# Patient Record
Sex: FEMALE | Race: BLACK OR AFRICAN AMERICAN | Employment: FULL TIME | URBAN - METROPOLITAN AREA
[De-identification: names, ages, dates, MRNs, and addresses within clinical notes are randomized per-mention and may not be internally consistent; named-entity substitution may affect disease eponyms.]

---

## 2017-05-02 DIAGNOSIS — B37.31 VAGINAL YEAST INFECTION: ICD-10-CM

## 2017-05-02 DIAGNOSIS — N76.0 BV (BACTERIAL VAGINOSIS): Primary | ICD-10-CM

## 2017-05-02 DIAGNOSIS — B96.89 BV (BACTERIAL VAGINOSIS): Primary | ICD-10-CM

## 2017-05-02 RX ORDER — FLUCONAZOLE 150 MG/1
150 TABLET ORAL ONCE
Qty: 1 TABLET | Refills: 1 | Status: SHIPPED | OUTPATIENT
Start: 2017-05-02 | End: 2017-05-02

## 2017-05-02 RX ORDER — METRONIDAZOLE 500 MG/1
500 TABLET ORAL 2 TIMES DAILY
Qty: 14 TABLET | Refills: 0 | Status: SHIPPED | OUTPATIENT
Start: 2017-05-02 | End: 2017-05-09

## 2017-05-13 ENCOUNTER — HOSPITAL ENCOUNTER (OUTPATIENT)
Dept: ULTRASOUND IMAGING | Age: 52
Discharge: HOME | End: 2017-05-13
Attending: OBSTETRICS & GYNECOLOGY | Admitting: OBSTETRICS & GYNECOLOGY

## 2017-05-13 DIAGNOSIS — R10.2 PELVIC PAIN IN FEMALE: ICD-10-CM

## 2017-06-16 ENCOUNTER — OFFICE VISIT (OUTPATIENT)
Dept: PRIMARY CARE CLINIC | Age: 52
End: 2017-06-16

## 2017-06-16 VITALS
DIASTOLIC BLOOD PRESSURE: 82 MMHG | HEIGHT: 62 IN | BODY MASS INDEX: 36.07 KG/M2 | TEMPERATURE: 97.8 F | WEIGHT: 196 LBS | SYSTOLIC BLOOD PRESSURE: 120 MMHG

## 2017-06-16 DIAGNOSIS — L50.8 URTICARIA, CHRONIC: Primary | ICD-10-CM

## 2017-06-16 PROCEDURE — 99202 OFFICE O/P NEW SF 15 MIN: CPT | Performed by: NURSE PRACTITIONER

## 2017-06-16 RX ORDER — CETIRIZINE HYDROCHLORIDE 1 MG/ML
10 SOLUTION ORAL DAILY
Qty: 1 BOTTLE | Refills: 3 | Status: SHIPPED | OUTPATIENT
Start: 2017-06-16 | End: 2017-08-21

## 2017-06-16 RX ORDER — HYDROXYZINE HYDROCHLORIDE 10 MG/1
10 TABLET, FILM COATED ORAL 3 TIMES DAILY PRN
Qty: 30 TABLET | Refills: 0 | Status: SHIPPED | OUTPATIENT
Start: 2017-06-16 | End: 2017-06-26

## 2017-06-16 ASSESSMENT — ENCOUNTER SYMPTOMS
VOMITING: 0
NAIL CHANGES: 0
SORE THROAT: 0
RHINORRHEA: 0
SHORTNESS OF BREATH: 0
COUGH: 0
DIARRHEA: 0
EYE PAIN: 0

## 2017-08-21 ENCOUNTER — OFFICE VISIT (OUTPATIENT)
Dept: FAMILY MEDICINE CLINIC | Age: 52
End: 2017-08-21

## 2017-08-21 VITALS
WEIGHT: 196 LBS | HEIGHT: 63 IN | DIASTOLIC BLOOD PRESSURE: 80 MMHG | SYSTOLIC BLOOD PRESSURE: 120 MMHG | BODY MASS INDEX: 34.73 KG/M2

## 2017-08-21 DIAGNOSIS — L50.9 HIVES: ICD-10-CM

## 2017-08-21 DIAGNOSIS — Z86.2 HISTORY OF ANEMIA: ICD-10-CM

## 2017-08-21 DIAGNOSIS — Z12.11 SCREEN FOR COLON CANCER: ICD-10-CM

## 2017-08-21 DIAGNOSIS — Z00.00 PREVENTATIVE HEALTH CARE: Primary | ICD-10-CM

## 2017-08-21 PROCEDURE — 99386 PREV VISIT NEW AGE 40-64: CPT | Performed by: PHYSICIAN ASSISTANT

## 2017-08-21 RX ORDER — HYDROXYZINE HYDROCHLORIDE 10 MG/1
20 TABLET, FILM COATED ORAL NIGHTLY PRN
COMMUNITY
End: 2018-09-28

## 2017-08-21 RX ORDER — LORATADINE 10 MG/1
10 TABLET ORAL DAILY
COMMUNITY
End: 2017-12-04 | Stop reason: ALTCHOICE

## 2017-08-21 RX ORDER — RANITIDINE 150 MG/1
150 TABLET ORAL 2 TIMES DAILY
COMMUNITY
End: 2017-12-04 | Stop reason: ALTCHOICE

## 2017-08-21 ASSESSMENT — ENCOUNTER SYMPTOMS
COUGH: 0
DIARRHEA: 0
CONSTIPATION: 0
TROUBLE SWALLOWING: 0
BACK PAIN: 0
CHEST TIGHTNESS: 0
VOICE CHANGE: 0
SHORTNESS OF BREATH: 0
ABDOMINAL PAIN: 0
SORE THROAT: 0
EYE PAIN: 0

## 2017-11-15 ENCOUNTER — HOSPITAL ENCOUNTER (OUTPATIENT)
Dept: MAMMOGRAPHY | Age: 52
Discharge: HOME | End: 2017-11-15
Attending: OBSTETRICS & GYNECOLOGY | Admitting: OBSTETRICS & GYNECOLOGY

## 2017-11-15 DIAGNOSIS — Z12.31 VISIT FOR SCREENING MAMMOGRAM: ICD-10-CM

## 2017-12-04 ENCOUNTER — OFFICE VISIT (OUTPATIENT)
Dept: FAMILY MEDICINE CLINIC | Age: 52
End: 2017-12-04

## 2017-12-04 VITALS
SYSTOLIC BLOOD PRESSURE: 130 MMHG | BODY MASS INDEX: 35.1 KG/M2 | WEIGHT: 195 LBS | OXYGEN SATURATION: 99 % | DIASTOLIC BLOOD PRESSURE: 90 MMHG | HEART RATE: 66 BPM

## 2017-12-04 DIAGNOSIS — M79.642 BILATERAL HAND PAIN: Primary | ICD-10-CM

## 2017-12-04 DIAGNOSIS — M79.641 BILATERAL HAND PAIN: Primary | ICD-10-CM

## 2017-12-04 PROCEDURE — 99213 OFFICE O/P EST LOW 20 MIN: CPT | Performed by: PHYSICIAN ASSISTANT

## 2017-12-04 RX ORDER — MELOXICAM 7.5 MG/1
7.5 TABLET ORAL DAILY
Qty: 30 TABLET | Refills: 3 | Status: SHIPPED | OUTPATIENT
Start: 2017-12-04 | End: 2018-01-26 | Stop reason: SDUPTHER

## 2017-12-04 ASSESSMENT — PATIENT HEALTH QUESTIONNAIRE - PHQ9
SUM OF ALL RESPONSES TO PHQ QUESTIONS 1-9: 0
2. FEELING DOWN, DEPRESSED OR HOPELESS: 0
SUM OF ALL RESPONSES TO PHQ9 QUESTIONS 1 & 2: 0
1. LITTLE INTEREST OR PLEASURE IN DOING THINGS: 0

## 2017-12-04 ASSESSMENT — ENCOUNTER SYMPTOMS: COLOR CHANGE: 0

## 2017-12-06 ENCOUNTER — TELEPHONE (OUTPATIENT)
Dept: INTERNAL MEDICINE CLINIC | Age: 52
End: 2017-12-06

## 2018-01-26 ENCOUNTER — OFFICE VISIT (OUTPATIENT)
Dept: RHEUMATOLOGY | Age: 53
End: 2018-01-26

## 2018-01-26 VITALS
WEIGHT: 198.8 LBS | DIASTOLIC BLOOD PRESSURE: 90 MMHG | HEIGHT: 63 IN | SYSTOLIC BLOOD PRESSURE: 140 MMHG | BODY MASS INDEX: 35.22 KG/M2

## 2018-01-26 DIAGNOSIS — M15.9 PRIMARY OSTEOARTHRITIS INVOLVING MULTIPLE JOINTS: Primary | ICD-10-CM

## 2018-01-26 DIAGNOSIS — R79.82 ELEVATED C-REACTIVE PROTEIN (CRP): Primary | ICD-10-CM

## 2018-01-26 DIAGNOSIS — M15.9 PRIMARY OSTEOARTHRITIS INVOLVING MULTIPLE JOINTS: ICD-10-CM

## 2018-01-26 DIAGNOSIS — M70.62 TROCHANTERIC BURSITIS OF LEFT HIP: ICD-10-CM

## 2018-01-26 LAB
A/G RATIO: 1.4 (ref 1.1–2.2)
ALBUMIN SERPL-MCNC: 4.3 G/DL (ref 3.4–5)
ALP BLD-CCNC: 125 U/L (ref 40–129)
ALT SERPL-CCNC: 16 U/L (ref 10–40)
ANION GAP SERPL CALCULATED.3IONS-SCNC: 14 MMOL/L (ref 3–16)
AST SERPL-CCNC: 20 U/L (ref 15–37)
BASOPHILS ABSOLUTE: 0 K/UL (ref 0–0.2)
BASOPHILS RELATIVE PERCENT: 0.5 %
BILIRUB SERPL-MCNC: <0.2 MG/DL (ref 0–1)
BUN BLDV-MCNC: 15 MG/DL (ref 7–20)
C-REACTIVE PROTEIN: 22.8 MG/L (ref 0–5.1)
CALCIUM SERPL-MCNC: 9.5 MG/DL (ref 8.3–10.6)
CHLORIDE BLD-SCNC: 103 MMOL/L (ref 99–110)
CO2: 25 MMOL/L (ref 21–32)
CREAT SERPL-MCNC: 0.6 MG/DL (ref 0.6–1.1)
EOSINOPHILS ABSOLUTE: 0.1 K/UL (ref 0–0.6)
EOSINOPHILS RELATIVE PERCENT: 1.9 %
GFR AFRICAN AMERICAN: >60
GFR NON-AFRICAN AMERICAN: >60
GLOBULIN: 3.1 G/DL
GLUCOSE BLD-MCNC: 99 MG/DL (ref 70–99)
HCT VFR BLD CALC: 35.1 % (ref 36–48)
HEMOGLOBIN: 11.5 G/DL (ref 12–16)
LYMPHOCYTES ABSOLUTE: 2.8 K/UL (ref 1–5.1)
LYMPHOCYTES RELATIVE PERCENT: 53.3 %
MCH RBC QN AUTO: 29.1 PG (ref 26–34)
MCHC RBC AUTO-ENTMCNC: 32.8 G/DL (ref 31–36)
MCV RBC AUTO: 88.5 FL (ref 80–100)
MONOCYTES ABSOLUTE: 0.4 K/UL (ref 0–1.3)
MONOCYTES RELATIVE PERCENT: 7.5 %
NEUTROPHILS ABSOLUTE: 1.9 K/UL (ref 1.7–7.7)
NEUTROPHILS RELATIVE PERCENT: 36.8 %
PDW BLD-RTO: 15.3 % (ref 12.4–15.4)
PLATELET # BLD: 244 K/UL (ref 135–450)
PMV BLD AUTO: 8.7 FL (ref 5–10.5)
POTASSIUM SERPL-SCNC: 4.1 MMOL/L (ref 3.5–5.1)
RBC # BLD: 3.96 M/UL (ref 4–5.2)
SEDIMENTATION RATE, ERYTHROCYTE: 38 MM/HR (ref 0–30)
SODIUM BLD-SCNC: 142 MMOL/L (ref 136–145)
TOTAL PROTEIN: 7.4 G/DL (ref 6.4–8.2)
WBC # BLD: 5.2 K/UL (ref 4–11)

## 2018-01-26 PROCEDURE — 99244 OFF/OP CNSLTJ NEW/EST MOD 40: CPT | Performed by: INTERNAL MEDICINE

## 2018-01-26 PROCEDURE — 20610 DRAIN/INJ JOINT/BURSA W/O US: CPT | Performed by: INTERNAL MEDICINE

## 2018-01-26 RX ORDER — LIDOCAINE HYDROCHLORIDE 10 MG/ML
3 INJECTION, SOLUTION INFILTRATION; PERINEURAL ONCE
Status: COMPLETED | OUTPATIENT
Start: 2018-01-26 | End: 2018-01-26

## 2018-01-26 RX ORDER — MELOXICAM 15 MG/1
15 TABLET ORAL DAILY
Qty: 30 TABLET | Refills: 5 | Status: SHIPPED | OUTPATIENT
Start: 2018-01-26 | End: 2018-06-25 | Stop reason: SDUPTHER

## 2018-01-26 RX ORDER — TRIAMCINOLONE ACETONIDE 40 MG/ML
40 INJECTION, SUSPENSION INTRA-ARTICULAR; INTRAMUSCULAR ONCE
Status: COMPLETED | OUTPATIENT
Start: 2018-01-26 | End: 2018-01-26

## 2018-01-26 RX ORDER — RANITIDINE 150 MG/1
150 TABLET ORAL DAILY
COMMUNITY
End: 2018-09-28

## 2018-01-26 RX ADMIN — TRIAMCINOLONE ACETONIDE 40 MG: 40 INJECTION, SUSPENSION INTRA-ARTICULAR; INTRAMUSCULAR at 13:14

## 2018-01-26 RX ADMIN — LIDOCAINE HYDROCHLORIDE 3 ML: 10 INJECTION, SOLUTION INFILTRATION; PERINEURAL at 13:12

## 2018-01-26 NOTE — PATIENT INSTRUCTIONS
Increase meloxicam to 15 mg daily   Tylenol 650 mg every 6 hours   Physical and occupational therapy   Paraffin wax bath   Weight loss

## 2018-01-26 NOTE — PROGRESS NOTES
malaise. Integumentary: No photosensitivity, malar rash, livedo reticularis, alopecia and Raynaud's symptoms, sclerodactyly, skin tightening  Eyes: negative for dry eyes, visual disturbance and persistent redness, discharge from eyes   ENT: - No tinnitus, loss of hearing, vertigo, or recurrent ear infections.  - No history of nasal/oral ulcers. - No history of sicca symptoms. Cardiovascular: No history of pericarditis, chest pain or murmur or palpitations  Respiratory: No shortness of breath, cough or history of interstitial lung disease. No history of pleurisy. No history of tuberculosis or atypical infections. Gastrointestinal: No history of dysphagia or esophageal dysmotility. No change in bowel habits or any inflammatory bowel disease. Genitourinary: No history renal disease, miscarriages. Hematologic/Lymphatic: No  bleeding, blood clots or swollen lymph nodes. Neurological: No history seizure or focal weakness. No history of neuropathies, paresthesias or hyperesthesias, facial droop, diplopia  Psychiatric: No history of bipolar disease, anxiety, depression  Endocrine: Denies any thyroid / parathyroid disease and osteoporosis  Allergic/Immunologic: No nasal congestion         I have reviewed patients Past medical History, Social History and Family History as mentioned in her chart and this remains unchanged from previous. Past Medical History:   Diagnosis Date    Anemia     Fibroids      Past Surgical History:   Procedure Laterality Date    HYSTERECTOMY  10/2013    abbie PROCTOR     Social History     Social History    Marital status: Single     Spouse name: N/A    Number of children: N/A    Years of education: N/A     Occupational History    Not on file.      Social History Main Topics    Smoking status: Never Smoker    Smokeless tobacco: Never Used    Alcohol use Yes      Comment: RARELY    Drug use: No    Sexual activity: Yes     Partners: Male     Other Topics Concern    Not on file

## 2018-03-16 DIAGNOSIS — R79.82 ELEVATED C-REACTIVE PROTEIN (CRP): ICD-10-CM

## 2018-03-17 LAB
HEPATITIS B CORE IGM ANTIBODY: NORMAL
HEPATITIS B SURFACE ANTIGEN INTERPRETATION: NORMAL
HEPATITIS C ANTIBODY INTERPRETATION: NORMAL
RHEUMATOID FACTOR: <10 IU/ML

## 2018-03-18 LAB
CCP IGG ANTIBODIES: 26 UNITS (ref 0–19)
ENA TO SSB (LA) ANTIBODY: 0 AU/ML (ref 0–40)
SSA 52 (RO) (ENA) AB, IGG: 4 AU/ML (ref 0–40)
SSA 60 (RO) (ENA) AB, IGG: 4 AU/ML (ref 0–40)

## 2018-03-19 ENCOUNTER — OFFICE VISIT (OUTPATIENT)
Dept: RHEUMATOLOGY | Age: 53
End: 2018-03-19

## 2018-03-19 VITALS
HEIGHT: 62 IN | BODY MASS INDEX: 35.81 KG/M2 | SYSTOLIC BLOOD PRESSURE: 120 MMHG | OXYGEN SATURATION: 98 % | HEART RATE: 81 BPM | WEIGHT: 194.6 LBS | DIASTOLIC BLOOD PRESSURE: 80 MMHG

## 2018-03-19 DIAGNOSIS — M15.9 PRIMARY OSTEOARTHRITIS INVOLVING MULTIPLE JOINTS: Primary | ICD-10-CM

## 2018-03-19 DIAGNOSIS — M06.09 RHEUMATOID ARTHRITIS OF MULTIPLE SITES WITH NEGATIVE RHEUMATOID FACTOR (HCC): ICD-10-CM

## 2018-03-19 LAB
ALBUMIN SERPL-MCNC: 3.3 G/DL (ref 3.1–4.9)
ALPHA-1-GLOBULIN: 0.3 G/DL (ref 0.2–0.4)
ALPHA-2-GLOBULIN: 0.6 G/DL (ref 0.4–1.1)
ANA BY IFA: NORMAL
BETA GLOBULIN: 1.6 G/DL (ref 0.9–1.6)
GAMMA GLOBULIN: 1.3 G/DL (ref 0.6–1.8)
SPE/IFE INTERPRETATION: NORMAL
TOTAL PROTEIN: 7.1 G/DL (ref 6.4–8.2)

## 2018-03-19 PROCEDURE — 99214 OFFICE O/P EST MOD 30 MIN: CPT | Performed by: INTERNAL MEDICINE

## 2018-03-19 RX ORDER — FOLIC ACID 1 MG/1
1 TABLET ORAL DAILY
Qty: 30 TABLET | Refills: 5 | Status: SHIPPED | OUTPATIENT
Start: 2018-03-19 | End: 2019-01-25

## 2018-03-19 NOTE — PROGRESS NOTES
FULL    MTP5  0  0  FULL   0  0  FULL    IP1  0  0  FULL   0  0  FULL    IP2  0  0  FULL   0  0  FULL    IP3  0  0  FULL   0  0  FULL    IP4  0  0  FULL   0  0  FULL    IP5  0  0  FULL   0 0 FULL     Ambulates without assistance, normal gait  Neck: Full ROM, no tenderness, supple   Back- no tenderness. Eyes:  PERRL, extra ocular movements intact, conjunctiva normal   HEENT:  Atraumatic, normocephalic, external ears normal, oropharynx moist, no pharyngeal exudates. Respiratory:  No respiratory distress  GI:  Soft, nondistended, normal bowel sounds, nontender, no organomegaly, no mass, no rebound, no guarding   :  No costovertebral angle tenderness   Integument:  Well hydrated, no rash or telangiectasias  Lymphatic:  No lymphadenopathy noted   Neurologic:   Alert & oriented x 3, CN 2-12 normal, no focal deficits noted. Sensations Intact. Muscle strength 5/5 proximally and distally in upper and lower extremities.    Psychiatric:  Speech and behavior appropriate           LABS AND IMAGING  Outside data reviewed and in HPI    Lab Results   Component Value Date    WBC 5.2 01/26/2018    RBC 3.96 01/26/2018    HGB 11.5 01/26/2018    HCT 35.1 01/26/2018     01/26/2018    MCV 88.5 01/26/2018    MCH 29.1 01/26/2018    MCHC 32.8 01/26/2018    RDW 15.3 01/26/2018    SEGSPCT 34 09/02/2017    LYMPHOPCT 53.3 01/26/2018    MONOPCT 7.5 01/26/2018    BASOPCT 0.5 01/26/2018    MONOSABS 0.4 01/26/2018    LYMPHSABS 2.8 01/26/2018    EOSABS 0.1 01/26/2018    BASOSABS 0.0 01/26/2018       Chemistry        Component Value Date/Time     01/26/2018 1004    K 4.1 01/26/2018 1004     01/26/2018 1004    CO2 25 01/26/2018 1004    BUN 15 01/26/2018 1004    CREATININE 0.6 01/26/2018 1004        Component Value Date/Time    CALCIUM 9.5 01/26/2018 1004    ALKPHOS 125 01/26/2018 1004    AST 20 01/26/2018 1004    ALT 16 01/26/2018 1004    BILITOT <0.2 01/26/2018 1004          Lab Results   Component Value Date    SEDRATE 38 (H) with the patient. All questions were answered. ######################################################################    I thank you for giving me the opportunity to participate in [facility]. If you have any questions or concerns please feel free to contact me. I look forward to following  Kiara Jameson along with you. Electronically signed by: Alley Vizcarra MD, 3/19/2018 3:23 PM    Documentation was done using voice recognition dragon software. Every effort was made to ensure accuracy; however, inadvertent unintentional computerized transcription errors may be present.

## 2018-04-09 ENCOUNTER — TELEPHONE (OUTPATIENT)
Dept: RHEUMATOLOGY | Age: 53
End: 2018-04-09

## 2018-04-17 ENCOUNTER — OFFICE VISIT (OUTPATIENT)
Dept: GYNECOLOGY | Age: 53
End: 2018-04-17

## 2018-04-17 VITALS
SYSTOLIC BLOOD PRESSURE: 121 MMHG | BODY MASS INDEX: 36.4 KG/M2 | WEIGHT: 199 LBS | DIASTOLIC BLOOD PRESSURE: 67 MMHG | HEART RATE: 72 BPM

## 2018-04-17 DIAGNOSIS — N89.8 VAGINAL DISCHARGE: Primary | ICD-10-CM

## 2018-04-17 LAB
BACTERIA WET PREP: NORMAL
CLUE CELLS: NORMAL
EPITHELIAL CELLS WET PREP: NORMAL
RBC WET PREP: NORMAL
SOURCE WET PREP: NORMAL
TRICHOMONAS PREP: NORMAL
WBC WET PREP: NORMAL
YEAST WET PREP: NORMAL

## 2018-04-17 PROCEDURE — 99213 OFFICE O/P EST LOW 20 MIN: CPT | Performed by: NURSE PRACTITIONER

## 2018-04-19 ENCOUNTER — PATIENT MESSAGE (OUTPATIENT)
Dept: GYNECOLOGY | Age: 53
End: 2018-04-19

## 2018-04-19 LAB — GENITAL CULTURE, ROUTINE: NORMAL

## 2018-05-14 ENCOUNTER — TELEPHONE (OUTPATIENT)
Dept: GYNECOLOGY | Age: 53
End: 2018-05-14

## 2018-05-14 ENCOUNTER — OFFICE VISIT (OUTPATIENT)
Dept: RHEUMATOLOGY | Age: 53
End: 2018-05-14

## 2018-05-14 VITALS
SYSTOLIC BLOOD PRESSURE: 110 MMHG | DIASTOLIC BLOOD PRESSURE: 88 MMHG | OXYGEN SATURATION: 96 % | BODY MASS INDEX: 36.44 KG/M2 | WEIGHT: 198 LBS | HEIGHT: 62 IN | HEART RATE: 66 BPM

## 2018-05-14 DIAGNOSIS — M15.9 PRIMARY OSTEOARTHRITIS INVOLVING MULTIPLE JOINTS: ICD-10-CM

## 2018-05-14 DIAGNOSIS — M06.09 RHEUMATOID ARTHRITIS OF MULTIPLE SITES WITH NEGATIVE RHEUMATOID FACTOR (HCC): Primary | ICD-10-CM

## 2018-05-14 DIAGNOSIS — M06.09 RHEUMATOID ARTHRITIS OF MULTIPLE SITES WITH NEGATIVE RHEUMATOID FACTOR (HCC): ICD-10-CM

## 2018-05-14 DIAGNOSIS — R35.0 URINE FREQUENCY: Primary | ICD-10-CM

## 2018-05-14 LAB
A/G RATIO: 1.4 (ref 1.1–2.2)
ALBUMIN SERPL-MCNC: 4.1 G/DL (ref 3.4–5)
ALP BLD-CCNC: 115 U/L (ref 40–129)
ALT SERPL-CCNC: 11 U/L (ref 10–40)
ANION GAP SERPL CALCULATED.3IONS-SCNC: 14 MMOL/L (ref 3–16)
AST SERPL-CCNC: 15 U/L (ref 15–37)
BASOPHILS ABSOLUTE: 0 K/UL (ref 0–0.2)
BASOPHILS RELATIVE PERCENT: 0.4 %
BILIRUB SERPL-MCNC: <0.2 MG/DL (ref 0–1)
BUN BLDV-MCNC: 11 MG/DL (ref 7–20)
C-REACTIVE PROTEIN: 14.7 MG/L (ref 0–5.1)
CALCIUM SERPL-MCNC: 9.3 MG/DL (ref 8.3–10.6)
CHLORIDE BLD-SCNC: 103 MMOL/L (ref 99–110)
CO2: 26 MMOL/L (ref 21–32)
CREAT SERPL-MCNC: 0.6 MG/DL (ref 0.6–1.1)
EOSINOPHILS ABSOLUTE: 0.1 K/UL (ref 0–0.6)
EOSINOPHILS RELATIVE PERCENT: 1.6 %
GFR AFRICAN AMERICAN: >60
GFR NON-AFRICAN AMERICAN: >60
GLOBULIN: 2.9 G/DL
GLUCOSE BLD-MCNC: 89 MG/DL (ref 70–99)
HCT VFR BLD CALC: 34.2 % (ref 36–48)
HEMOGLOBIN: 11.3 G/DL (ref 12–16)
LYMPHOCYTES ABSOLUTE: 3.1 K/UL (ref 1–5.1)
LYMPHOCYTES RELATIVE PERCENT: 50 %
MCH RBC QN AUTO: 28.8 PG (ref 26–34)
MCHC RBC AUTO-ENTMCNC: 33.2 G/DL (ref 31–36)
MCV RBC AUTO: 86.9 FL (ref 80–100)
MONOCYTES ABSOLUTE: 0.3 K/UL (ref 0–1.3)
MONOCYTES RELATIVE PERCENT: 5.5 %
NEUTROPHILS ABSOLUTE: 2.6 K/UL (ref 1.7–7.7)
NEUTROPHILS RELATIVE PERCENT: 42.5 %
PDW BLD-RTO: 14.7 % (ref 12.4–15.4)
PLATELET # BLD: 256 K/UL (ref 135–450)
PMV BLD AUTO: 9 FL (ref 5–10.5)
POTASSIUM SERPL-SCNC: 3.8 MMOL/L (ref 3.5–5.1)
RBC # BLD: 3.93 M/UL (ref 4–5.2)
SEDIMENTATION RATE, ERYTHROCYTE: 41 MM/HR (ref 0–30)
SODIUM BLD-SCNC: 143 MMOL/L (ref 136–145)
TOTAL PROTEIN: 7 G/DL (ref 6.4–8.2)
WBC # BLD: 6.2 K/UL (ref 4–11)

## 2018-05-14 PROCEDURE — 99214 OFFICE O/P EST MOD 30 MIN: CPT | Performed by: INTERNAL MEDICINE

## 2018-05-14 RX ORDER — LEFLUNOMIDE 20 MG/1
20 TABLET ORAL DAILY
Qty: 30 TABLET | Refills: 0 | Status: SHIPPED | OUTPATIENT
Start: 2018-05-14 | End: 2018-06-25 | Stop reason: SDUPTHER

## 2018-05-15 RX ORDER — CIPROFLOXACIN 500 MG/1
500 TABLET, FILM COATED ORAL 2 TIMES DAILY
Qty: 14 TABLET | Refills: 0 | OUTPATIENT
Start: 2018-05-15 | End: 2018-05-22

## 2018-05-15 RX ORDER — PHENAZOPYRIDINE HYDROCHLORIDE 200 MG/1
200 TABLET, FILM COATED ORAL 3 TIMES DAILY PRN
Qty: 15 TABLET | Refills: 0 | OUTPATIENT
Start: 2018-05-15 | End: 2018-05-18

## 2018-05-17 LAB
ORGANISM: ABNORMAL
URINE CULTURE, ROUTINE: ABNORMAL
URINE CULTURE, ROUTINE: ABNORMAL

## 2018-06-25 ENCOUNTER — OFFICE VISIT (OUTPATIENT)
Dept: RHEUMATOLOGY | Age: 53
End: 2018-06-25

## 2018-06-25 VITALS
HEIGHT: 62 IN | SYSTOLIC BLOOD PRESSURE: 120 MMHG | DIASTOLIC BLOOD PRESSURE: 88 MMHG | BODY MASS INDEX: 35.37 KG/M2 | OXYGEN SATURATION: 96 % | WEIGHT: 192.2 LBS | HEART RATE: 73 BPM

## 2018-06-25 DIAGNOSIS — M15.9 PRIMARY OSTEOARTHRITIS INVOLVING MULTIPLE JOINTS: ICD-10-CM

## 2018-06-25 DIAGNOSIS — M06.09 RHEUMATOID ARTHRITIS OF MULTIPLE SITES WITH NEGATIVE RHEUMATOID FACTOR (HCC): ICD-10-CM

## 2018-06-25 DIAGNOSIS — M06.09 RHEUMATOID ARTHRITIS OF MULTIPLE SITES WITH NEGATIVE RHEUMATOID FACTOR (HCC): Primary | ICD-10-CM

## 2018-06-25 DIAGNOSIS — Z51.11 MAINTENANCE CHEMOTHERAPY: ICD-10-CM

## 2018-06-25 LAB
ALT SERPL-CCNC: 27 U/L (ref 10–40)
AST SERPL-CCNC: 26 U/L (ref 15–37)
BASOPHILS ABSOLUTE: 0 K/UL (ref 0–0.2)
BASOPHILS RELATIVE PERCENT: 0.3 %
CREAT SERPL-MCNC: 0.5 MG/DL (ref 0.6–1.1)
EOSINOPHILS ABSOLUTE: 0.1 K/UL (ref 0–0.6)
EOSINOPHILS RELATIVE PERCENT: 1.7 %
GFR AFRICAN AMERICAN: >60
GFR NON-AFRICAN AMERICAN: >60
HCT VFR BLD CALC: 36.3 % (ref 36–48)
HEMOGLOBIN: 12 G/DL (ref 12–16)
LYMPHOCYTES ABSOLUTE: 2.7 K/UL (ref 1–5.1)
LYMPHOCYTES RELATIVE PERCENT: 55.8 %
MCH RBC QN AUTO: 29.1 PG (ref 26–34)
MCHC RBC AUTO-ENTMCNC: 33.1 G/DL (ref 31–36)
MCV RBC AUTO: 87.8 FL (ref 80–100)
MONOCYTES ABSOLUTE: 0.4 K/UL (ref 0–1.3)
MONOCYTES RELATIVE PERCENT: 8.5 %
NEUTROPHILS ABSOLUTE: 1.6 K/UL (ref 1.7–7.7)
NEUTROPHILS RELATIVE PERCENT: 33.7 %
PDW BLD-RTO: 15.4 % (ref 12.4–15.4)
PLATELET # BLD: 244 K/UL (ref 135–450)
PMV BLD AUTO: 9.1 FL (ref 5–10.5)
RBC # BLD: 4.13 M/UL (ref 4–5.2)
WBC # BLD: 4.8 K/UL (ref 4–11)

## 2018-06-25 PROCEDURE — 99214 OFFICE O/P EST MOD 30 MIN: CPT | Performed by: INTERNAL MEDICINE

## 2018-06-25 PROCEDURE — 20610 DRAIN/INJ JOINT/BURSA W/O US: CPT | Performed by: INTERNAL MEDICINE

## 2018-06-25 RX ORDER — MELOXICAM 15 MG/1
15 TABLET ORAL DAILY
Qty: 30 TABLET | Refills: 5 | Status: SHIPPED | OUTPATIENT
Start: 2018-06-25 | End: 2019-03-29 | Stop reason: SDUPTHER

## 2018-06-25 RX ORDER — LEFLUNOMIDE 20 MG/1
20 TABLET ORAL DAILY
Qty: 30 TABLET | Refills: 2 | Status: SHIPPED | OUTPATIENT
Start: 2018-06-25 | End: 2018-09-07 | Stop reason: SDUPTHER

## 2018-06-25 RX ORDER — LIDOCAINE HYDROCHLORIDE 20 MG/ML
2 INJECTION, SOLUTION INFILTRATION; PERINEURAL ONCE
Status: COMPLETED | OUTPATIENT
Start: 2018-06-25 | End: 2018-06-25

## 2018-06-25 RX ORDER — TRIAMCINOLONE ACETONIDE 40 MG/ML
60 INJECTION, SUSPENSION INTRA-ARTICULAR; INTRAMUSCULAR ONCE
Status: COMPLETED | OUTPATIENT
Start: 2018-06-25 | End: 2018-06-25

## 2018-06-25 RX ADMIN — LIDOCAINE HYDROCHLORIDE 2 ML: 20 INJECTION, SOLUTION INFILTRATION; PERINEURAL at 13:10

## 2018-06-25 RX ADMIN — TRIAMCINOLONE ACETONIDE 60 MG: 40 INJECTION, SUSPENSION INTRA-ARTICULAR; INTRAMUSCULAR at 13:12

## 2018-06-28 LAB
QUANTIFERON (R) TB GOLD (INCUBATED): NEGATIVE
QUANTIFERON MITOGEN: >10 IU/ML
QUANTIFERON NIL: 0.04 IU/ML
QUANTIFERON TB AG MINUS NIL: 0.06 IU/ML (ref 0–0.34)

## 2018-09-07 DIAGNOSIS — M06.09 RHEUMATOID ARTHRITIS OF MULTIPLE SITES WITH NEGATIVE RHEUMATOID FACTOR (HCC): ICD-10-CM

## 2018-09-07 RX ORDER — LEFLUNOMIDE 20 MG/1
20 TABLET ORAL DAILY
Qty: 30 TABLET | Refills: 2 | Status: SHIPPED | OUTPATIENT
Start: 2018-09-07 | End: 2018-12-28

## 2018-09-28 ENCOUNTER — OFFICE VISIT (OUTPATIENT)
Dept: RHEUMATOLOGY | Age: 53
End: 2018-09-28
Payer: COMMERCIAL

## 2018-09-28 VITALS
BODY MASS INDEX: 34.85 KG/M2 | HEART RATE: 84 BPM | WEIGHT: 189.4 LBS | DIASTOLIC BLOOD PRESSURE: 84 MMHG | HEIGHT: 62 IN | SYSTOLIC BLOOD PRESSURE: 130 MMHG

## 2018-09-28 DIAGNOSIS — Z51.11 MAINTENANCE CHEMOTHERAPY: ICD-10-CM

## 2018-09-28 DIAGNOSIS — M15.9 PRIMARY OSTEOARTHRITIS INVOLVING MULTIPLE JOINTS: ICD-10-CM

## 2018-09-28 DIAGNOSIS — M06.09 RHEUMATOID ARTHRITIS OF MULTIPLE SITES WITH NEGATIVE RHEUMATOID FACTOR (HCC): Primary | ICD-10-CM

## 2018-09-28 DIAGNOSIS — M06.09 RHEUMATOID ARTHRITIS OF MULTIPLE SITES WITH NEGATIVE RHEUMATOID FACTOR (HCC): ICD-10-CM

## 2018-09-28 LAB
ALT SERPL-CCNC: 13 U/L (ref 10–40)
AST SERPL-CCNC: 17 U/L (ref 15–37)
BASOPHILS ABSOLUTE: 0.1 K/UL (ref 0–0.2)
BASOPHILS RELATIVE PERCENT: 1 %
C-REACTIVE PROTEIN: 19.5 MG/L (ref 0–5.1)
CREAT SERPL-MCNC: 0.6 MG/DL (ref 0.6–1.1)
EOSINOPHILS ABSOLUTE: 0.1 K/UL (ref 0–0.6)
EOSINOPHILS RELATIVE PERCENT: 2.6 %
GFR AFRICAN AMERICAN: >60
GFR NON-AFRICAN AMERICAN: >60
HCT VFR BLD CALC: 38 % (ref 36–48)
HEMOGLOBIN: 12.3 G/DL (ref 12–16)
LYMPHOCYTES ABSOLUTE: 2.7 K/UL (ref 1–5.1)
LYMPHOCYTES RELATIVE PERCENT: 52.3 %
MCH RBC QN AUTO: 28.7 PG (ref 26–34)
MCHC RBC AUTO-ENTMCNC: 32.2 G/DL (ref 31–36)
MCV RBC AUTO: 89 FL (ref 80–100)
MONOCYTES ABSOLUTE: 0.3 K/UL (ref 0–1.3)
MONOCYTES RELATIVE PERCENT: 6 %
NEUTROPHILS ABSOLUTE: 2 K/UL (ref 1.7–7.7)
NEUTROPHILS RELATIVE PERCENT: 38.1 %
PDW BLD-RTO: 15.1 % (ref 12.4–15.4)
PLATELET # BLD: 259 K/UL (ref 135–450)
PMV BLD AUTO: 9.4 FL (ref 5–10.5)
RBC # BLD: 4.27 M/UL (ref 4–5.2)
SEDIMENTATION RATE, ERYTHROCYTE: 34 MM/HR (ref 0–30)
WBC # BLD: 5.2 K/UL (ref 4–11)

## 2018-09-28 PROCEDURE — 99214 OFFICE O/P EST MOD 30 MIN: CPT | Performed by: INTERNAL MEDICINE

## 2018-09-28 NOTE — PROGRESS NOTES
2018  Patient Name: Kwasi Sebastian  : 1965  Medical Record: [id number]    MEDICATIONS  Current Outpatient Prescriptions   Medication Sig Dispense Refill    leflunomide (ARAVA) 20 MG tablet Take 1 tablet by mouth daily 30 tablet 2    meloxicam (MOBIC) 15 MG tablet Take 1 tablet by mouth daily 30 tablet 5    folic acid (FOLVITE) 1 MG tablet Take 1 tablet by mouth daily 30 tablet 5     No current facility-administered medications for this visit. ALLERGIES  No Known Allergies      Comments  No specialty comments available. Background history:  Kwasi Sebastian is a 47 y.o. female who is being seen for follow up evaluation of Joint pain. She is complaining of pain in hands, left hip and left knee. Pain in the hands started 2 years ago and is progressively getting worse. She describes her pain as constant, aching, 5-6 out of 10 without any significant relieving or aggravating factors. Pain is located in PIP, MCP, DIP joints. Pain in the left hip is located on the lateral side and gets worse on laying on the left side. Knees crack and pop. Knee pain gets worse going up and down the steps. She has swelling in the knuckles, stiffness throughout the day. She also has difficulty opening doorknobs and jar cans. She is on meloxicam 7.5 mg daily without improvement. She denies any history of psoriasis, inflammatory bowel disease, inflammatory back pain, uveitis, enthesitis, tenosynovitis or dactylitis. Interim history: She presents for follow-up of Osteoarthritis and rheumatoid arthritis. She has mild achiness in the joints with weather changes. She denies any swelling or stiffness. She had corticosteroid injection in the left knee 3 months ago which has helped significantly. She occasionally feels pain in the left knee at night. She is on meloxicam 15 mg daily and Arava 20 mg daily. X-rays were ordered but not obtained.   Blood work shows low titer positive CCP, elevated ESR and Results   Component Value Date    SEDRATE 41 (H) 05/14/2018     Lab Results   Component Value Date    CRP 14.7 (H) 05/14/2018     No results found for: JADEN, SHANIQUE, SSA, SSB, C3, C4  Lab Results   Component Value Date    RF <10.0 03/16/2018    CCPABIGG 26 03/16/2018     No results found for: JADEN, ANATITER, ANAINT, PATH  No results found for: [facility], DSDNAIGGIFA  Lab Results   Component Value Date    SSALAAB 0 03/16/2018     No results found for: SMAB, RNPAB  No results found for: CENTABIGG  No results found for: C3, C4, ACE  No results found for: JO1, VITD25, [id number]    ASSESSMENT AND PLAN      Assessment/Plan:      ASSESSMENT:    1. Rheumatoid arthritis of multiple sites with negative rheumatoid factor ([facility] [address]    2. Maintenance chemotherapy    3. Primary osteoarthritis involving multiple joints        PLAN:   1. Rheumatoid arthritis of multiple sites with negative rheumatoid factor (HCC)  Low titer positive CCP, elevated ESR and CRP and negative RF  -No active synovitis on joint exam  -Continue leflunomide 20 mg daily  - C-Reactive Protein; Future  - Sedimentation Rate; Future  -Pass medications methotrexate [nausea and gastric upset]    2. Maintenance chemotherapy  Labs every 12 weeks  - ALT  - AST  - CBC Auto Differential  - Creatinine, Serum    3. Primary osteoarthritis involving multiple joints  Stable. Continue meloxicam 15 mg daily       The patient indicates understanding of these issues and agrees with the plan. Return in about 3 months (around 12/28/2018). The risks and benefits of my recommendations, as well as other treatment options, benefits and side effects were discussed with the patient. All questions were answered. ######################################################################    I thank you for giving me the opportunity to participate in [facility]. If you have any questions or concerns please feel free to contact me.  I look forward to following  Rachel along with you. Electronically signed by: Gordon Piña MD, 9/28/2018 10:28 AM    Documentation was done using voice recognition dragon software. Every effort was made to ensure accuracy; however, inadvertent unintentional computerized transcription errors may be present.

## 2018-12-28 ENCOUNTER — OFFICE VISIT (OUTPATIENT)
Dept: RHEUMATOLOGY | Age: 53
End: 2018-12-28
Payer: COMMERCIAL

## 2018-12-28 VITALS
TEMPERATURE: 98.1 F | HEIGHT: 62 IN | BODY MASS INDEX: 35.33 KG/M2 | SYSTOLIC BLOOD PRESSURE: 135 MMHG | WEIGHT: 192 LBS | HEART RATE: 76 BPM | DIASTOLIC BLOOD PRESSURE: 89 MMHG

## 2018-12-28 DIAGNOSIS — M15.9 PRIMARY OSTEOARTHRITIS INVOLVING MULTIPLE JOINTS: ICD-10-CM

## 2018-12-28 DIAGNOSIS — Z51.11 MAINTENANCE CHEMOTHERAPY: ICD-10-CM

## 2018-12-28 DIAGNOSIS — M06.09 RHEUMATOID ARTHRITIS OF MULTIPLE SITES WITH NEGATIVE RHEUMATOID FACTOR (HCC): Primary | ICD-10-CM

## 2018-12-28 PROCEDURE — 99214 OFFICE O/P EST MOD 30 MIN: CPT | Performed by: INTERNAL MEDICINE

## 2018-12-28 RX ORDER — SULFASALAZINE 500 MG/1
TABLET ORAL
Qty: 120 TABLET | Refills: 1 | Status: SHIPPED | OUTPATIENT
Start: 2018-12-28 | End: 2019-01-25

## 2019-01-15 ENCOUNTER — TELEPHONE (OUTPATIENT)
Dept: GYNECOLOGY | Age: 54
End: 2019-01-15

## 2019-01-15 DIAGNOSIS — N89.8 VAGINAL DISCHARGE: Primary | ICD-10-CM

## 2019-01-15 DIAGNOSIS — R30.0 DYSURIA: Primary | ICD-10-CM

## 2019-01-15 RX ORDER — FLUCONAZOLE 150 MG/1
150 TABLET ORAL ONCE
Qty: 1 TABLET | Refills: 1 | Status: SHIPPED | OUTPATIENT
Start: 2019-01-15 | End: 2019-01-15

## 2019-01-15 RX ORDER — METRONIDAZOLE 500 MG/1
500 TABLET ORAL 2 TIMES DAILY
Qty: 14 TABLET | Refills: 0 | Status: SHIPPED | OUTPATIENT
Start: 2019-01-15 | End: 2019-01-22

## 2019-01-25 ENCOUNTER — OFFICE VISIT (OUTPATIENT)
Dept: RHEUMATOLOGY | Age: 54
End: 2019-01-25
Payer: COMMERCIAL

## 2019-01-25 VITALS
WEIGHT: 198 LBS | DIASTOLIC BLOOD PRESSURE: 84 MMHG | HEART RATE: 69 BPM | HEIGHT: 62 IN | SYSTOLIC BLOOD PRESSURE: 141 MMHG | BODY MASS INDEX: 36.44 KG/M2

## 2019-01-25 DIAGNOSIS — M06.09 RHEUMATOID ARTHRITIS OF MULTIPLE SITES WITH NEGATIVE RHEUMATOID FACTOR (HCC): Primary | ICD-10-CM

## 2019-01-25 DIAGNOSIS — M15.9 PRIMARY OSTEOARTHRITIS INVOLVING MULTIPLE JOINTS: ICD-10-CM

## 2019-01-25 DIAGNOSIS — Z51.11 MAINTENANCE CHEMOTHERAPY: ICD-10-CM

## 2019-01-25 LAB
ALT SERPL-CCNC: 17 U/L (ref 10–40)
AST SERPL-CCNC: 22 U/L (ref 15–37)
BASOPHILS ABSOLUTE: 0 K/UL (ref 0–0.2)
BASOPHILS RELATIVE PERCENT: 0.9 %
CREAT SERPL-MCNC: <0.5 MG/DL (ref 0.6–1.1)
EOSINOPHILS ABSOLUTE: 0.2 K/UL (ref 0–0.6)
EOSINOPHILS RELATIVE PERCENT: 4.2 %
GFR AFRICAN AMERICAN: >60
GFR NON-AFRICAN AMERICAN: >60
HCT VFR BLD CALC: 34.7 % (ref 36–48)
HEMOGLOBIN: 11.4 G/DL (ref 12–16)
LYMPHOCYTES ABSOLUTE: 2.6 K/UL (ref 1–5.1)
LYMPHOCYTES RELATIVE PERCENT: 54 %
MCH RBC QN AUTO: 28.8 PG (ref 26–34)
MCHC RBC AUTO-ENTMCNC: 32.9 G/DL (ref 31–36)
MCV RBC AUTO: 87.6 FL (ref 80–100)
MONOCYTES ABSOLUTE: 0.3 K/UL (ref 0–1.3)
MONOCYTES RELATIVE PERCENT: 6 %
NEUTROPHILS ABSOLUTE: 1.7 K/UL (ref 1.7–7.7)
NEUTROPHILS RELATIVE PERCENT: 34.9 %
PDW BLD-RTO: 14.9 % (ref 12.4–15.4)
PLATELET # BLD: 277 K/UL (ref 135–450)
PMV BLD AUTO: 9.1 FL (ref 5–10.5)
RBC # BLD: 3.97 M/UL (ref 4–5.2)
WBC # BLD: 4.7 K/UL (ref 4–11)

## 2019-01-25 PROCEDURE — 99214 OFFICE O/P EST MOD 30 MIN: CPT | Performed by: INTERNAL MEDICINE

## 2019-01-25 RX ORDER — FOLIC ACID 1 MG/1
1 TABLET ORAL DAILY
Qty: 30 TABLET | Refills: 5 | Status: SHIPPED | OUTPATIENT
Start: 2019-01-25 | End: 2019-05-31

## 2019-03-28 ENCOUNTER — HOSPITAL ENCOUNTER (OUTPATIENT)
Dept: GENERAL RADIOLOGY | Age: 54
Discharge: HOME | End: 2019-03-28
Payer: COMMERCIAL

## 2019-03-28 ENCOUNTER — HOSPITAL ENCOUNTER (OUTPATIENT)
Age: 54
Discharge: HOME | End: 2019-03-28
Payer: COMMERCIAL

## 2019-03-28 DIAGNOSIS — M15.9 PRIMARY OSTEOARTHRITIS INVOLVING MULTIPLE JOINTS: ICD-10-CM

## 2019-03-28 DIAGNOSIS — M06.09 RHEUMATOID ARTHRITIS OF MULTIPLE SITES WITH NEGATIVE RHEUMATOID FACTOR (HCC): ICD-10-CM

## 2019-03-28 PROCEDURE — 73130 X-RAY EXAM OF HAND: CPT

## 2019-03-28 PROCEDURE — 71046 X-RAY EXAM CHEST 2 VIEWS: CPT

## 2019-03-28 PROCEDURE — 73560 X-RAY EXAM OF KNEE 1 OR 2: CPT

## 2019-03-29 ENCOUNTER — TELEPHONE (OUTPATIENT)
Dept: INTERNAL MEDICINE CLINIC | Age: 54
End: 2019-03-29

## 2019-03-29 DIAGNOSIS — M15.9 PRIMARY OSTEOARTHRITIS INVOLVING MULTIPLE JOINTS: ICD-10-CM

## 2019-03-29 DIAGNOSIS — M06.09 RHEUMATOID ARTHRITIS OF MULTIPLE SITES WITH NEGATIVE RHEUMATOID FACTOR (HCC): ICD-10-CM

## 2019-03-29 RX ORDER — MELOXICAM 15 MG/1
15 TABLET ORAL DAILY
Qty: 30 TABLET | Refills: 5 | Status: SHIPPED | OUTPATIENT
Start: 2019-03-29 | End: 2019-04-01 | Stop reason: SDUPTHER

## 2019-03-29 RX ORDER — MELOXICAM 15 MG/1
15 TABLET ORAL DAILY
Qty: 7 TABLET | Refills: 0 | Status: SHIPPED | OUTPATIENT
Start: 2019-03-29 | End: 2019-03-29 | Stop reason: SDUPTHER

## 2019-03-29 NOTE — TELEPHONE ENCOUNTER
Pt calling needs refills of Meloxicam and Methotrexate---pt is out of medication---please send to [facility]. Thanks.

## 2019-04-01 ENCOUNTER — OFFICE VISIT (OUTPATIENT)
Dept: RHEUMATOLOGY | Age: 54
End: 2019-04-01
Payer: COMMERCIAL

## 2019-04-01 VITALS
HEIGHT: 62 IN | HEART RATE: 81 BPM | SYSTOLIC BLOOD PRESSURE: 127 MMHG | DIASTOLIC BLOOD PRESSURE: 85 MMHG | WEIGHT: 195.3 LBS | BODY MASS INDEX: 35.94 KG/M2

## 2019-04-01 DIAGNOSIS — L50.9 HIVES: ICD-10-CM

## 2019-04-01 DIAGNOSIS — M06.09 RHEUMATOID ARTHRITIS OF MULTIPLE SITES WITH NEGATIVE RHEUMATOID FACTOR (HCC): Primary | ICD-10-CM

## 2019-04-01 DIAGNOSIS — M15.9 PRIMARY OSTEOARTHRITIS INVOLVING MULTIPLE JOINTS: ICD-10-CM

## 2019-04-01 DIAGNOSIS — Z51.11 MAINTENANCE CHEMOTHERAPY: ICD-10-CM

## 2019-04-01 DIAGNOSIS — M06.09 RHEUMATOID ARTHRITIS OF MULTIPLE SITES WITH NEGATIVE RHEUMATOID FACTOR (HCC): ICD-10-CM

## 2019-04-01 LAB
ALT SERPL-CCNC: 13 U/L (ref 10–40)
AST SERPL-CCNC: 17 U/L (ref 15–37)
BASOPHILS ABSOLUTE: 0 K/UL (ref 0–0.2)
BASOPHILS RELATIVE PERCENT: 0.2 %
CREAT SERPL-MCNC: 0.6 MG/DL (ref 0.6–1.1)
EOSINOPHILS ABSOLUTE: 0 K/UL (ref 0–0.6)
EOSINOPHILS RELATIVE PERCENT: 0.4 %
GFR AFRICAN AMERICAN: >60
GFR NON-AFRICAN AMERICAN: >60
HCT VFR BLD CALC: 39.4 % (ref 36–48)
HEMOGLOBIN: 12.7 G/DL (ref 12–16)
LYMPHOCYTES ABSOLUTE: 2.7 K/UL (ref 1–5.1)
LYMPHOCYTES RELATIVE PERCENT: 44.3 %
MCH RBC QN AUTO: 28.7 PG (ref 26–34)
MCHC RBC AUTO-ENTMCNC: 32.1 G/DL (ref 31–36)
MCV RBC AUTO: 89.3 FL (ref 80–100)
MONOCYTES ABSOLUTE: 0.2 K/UL (ref 0–1.3)
MONOCYTES RELATIVE PERCENT: 3.7 %
NEUTROPHILS ABSOLUTE: 3.1 K/UL (ref 1.7–7.7)
NEUTROPHILS RELATIVE PERCENT: 51.4 %
PDW BLD-RTO: 16.2 % (ref 12.4–15.4)
PLATELET # BLD: 308 K/UL (ref 135–450)
PMV BLD AUTO: 8.9 FL (ref 5–10.5)
RBC # BLD: 4.41 M/UL (ref 4–5.2)
WBC # BLD: 6 K/UL (ref 4–11)

## 2019-04-01 PROCEDURE — 20610 DRAIN/INJ JOINT/BURSA W/O US: CPT | Performed by: INTERNAL MEDICINE

## 2019-04-01 PROCEDURE — 99214 OFFICE O/P EST MOD 30 MIN: CPT | Performed by: INTERNAL MEDICINE

## 2019-04-01 RX ORDER — MELOXICAM 15 MG/1
15 TABLET ORAL DAILY
Qty: 30 TABLET | Refills: 5 | Status: SHIPPED | OUTPATIENT
Start: 2019-04-01 | End: 2019-05-31 | Stop reason: SDUPTHER

## 2019-04-01 RX ORDER — METHYLPREDNISOLONE 4 MG/1
TABLET ORAL
Qty: 1 KIT | Refills: 0 | Status: SHIPPED | OUTPATIENT
Start: 2019-04-01 | End: 2019-04-07

## 2019-04-01 RX ORDER — LIDOCAINE HYDROCHLORIDE 10 MG/ML
2 INJECTION, SOLUTION INFILTRATION; PERINEURAL ONCE
Status: COMPLETED | OUTPATIENT
Start: 2019-04-01 | End: 2019-04-01

## 2019-04-01 RX ORDER — TRIAMCINOLONE ACETONIDE 40 MG/ML
80 INJECTION, SUSPENSION INTRA-ARTICULAR; INTRAMUSCULAR ONCE
Status: COMPLETED | OUTPATIENT
Start: 2019-04-01 | End: 2019-04-01

## 2019-04-01 RX ADMIN — TRIAMCINOLONE ACETONIDE 80 MG: 40 INJECTION, SUSPENSION INTRA-ARTICULAR; INTRAMUSCULAR at 11:27

## 2019-04-01 RX ADMIN — LIDOCAINE HYDROCHLORIDE 2 ML: 10 INJECTION, SOLUTION INFILTRATION; PERINEURAL at 11:26

## 2019-04-01 NOTE — PROGRESS NOTES
history: She presents for follow-up of Osteoarthritis and rheumatoid arthritis. She was placed on methotrexate 6 tablets once a week 8 weeks ago, she hasn't noticed any improvement in joint pain, swelling and stiffness. Symptoms are worse in her hands and knees. Left knee is worse than the right knee. She did not tolerate sulfasalazine [developed nausea, abdominal pain and diarrhea]. She did not tolerate Arava either [hair loss]. She has a morning stiffness lasting for one hour. She is on meloxicam 15 mg daily. X-rays of the hands consistent with erosive osteoarthritis. Knee x-rays mild degenerative changes. She has also developed hives for past few days, she denies any change in the medications or food. She gets on and off hives lasting for few days at a time. She is taking Benadryl as needed. Blood work shows low titer positive CCP, elevated ESR and CRP. HPI  ROS    REVIEW OF SYSTEMS:   Constitutional: No unanticipated weight loss or fevers. No fatigue and malaise. Integumentary: No photosensitivity, malar rash, livedo reticularis, alopecia and Raynaud's symptoms, sclerodactyly, skin tightening  Eyes: negative for dry eyes, visual disturbance and persistent redness, discharge from eyes   ENT: - No tinnitus, loss of hearing, vertigo, or recurrent ear infections.  - No history of nasal/oral ulcers. - No history of sicca symptoms. Cardiovascular: No history of pericarditis, chest pain or murmur or palpitations  Respiratory: No shortness of breath, cough or history of interstitial lung disease. No history of pleurisy. No history of tuberculosis or atypical infections. Gastrointestinal: No history of dysphagia or esophageal dysmotility. No change in bowel habits or any inflammatory bowel disease. Genitourinary: No history renal disease, miscarriages. Hematologic/Lymphatic: No  bleeding, blood clots or swollen lymph nodes. Neurological: No history seizure or focal weakness.  No history of neuropathies, paresthesias or hyperesthesias, facial droop, diplopia  Psychiatric: No history of bipolar disease, anxiety, depression  Endocrine: Denies any thyroid / parathyroid disease and osteoporosis  Allergic/Immunologic: No nasal congestion         I have reviewed patients Past medical History, Social History and Family History as mentioned in her chart and this remains unchanged from previous.     Past Medical History:   Diagnosis Date    Anemia     Fibroids     Rheumatoid arthritis ([facility] [address]      Past Surgical History:   Procedure Laterality Date    HYSTERECTOMY  10/2013    [facility], lso    TOOTH EXTRACTION  04/2018     Social History     Socioeconomic History    Marital status: Single     Spouse name: Not on file    Number of children: Not on file    Years of education: Not on file    Highest education level: Not on file   Occupational History    Not on file   Social Needs    Financial resource strain: Not on file    Food insecurity:     Worry: Not on file     Inability: Not on file    Transportation needs:     Medical: Not on file     Non-medical: Not on file   Tobacco Use    Smoking status: Never Smoker    Smokeless tobacco: Never Used   Substance and Sexual Activity    Alcohol use: Yes     Comment: RARELY    Drug use: No    Sexual activity: Yes     Partners: Male   Lifestyle    Physical activity:     Days per week: Not on file     Minutes per session: Not on file    Stress: Not on file   Relationships    Social connections:     Talks on phone: Not on file     Gets together: Not on file     Attends Yarsanism service: Not on file     Active member of club or organization: Not on file     Attends meetings of clubs or organizations: Not on file     Relationship status: Not on file    Intimate partner violence:     Fear of current or ex partner: Not on file     Emotionally abused: Not on file     Physically abused: Not on file     Forced sexual activity: Not on file   Other Topics Concern    Not on file   Social History Narrative    Not on file     Family History   Problem Relation Age of Onset    Diabetes Mother     Hypertension Mother     Heart Failure Mother     Heart Attack Mother     Diabetes Father     Cancer Sister         colon    Rheum Arthritis Neg Hx     Osteoarthritis Neg Hx     Asthma Neg Hx     Breast Cancer Neg Hx     High Cholesterol Neg Hx     Migraines Neg Hx     Ovarian Cancer Neg Hx     Rashes/Skin Problems Neg Hx     Seizures Neg Hx     Stroke Neg Hx     Thyroid Disease Neg Hx          PHYSICAL EXAM   Vitals:    04/01/19 0904   BP: 127/85   Site: Right Upper Arm   Pulse: 81   Weight: 195 lb 4.8 oz (88.6 kg)   Height: 5' 2\" (1.575 m)     Physical Exam  Constitutional:  Well developed, well nourished, no acute distress, non-toxic appearance   Musculoskeletal:    RIGHT  Swell  Tender  ROM  LEFT  Swell  Tender  ROM    DIP2  0  0  Heberden   0  0  Heberden    DIP3  0  0  Heberden   0  0  Heberden    DIP4  0  0  Heberden   0  0  Heberden    DIP5  0  0  Heberden   0  0  Heberden    PIP1  + + Bony change   + + Bony change    PIP2  + + Bony change   + + Bony change    PIP3  + + Bony change   + + Bony change    PIP4  + + Bony change   ++ ++ Bony change    PIP5  + + Bony change   0 0 Bony change    MCP1  0  0  FULL   0  0  FULL    MCP2  0  0  FULL   0  0  FULL    MCP3  0  0  FULL   0  0  FULL    MCP4  0  0  FULL   0  0  FULL    MCP5  0  0  FULL   0  0  FULL    Wrist  + + FULL   0  0  FULL    Elbow  0  0  FULL   0  0  FULL    Shouldr  0  0  FULL   0  0  FULL    Hip  0  0  FULL   0  + FULL    Knee  0 0 Crepitus   0 + Crepitus    Ankle  0  0  FULL   0  0  FULL    MTP1  0  0  FULL   0  0  FULL    MTP2  0  0  FULL   0  0  FULL    MTP3  0  0  FULL   0  0  FULL    MTP4  0  0  FULL   0  0  FULL    MTP5  0  0  FULL   0  0  FULL    IP1  0  0  FULL   0  0  FULL    IP2  0  0  FULL   0  0  FULL    IP3  0  0  FULL   0  0  FULL    IP4  0  0  FULL   0  0  FULL    IP5  0  0  FULL   0 0 FULL Ambulates without assistance, normal gait  Neck: Full ROM, no tenderness, supple   Back- no tenderness. Eyes:  PERRL, extra ocular movements intact, conjunctiva normal   HEENT:  Atraumatic, normocephalic, external ears normal, oropharynx moist, no pharyngeal exudates. Respiratory:  No respiratory distress  GI:  Soft, nondistended, normal bowel sounds, nontender, no organomegaly, no mass, no rebound, no guarding   :  No costovertebral angle tenderness   Integument:  Well hydrated, telangiectasias, hives+  Lymphatic:  No lymphadenopathy noted   Neurologic:   Alert & oriented x 3, CN 2-12 normal, no focal deficits noted. Sensations Intact. Muscle strength 5/5 proximally and distally in upper and lower extremities.    Psychiatric:  Speech and behavior appropriate           LABS AND IMAGING  Outside data reviewed and in HPI    Lab Results   Component Value Date    WBC 4.7 01/25/2019    RBC 3.97 01/25/2019    HGB 11.4 01/25/2019    HCT 34.7 01/25/2019     01/25/2019    MCV 87.6 01/25/2019    MCH 28.8 01/25/2019    MCHC 32.9 01/25/2019    RDW 14.9 01/25/2019    SEGSPCT 34 09/02/2017    LYMPHOPCT 54.0 01/25/2019    MONOPCT 6.0 01/25/2019    BASOPCT 0.9 01/25/2019    MONOSABS 0.3 01/25/2019    LYMPHSABS 2.6 01/25/2019    EOSABS 0.2 01/25/2019    BASOSABS 0.0 01/25/2019       Chemistry        Component Value Date/Time     05/14/2018 1403    K 3.8 05/14/2018 1403     05/14/2018 1403    CO2 26 05/14/2018 1403    BUN 11 05/14/2018 1403    CREATININE <0.5 (L) 01/25/2019 1104        Component Value Date/Time    CALCIUM 9.3 05/14/2018 1403    ALKPHOS 115 05/14/2018 1403    AST 22 01/25/2019 1104    ALT 17 01/25/2019 1104    BILITOT <0.2 05/14/2018 1403          Lab Results   Component Value Date    SEDRATE 34 (H) 09/28/2018     Lab Results   Component Value Date    CRP 19.5 (H) 09/28/2018     No results found for: JADEN, SHANIQUE, SSA, SSB, C3, C4  Lab Results   Component Value Date    RF <10.0 03/16/2018 CCPABIGG 26 03/16/2018     No results found for: JADEN, ANATITER, ANAINT, PATH  No results found for: [facility], DSDNAIGGIFA  Lab Results   Component Value Date    SSALAAB 0 03/16/2018     No results found for: SMAB, RNPAB  No results found for: CENTABIGG  No results found for: C3, C4, ACE  No results found for: JO1, VITD25, [id number]    ASSESSMENT AND PLAN      Assessment/Plan:      ASSESSMENT:    1. Rheumatoid arthritis of multiple sites with negative rheumatoid factor ([facility] [address]    2. Maintenance chemotherapy    3. Primary osteoarthritis involving multiple joints    4. Hives        PLAN:   1. Rheumatoid arthritis of multiple sites with negative rheumatoid factor (HCC)  Low titer positive CCP, elevated ESR and CRP and negative RF, hand x-rays with erosive osteoarthritis  - active synovitis on joint exam  -Discontinued leflunomide due to hair loss and sulfasalazine due to nausea and diarrhea  -No significant improvement with methotrexate  -We will start Enbrel 50 mg once a week  -Continue methotrexate 6 tablets once a week  -Check QuantiFERON TB Gold  - Etanercept (ENBREL MINI) 50 MG/ML SOCT; Inject 50 mg into the skin once a week  Dispense: 4 Cartridge; Refill: 5  - Quantiferon, Incubated; Future    2. Maintenance chemotherapy  - Creatinine, Serum  - CBC Auto Differential  - AST  - ALT  TNF INHIBITORS can cause increased risk of TB reactivation, oppurtunistic infections, lupus like illness, rash, hypersensitivity reaction, infusion reactions, demyelinating disease and possible risk of malignancies and lung diseases and were explained to the patient    3. Primary osteoarthritis involving multiple joints  Continue meloxicam 15 mg daily  -We will do left knee corticosteroid injection  - KS ARTHROCENTESIS ASPIR&/INJ MAJOR JT/BURSA W/O US  - triamcinolone acetonide (KENALOG-40) injection 80 mg  - lidocaine 1 % injection 2 mL    4.  Hives  Benadryl as needed and start Medrol Dosepak  - methylPREDNISolone (MEDROL DOSEPACK) 4 MG tablet; Take by mouth. Dispense: 1 kit; Refill: 0       PROCEDURE NOTE    JOINT ASPIRATION/INJECTION  Left knee corticosteroid injection:  The patient was informed about the risk and benefits of the procedure, including the risk of infection, hemorrhage and skin hypopigmentation from the corticosteroids. After informed consent was obtained, using sterile technique, the left anterior medial area was prepped and chlorhexidine was used as local anesthetic. The joint was entered and Kenalog 80 mg and 2 ml plain Lidocaine was then injected and the needle withdrawn. The procedure was well tolerated. No immediate complication noticed. The patient is asked to continue to rest the joint for a few more days before resuming regular activities. Advised patient to watch for fever, increased swelling or persistent pain in the joint. Call or return to clinic prn if such symptoms occur or there is failure to improve as anticipated. The patient indicates understanding of these issues and agrees with the plan. Return in about 6 weeks (around 5/13/2019). The risks and benefits of my recommendations, as well as other treatment options, benefits and side effects were discussed with the patient. All questions were answered. ######################################################################    I thank you for giving me the opportunity to participate in [facility] care. If you have any questions or concerns please feel free to contact me. I look forward to following  Sirisha Haskins along with you. Electronically signed by: Jennifer Diehl MD, 4/1/2019 9:49 AM    Documentation was done using voice recognition dragon software. Every effort was made to ensure accuracy; however, inadvertent unintentional computerized transcription errors may be present.

## 2019-04-05 LAB
QUANTI TB GOLD PLUS: NEGATIVE
QUANTI TB1 MINUS NIL: 0 IU/ML (ref 0–0.34)
QUANTI TB2 MINUS NIL: 0 IU/ML (ref 0–0.34)
QUANTIFERON MITOGEN: 3.38 IU/ML
QUANTIFERON NIL: 0.03 IU/ML

## 2019-04-12 ENCOUNTER — TELEPHONE (OUTPATIENT)
Dept: RHEUMATOLOGY | Age: 54
End: 2019-04-12

## 2019-05-31 ENCOUNTER — OFFICE VISIT (OUTPATIENT)
Dept: RHEUMATOLOGY | Age: 54
End: 2019-05-31
Payer: COMMERCIAL

## 2019-05-31 VITALS
HEIGHT: 62 IN | BODY MASS INDEX: 35.7 KG/M2 | HEART RATE: 64 BPM | WEIGHT: 194 LBS | SYSTOLIC BLOOD PRESSURE: 149 MMHG | DIASTOLIC BLOOD PRESSURE: 89 MMHG

## 2019-05-31 DIAGNOSIS — Z51.11 MAINTENANCE CHEMOTHERAPY: ICD-10-CM

## 2019-05-31 DIAGNOSIS — M15.9 PRIMARY OSTEOARTHRITIS INVOLVING MULTIPLE JOINTS: ICD-10-CM

## 2019-05-31 DIAGNOSIS — M06.09 RHEUMATOID ARTHRITIS OF MULTIPLE SITES WITH NEGATIVE RHEUMATOID FACTOR (HCC): Primary | ICD-10-CM

## 2019-05-31 LAB
ALT SERPL-CCNC: 17 U/L (ref 10–40)
AST SERPL-CCNC: 19 U/L (ref 15–37)
BASOPHILS ABSOLUTE: 0 K/UL (ref 0–0.2)
BASOPHILS RELATIVE PERCENT: 0.4 %
CREAT SERPL-MCNC: 0.6 MG/DL (ref 0.6–1.1)
EOSINOPHILS ABSOLUTE: 0.2 K/UL (ref 0–0.6)
EOSINOPHILS RELATIVE PERCENT: 2.9 %
GFR AFRICAN AMERICAN: >60
GFR NON-AFRICAN AMERICAN: >60
HCT VFR BLD CALC: 34.1 % (ref 36–48)
HEMOGLOBIN: 11.3 G/DL (ref 12–16)
LYMPHOCYTES ABSOLUTE: 2.7 K/UL (ref 1–5.1)
LYMPHOCYTES RELATIVE PERCENT: 47.6 %
MCH RBC QN AUTO: 29.6 PG (ref 26–34)
MCHC RBC AUTO-ENTMCNC: 33.1 G/DL (ref 31–36)
MCV RBC AUTO: 89.4 FL (ref 80–100)
MONOCYTES ABSOLUTE: 0.3 K/UL (ref 0–1.3)
MONOCYTES RELATIVE PERCENT: 5.5 %
NEUTROPHILS ABSOLUTE: 2.4 K/UL (ref 1.7–7.7)
NEUTROPHILS RELATIVE PERCENT: 43.6 %
PDW BLD-RTO: 15.4 % (ref 12.4–15.4)
PLATELET # BLD: 271 K/UL (ref 135–450)
PMV BLD AUTO: 8.6 FL (ref 5–10.5)
RBC # BLD: 3.81 M/UL (ref 4–5.2)
WBC # BLD: 5.6 K/UL (ref 4–11)

## 2019-05-31 PROCEDURE — 99214 OFFICE O/P EST MOD 30 MIN: CPT | Performed by: INTERNAL MEDICINE

## 2019-05-31 RX ORDER — MELOXICAM 15 MG/1
15 TABLET ORAL DAILY
Qty: 30 TABLET | Refills: 5 | Status: SHIPPED | OUTPATIENT
Start: 2019-05-31 | End: 2019-10-04

## 2019-05-31 NOTE — PROGRESS NOTES
2019  Patient Name: Alpesh King  : 1965  Medical Record: [id number]    MEDICATIONS  Current Outpatient Medications   Medication Sig Dispense Refill    meloxicam (MOBIC) 15 MG tablet Take 1 tablet by mouth daily 30 tablet 5    Etanercept (ENBREL MINI) 50 MG/ML SOCT Inject 50 mg into the skin once a week 4 Cartridge 5     No current facility-administered medications for this visit. ALLERGIES  No Known Allergies      Comments  No specialty comments available. Background history:  Alpesh King is a 49 y.o. female who is being seen for follow up evaluation of Joint pain. She is complaining of pain in hands, left hip and left knee. Pain in the hands started 2 years ago and is progressively getting worse. She describes her pain as constant, aching, 5-6 out of 10 without any significant relieving or aggravating factors. Pain is located in PIP, MCP, DIP joints. Pain in the left hip is located on the lateral side and gets worse on laying on the left side. Knees crack and pop. Knee pain gets worse going up and down the steps. She has swelling in the knuckles, stiffness throughout the day. She also has difficulty opening doorknobs and jar cans. She is on meloxicam 7.5 mg daily without improvement. She denies any history of psoriasis, inflammatory bowel disease, inflammatory back pain, uveitis, enthesitis, tenosynovitis or dactylitis. Interim history: She presents for follow-up of Osteoarthritis and rheumatoid arthritis. She is on Enbrel 50 g once a week for past 5 weeks. She has noted significant improvement in pain, swelling and stiffness in her hands. She continues to have pain and stiffness in the left knee which is worse towards end of the day. She received corticosteroid injection in the left knee without any significant benefit. She is off of methotrexate. She did not tolerate sulfasalazine [developed nausea, abdominal pain and diarrhea].     She did not tolerate Lexis either [hair loss]. She is on meloxicam 15 mg daily. X-rays of the hands consistent with erosive osteoarthritis. Knee x-rays mild degenerative changes. Blood work shows low titer positive CCP, elevated ESR and CRP. He denies any side effects with Enbrel. She denies recent fevers or infections. HPI  ROS    REVIEW OF SYSTEMS:   Constitutional: No unanticipated weight loss or fevers. No fatigue and malaise. Integumentary: No photosensitivity, malar rash, livedo reticularis, alopecia and Raynaud's symptoms, sclerodactyly, skin tightening  Eyes: negative for dry eyes, visual disturbance and persistent redness, discharge from eyes   ENT: - No tinnitus, loss of hearing, vertigo, or recurrent ear infections.  - No history of nasal/oral ulcers. - No history of sicca symptoms. Cardiovascular: No history of pericarditis, chest pain or murmur or palpitations  Respiratory: No shortness of breath, cough or history of interstitial lung disease. No history of pleurisy. No history of tuberculosis or atypical infections. Gastrointestinal: No history of dysphagia or esophageal dysmotility. No change in bowel habits or any inflammatory bowel disease. Genitourinary: No history renal disease, miscarriages. Hematologic/Lymphatic: No  bleeding, blood clots or swollen lymph nodes. Neurological: No history seizure or focal weakness. No history of neuropathies, paresthesias or hyperesthesias, facial droop, diplopia  Psychiatric: No history of bipolar disease, anxiety, depression  Endocrine: Denies any thyroid / parathyroid disease and osteoporosis  Allergic/Immunologic: No nasal congestion         I have reviewed patients Past medical History, Social History and Family History as mentioned in her chart and this remains unchanged from previous.     Past Medical History:   Diagnosis Date    Anemia     Fibroids     Rheumatoid arthritis ([facility] [address]      Past Surgical History:   Procedure Laterality Date    HYSTERECTOMY  10/2013 2-12 normal, no focal deficits noted. Sensations Intact. Muscle strength 5/5 proximally and distally in upper and lower extremities. Psychiatric:  Speech and behavior appropriate           LABS AND IMAGING  Outside data reviewed and in HPI    Lab Results   Component Value Date    WBC 6.0 04/01/2019    RBC 4.41 04/01/2019    HGB 12.7 04/01/2019    HCT 39.4 04/01/2019     04/01/2019    MCV 89.3 04/01/2019    MCH 28.7 04/01/2019    MCHC 32.1 04/01/2019    RDW 16.2 04/01/2019    SEGSPCT 34 09/02/2017    LYMPHOPCT 44.3 04/01/2019    MONOPCT 3.7 04/01/2019    BASOPCT 0.2 04/01/2019    MONOSABS 0.2 04/01/2019    LYMPHSABS 2.7 04/01/2019    EOSABS 0.0 04/01/2019    BASOSABS 0.0 04/01/2019       Chemistry        Component Value Date/Time     05/14/2018 1403    K 3.8 05/14/2018 1403     05/14/2018 1403    CO2 26 05/14/2018 1403    BUN 11 05/14/2018 1403    CREATININE 0.6 04/01/2019 1022        Component Value Date/Time    CALCIUM 9.3 05/14/2018 1403    ALKPHOS 115 05/14/2018 1403    AST 17 04/01/2019 1022    ALT 13 04/01/2019 1022    BILITOT <0.2 05/14/2018 1403          Lab Results   Component Value Date    SEDRATE 34 (H) 09/28/2018     Lab Results   Component Value Date    CRP 19.5 (H) 09/28/2018     No results found for: JADEN, SHANIUQE, SSA, SSB, C3, C4  Lab Results   Component Value Date    RF <10.0 03/16/2018    CCPABIGG 26 03/16/2018     No results found for: JADEN, ANATITER, ANAINT, PATH  No results found for: DSDNAG, DSDNAIGGIFA  Lab Results   Component Value Date    SSALAAB 0 03/16/2018     No results found for: SMAB, RNPAB  No results found for: CENTABIGG  No results found for: C3, C4, ACE  No results found for: JO1, VITD25, XEX26OMMUM    ASSESSMENT AND PLAN      Assessment/Plan:      ASSESSMENT:    1. Rheumatoid arthritis of multiple sites with negative rheumatoid factor ([facility] [address]    2. Primary osteoarthritis involving multiple joints    3. Maintenance chemotherapy        PLAN:   1.  Rheumatoid arthritis of multiple sites with negative rheumatoid factor (HCC)  Low titer positive CCP, elevated ESR and CRP and negative RF, hand x-rays with erosive osteoarthritis  -NO active synovitis on joint exam  -Continue Enbrel 50 mG once a week  -Discontinued leflunomide due to hair loss and sulfasalazine due to nausea and diarrhea  -No significant improvement with methotrexate    2. Primary osteoarthritis involving multiple joints  Significant improvement in left knee symptoms with corticosteroid injection. I will do left knee MRI to evaluate further  -Continue Mobic 15 mg daily  - meloxicam (MOBIC) 15 MG tablet; Take 1 tablet by mouth daily  Dispense: 30 tablet; Refill: 5  - MRI KNEE LEFT WO CONTRAST; Future    3. Maintenance chemotherapy  Labs reviewed  - Creatinine, Serum  - CBC Auto Differential  - AST  - ALT  The patient indicates understanding of these issues and agrees with the plan. Return in about 3 months (around 8/31/2019). The risks and benefits of my recommendations, as well as other treatment options, benefits and side effects were discussed with the patient. All questions were answered. ######################################################################    I thank you for giving me the opportunity to participate in [facility]. If you have any questions or concerns please feel free to contact me. I look forward to following  Orlando Ramires along with you. Electronically signed by: Beena Carbajal MD, 5/31/2019 9:52 AM    Documentation was done using voice recognition dragon software. Every effort was made to ensure accuracy; however, inadvertent unintentional computerized transcription errors may be present.

## 2019-07-11 ENCOUNTER — TELEPHONE (OUTPATIENT)
Dept: GYNECOLOGY | Age: 54
End: 2019-07-11

## 2019-07-11 DIAGNOSIS — N32.81 URGENCY-FREQUENCY SYNDROME: Primary | ICD-10-CM

## 2019-07-11 NOTE — TELEPHONE ENCOUNTER
Patient would like an order for a urine test for possible UTI. (Patient would not give further information)  Patient did make appointment for pap in late August (first available). Patient can be reached at [phone].

## 2019-07-12 RX ORDER — SULFAMETHOXAZOLE AND TRIMETHOPRIM 400; 80 MG/1; MG/1
1 TABLET ORAL 2 TIMES DAILY
Qty: 14 TABLET | Refills: 0 | Status: SHIPPED | OUTPATIENT
Start: 2019-07-12 | End: 2019-07-19

## 2019-08-22 ENCOUNTER — TELEPHONE (OUTPATIENT)
Dept: INTERNAL MEDICINE CLINIC | Age: 54
End: 2019-08-22

## 2019-08-22 ENCOUNTER — OFFICE VISIT (OUTPATIENT)
Dept: RHEUMATOLOGY | Age: 54
End: 2019-08-22
Payer: COMMERCIAL

## 2019-08-22 VITALS
SYSTOLIC BLOOD PRESSURE: 129 MMHG | HEIGHT: 62 IN | WEIGHT: 201 LBS | HEART RATE: 78 BPM | DIASTOLIC BLOOD PRESSURE: 82 MMHG | BODY MASS INDEX: 36.99 KG/M2

## 2019-08-22 DIAGNOSIS — Z51.11 MAINTENANCE CHEMOTHERAPY: ICD-10-CM

## 2019-08-22 DIAGNOSIS — M15.9 PRIMARY OSTEOARTHRITIS INVOLVING MULTIPLE JOINTS: ICD-10-CM

## 2019-08-22 DIAGNOSIS — M06.09 RHEUMATOID ARTHRITIS OF MULTIPLE SITES WITH NEGATIVE RHEUMATOID FACTOR (HCC): Primary | ICD-10-CM

## 2019-08-22 LAB
A/G RATIO: 1.5 (ref 1.1–2.2)
ALBUMIN SERPL-MCNC: 4.3 G/DL (ref 3.4–5)
ALP BLD-CCNC: 138 U/L (ref 40–129)
ALT SERPL-CCNC: 14 U/L (ref 10–40)
ANION GAP SERPL CALCULATED.3IONS-SCNC: 14 MMOL/L (ref 3–16)
AST SERPL-CCNC: 20 U/L (ref 15–37)
BASOPHILS ABSOLUTE: 0 K/UL (ref 0–0.2)
BASOPHILS RELATIVE PERCENT: 0.5 %
BILIRUB SERPL-MCNC: <0.2 MG/DL (ref 0–1)
BUN BLDV-MCNC: 13 MG/DL (ref 7–20)
C-REACTIVE PROTEIN: 27.3 MG/L (ref 0–5.1)
CALCIUM SERPL-MCNC: 9.7 MG/DL (ref 8.3–10.6)
CHLORIDE BLD-SCNC: 103 MMOL/L (ref 99–110)
CO2: 24 MMOL/L (ref 21–32)
CREAT SERPL-MCNC: 0.6 MG/DL (ref 0.6–1.1)
EOSINOPHILS ABSOLUTE: 0.2 K/UL (ref 0–0.6)
EOSINOPHILS RELATIVE PERCENT: 2.8 %
GFR AFRICAN AMERICAN: >60
GFR NON-AFRICAN AMERICAN: >60
GLOBULIN: 2.9 G/DL
GLUCOSE BLD-MCNC: 99 MG/DL (ref 70–99)
HCT VFR BLD CALC: 35.7 % (ref 36–48)
HEMOGLOBIN: 11.4 G/DL (ref 12–16)
LYMPHOCYTES ABSOLUTE: 3.8 K/UL (ref 1–5.1)
LYMPHOCYTES RELATIVE PERCENT: 60.6 %
MCH RBC QN AUTO: 28.3 PG (ref 26–34)
MCHC RBC AUTO-ENTMCNC: 32 G/DL (ref 31–36)
MCV RBC AUTO: 88.5 FL (ref 80–100)
MONOCYTES ABSOLUTE: 0.3 K/UL (ref 0–1.3)
MONOCYTES RELATIVE PERCENT: 5.2 %
NEUTROPHILS ABSOLUTE: 1.9 K/UL (ref 1.7–7.7)
NEUTROPHILS RELATIVE PERCENT: 30.9 %
PDW BLD-RTO: 14.8 % (ref 12.4–15.4)
PLATELET # BLD: 241 K/UL (ref 135–450)
PMV BLD AUTO: 9.3 FL (ref 5–10.5)
POTASSIUM SERPL-SCNC: 3.9 MMOL/L (ref 3.5–5.1)
RBC # BLD: 4.03 M/UL (ref 4–5.2)
SEDIMENTATION RATE, ERYTHROCYTE: 31 MM/HR (ref 0–30)
SODIUM BLD-SCNC: 141 MMOL/L (ref 136–145)
TOTAL PROTEIN: 7.2 G/DL (ref 6.4–8.2)
WBC # BLD: 6.2 K/UL (ref 4–11)

## 2019-08-22 PROCEDURE — 99214 OFFICE O/P EST MOD 30 MIN: CPT | Performed by: INTERNAL MEDICINE

## 2019-08-22 PROCEDURE — 20610 DRAIN/INJ JOINT/BURSA W/O US: CPT | Performed by: INTERNAL MEDICINE

## 2019-08-22 RX ORDER — TRIAMCINOLONE ACETONIDE 40 MG/ML
80 INJECTION, SUSPENSION INTRA-ARTICULAR; INTRAMUSCULAR ONCE
Status: COMPLETED | OUTPATIENT
Start: 2019-08-22 | End: 2019-08-22

## 2019-08-22 RX ORDER — LIDOCAINE HYDROCHLORIDE 10 MG/ML
2 INJECTION, SOLUTION INFILTRATION; PERINEURAL ONCE
Status: COMPLETED | OUTPATIENT
Start: 2019-08-22 | End: 2019-08-22

## 2019-08-22 RX ADMIN — TRIAMCINOLONE ACETONIDE 80 MG: 40 INJECTION, SUSPENSION INTRA-ARTICULAR; INTRAMUSCULAR at 12:39

## 2019-08-22 RX ADMIN — LIDOCAINE HYDROCHLORIDE 2 ML: 10 INJECTION, SOLUTION INFILTRATION; PERINEURAL at 12:38

## 2019-08-22 NOTE — TELEPHONE ENCOUNTER
Pt c/o joelle knee pain with swelling ,since Saturday worse every day Pt has not taken anything for the pain, Pt denies any injury or fall    Pharm Wg [facility]

## 2019-08-22 NOTE — TELEPHONE ENCOUNTER
Please call the patient and advise if she can come into the clinic today. We have 2 openings in the afternoon.

## 2019-08-22 NOTE — PROGRESS NOTES
off of methotrexate. She did not tolerate sulfasalazine [developed nausea, abdominal pain and diarrhea]. She did not tolerate Arava either [hair loss]. She is on meloxicam 15 mg daily. X-rays of the hands consistent with erosive osteoarthritis. Knee x-rays mild degenerative changes. Blood work shows low titer positive CCP, elevated ESR and CRP. He denies any side effects with Enbrel. She denies recent fevers or infections. HPI  ROS    REVIEW OF SYSTEMS:   Constitutional: No unanticipated weight loss or fevers. No fatigue and malaise. Integumentary: No photosensitivity, malar rash, livedo reticularis, alopecia and Raynaud's symptoms, sclerodactyly, skin tightening  Eyes: negative for dry eyes, visual disturbance and persistent redness, discharge from eyes   ENT: - No tinnitus, loss of hearing, vertigo, or recurrent ear infections.  - No history of nasal/oral ulcers. - No history of sicca symptoms. Cardiovascular: No history of pericarditis, chest pain or murmur or palpitations  Respiratory: No shortness of breath, cough or history of interstitial lung disease. No history of pleurisy. No history of tuberculosis or atypical infections. Gastrointestinal: No history of dysphagia or esophageal dysmotility. No change in bowel habits or any inflammatory bowel disease. Genitourinary: No history renal disease, miscarriages. Hematologic/Lymphatic: No  bleeding, blood clots or swollen lymph nodes. Neurological: No history seizure or focal weakness. No history of neuropathies, paresthesias or hyperesthesias, facial droop, diplopia  Psychiatric: No history of bipolar disease, anxiety, depression  Endocrine: Denies any thyroid / parathyroid disease and osteoporosis  Allergic/Immunologic: No nasal congestion         I have reviewed patients Past medical History, Social History and Family History as mentioned in her chart and this remains unchanged from previous.     Past Medical History:   Diagnosis Date    PLAN      Assessment/Plan:      ASSESSMENT:    1. Rheumatoid arthritis of multiple sites with negative rheumatoid factor ([facility] [address]    2. Primary osteoarthritis involving multiple joints    3. Maintenance chemotherapy        PLAN:   1. Rheumatoid arthritis of multiple sites with negative rheumatoid factor (HCC)  Low titer positive CCP, elevated ESR and CRP and negative RF, hand x-rays with erosive osteoarthritis  -NO active synovitis on joint exam  -Continue Enbrel 50 mG once a week  -Discontinued leflunomide due to hair loss and sulfasalazine due to nausea and diarrhea  -No significant improvement with methotrexate    - C-Reactive Protein; Future  - Sedimentation Rate; Future    2. Primary osteoarthritis involving multiple joints  Bilateral knee corticosteroid injection. Continue Mobic 15 mg daily. If no improvement will recommend MRI of the knee to evaluate further  - [place] ARTHROCENTESIS ASPIR&/INJ MAJOR JT/BURSA W/O US    3. Maintenance chemotherapy  Labs reviewed. - CBC Auto Differential; Future  - Comprehensive Metabolic Panel; Future      PROCEDURE NOTE    JOINT ASPIRATION/INJECTION  Bilateral knee corticosteroid injection:  The patient was informed about the risk and benefits of the procedure, including the risk of infection, hemorrhage and skin hypopigmentation from the corticosteroids. After informed consent was obtained, using sterile technique, the anterior medial area was prepped and chlorhexidine was used as local anesthetic. The joint was entered and Kenalog 80 mg and 2 ml plain Lidocaine was then injected and the needle withdrawn. The procedure was well tolerated. No immediate complication noticed. The patient is asked to continue to rest the joint for a few more days before resuming regular activities. Advised patient to watch for fever, increased swelling or persistent pain in the joint. Call or return to clinic prn if such symptoms occur or there is failure to improve as anticipated.        The

## 2019-09-25 DIAGNOSIS — M06.09 RHEUMATOID ARTHRITIS OF MULTIPLE SITES WITH NEGATIVE RHEUMATOID FACTOR (HCC): ICD-10-CM

## 2019-10-04 ENCOUNTER — HOSPITAL ENCOUNTER (OUTPATIENT)
Age: 54
Discharge: HOME | End: 2019-10-04
Payer: COMMERCIAL

## 2019-10-04 ENCOUNTER — HOSPITAL ENCOUNTER (OUTPATIENT)
Dept: GENERAL RADIOLOGY | Age: 54
Discharge: HOME | End: 2019-10-04
Payer: COMMERCIAL

## 2019-10-04 ENCOUNTER — TELEPHONE (OUTPATIENT)
Dept: FAMILY MEDICINE CLINIC | Age: 54
End: 2019-10-04

## 2019-10-04 ENCOUNTER — OFFICE VISIT (OUTPATIENT)
Dept: FAMILY MEDICINE CLINIC | Age: 54
End: 2019-10-04
Payer: COMMERCIAL

## 2019-10-04 VITALS
BODY MASS INDEX: 36.84 KG/M2 | DIASTOLIC BLOOD PRESSURE: 70 MMHG | OXYGEN SATURATION: 96 % | HEART RATE: 79 BPM | SYSTOLIC BLOOD PRESSURE: 122 MMHG | WEIGHT: 201.4 LBS

## 2019-10-04 DIAGNOSIS — R10.84 GENERALIZED ABDOMINAL PAIN: Primary | ICD-10-CM

## 2019-10-04 DIAGNOSIS — R10.84 GENERALIZED ABDOMINAL PAIN: ICD-10-CM

## 2019-10-04 DIAGNOSIS — K21.9 GASTROESOPHAGEAL REFLUX DISEASE WITHOUT ESOPHAGITIS: ICD-10-CM

## 2019-10-04 PROCEDURE — 74019 RADEX ABDOMEN 2 VIEWS: CPT

## 2019-10-04 PROCEDURE — 99213 OFFICE O/P EST LOW 20 MIN: CPT | Performed by: PHYSICIAN ASSISTANT

## 2019-10-04 RX ORDER — MELOXICAM 15 MG/1
TABLET ORAL
Refills: 5 | COMMUNITY
Start: 2019-09-09 | End: 2020-04-20 | Stop reason: SDUPTHER

## 2019-10-04 ASSESSMENT — ENCOUNTER SYMPTOMS
DIARRHEA: 0
ABDOMINAL DISTENTION: 1
NAUSEA: 0
SHORTNESS OF BREATH: 0
CONSTIPATION: 1
VOMITING: 0
BACK PAIN: 0
ABDOMINAL PAIN: 1

## 2019-10-04 ASSESSMENT — PATIENT HEALTH QUESTIONNAIRE - PHQ9
2. FEELING DOWN, DEPRESSED OR HOPELESS: 0
SUM OF ALL RESPONSES TO PHQ QUESTIONS 1-9: 0
SUM OF ALL RESPONSES TO PHQ9 QUESTIONS 1 & 2: 0
1. LITTLE INTEREST OR PLEASURE IN DOING THINGS: 0
SUM OF ALL RESPONSES TO PHQ QUESTIONS 1-9: 0

## 2019-10-15 ENCOUNTER — TELEPHONE (OUTPATIENT)
Dept: GYNECOLOGY | Age: 54
End: 2019-10-15

## 2019-10-15 DIAGNOSIS — N92.6 IRREGULAR MENSTRUAL BLEEDING: ICD-10-CM

## 2019-10-15 DIAGNOSIS — R10.2 PELVIC PAIN IN FEMALE: Primary | ICD-10-CM

## 2019-10-22 ENCOUNTER — HOSPITAL ENCOUNTER (OUTPATIENT)
Dept: ULTRASOUND IMAGING | Age: 54
Discharge: HOME | End: 2019-10-22
Payer: COMMERCIAL

## 2019-10-22 DIAGNOSIS — R10.2 PELVIC PAIN IN FEMALE: ICD-10-CM

## 2019-10-22 DIAGNOSIS — N92.6 IRREGULAR MENSTRUAL BLEEDING: ICD-10-CM

## 2019-10-22 PROCEDURE — 76830 TRANSVAGINAL US NON-OB: CPT

## 2019-10-22 PROCEDURE — 76856 US EXAM PELVIC COMPLETE: CPT

## 2019-11-05 ENCOUNTER — OFFICE VISIT (OUTPATIENT)
Dept: RHEUMATOLOGY | Age: 54
End: 2019-11-05
Payer: COMMERCIAL

## 2019-11-05 VITALS
WEIGHT: 201 LBS | SYSTOLIC BLOOD PRESSURE: 142 MMHG | DIASTOLIC BLOOD PRESSURE: 90 MMHG | BODY MASS INDEX: 36.99 KG/M2 | HEIGHT: 62 IN

## 2019-11-05 DIAGNOSIS — Z51.11 MAINTENANCE CHEMOTHERAPY: ICD-10-CM

## 2019-11-05 DIAGNOSIS — M06.09 RHEUMATOID ARTHRITIS OF MULTIPLE SITES WITH NEGATIVE RHEUMATOID FACTOR (HCC): Primary | ICD-10-CM

## 2019-11-05 DIAGNOSIS — M15.9 PRIMARY OSTEOARTHRITIS INVOLVING MULTIPLE JOINTS: ICD-10-CM

## 2019-11-05 LAB
A/G RATIO: 1.4 (ref 1.1–2.2)
ALBUMIN SERPL-MCNC: 4.4 G/DL (ref 3.4–5)
ALP BLD-CCNC: 128 U/L (ref 40–129)
ALT SERPL-CCNC: 21 U/L (ref 10–40)
ANION GAP SERPL CALCULATED.3IONS-SCNC: 15 MMOL/L (ref 3–16)
AST SERPL-CCNC: 24 U/L (ref 15–37)
BASOPHILS ABSOLUTE: 0 K/UL (ref 0–0.2)
BASOPHILS RELATIVE PERCENT: 0.6 %
BILIRUB SERPL-MCNC: 0.3 MG/DL (ref 0–1)
BUN BLDV-MCNC: 13 MG/DL (ref 7–20)
CALCIUM SERPL-MCNC: 9.9 MG/DL (ref 8.3–10.6)
CHLORIDE BLD-SCNC: 99 MMOL/L (ref 99–110)
CO2: 26 MMOL/L (ref 21–32)
CREAT SERPL-MCNC: 0.6 MG/DL (ref 0.6–1.1)
EOSINOPHILS ABSOLUTE: 0.1 K/UL (ref 0–0.6)
EOSINOPHILS RELATIVE PERCENT: 1.5 %
GFR AFRICAN AMERICAN: >60
GFR NON-AFRICAN AMERICAN: >60
GLOBULIN: 3.2 G/DL
GLUCOSE BLD-MCNC: 105 MG/DL (ref 70–99)
HCT VFR BLD CALC: 36.3 % (ref 36–48)
HEMOGLOBIN: 11.8 G/DL (ref 12–16)
LYMPHOCYTES ABSOLUTE: 3.3 K/UL (ref 1–5.1)
LYMPHOCYTES RELATIVE PERCENT: 53.7 %
MCH RBC QN AUTO: 28.7 PG (ref 26–34)
MCHC RBC AUTO-ENTMCNC: 32.4 G/DL (ref 31–36)
MCV RBC AUTO: 88.4 FL (ref 80–100)
MONOCYTES ABSOLUTE: 0.4 K/UL (ref 0–1.3)
MONOCYTES RELATIVE PERCENT: 6.5 %
NEUTROPHILS ABSOLUTE: 2.3 K/UL (ref 1.7–7.7)
NEUTROPHILS RELATIVE PERCENT: 37.7 %
PDW BLD-RTO: 15.8 % (ref 12.4–15.4)
PLATELET # BLD: 302 K/UL (ref 135–450)
PMV BLD AUTO: 8.4 FL (ref 5–10.5)
POTASSIUM SERPL-SCNC: 4.3 MMOL/L (ref 3.5–5.1)
RBC # BLD: 4.1 M/UL (ref 4–5.2)
SODIUM BLD-SCNC: 140 MMOL/L (ref 136–145)
TOTAL PROTEIN: 7.6 G/DL (ref 6.4–8.2)
WBC # BLD: 6.1 K/UL (ref 4–11)

## 2019-11-05 PROCEDURE — 20610 DRAIN/INJ JOINT/BURSA W/O US: CPT | Performed by: INTERNAL MEDICINE

## 2019-11-05 PROCEDURE — 99214 OFFICE O/P EST MOD 30 MIN: CPT | Performed by: INTERNAL MEDICINE

## 2019-11-05 RX ORDER — TRIAMCINOLONE ACETONIDE 40 MG/ML
80 INJECTION, SUSPENSION INTRA-ARTICULAR; INTRAMUSCULAR ONCE
Status: COMPLETED | OUTPATIENT
Start: 2019-11-05 | End: 2019-11-05

## 2019-11-05 RX ORDER — LIDOCAINE HYDROCHLORIDE 10 MG/ML
2 INJECTION, SOLUTION INFILTRATION; PERINEURAL ONCE
Status: COMPLETED | OUTPATIENT
Start: 2019-11-05 | End: 2019-11-05

## 2019-11-05 RX ADMIN — LIDOCAINE HYDROCHLORIDE 2 ML: 10 INJECTION, SOLUTION INFILTRATION; PERINEURAL at 09:25

## 2019-11-05 RX ADMIN — TRIAMCINOLONE ACETONIDE 80 MG: 40 INJECTION, SUSPENSION INTRA-ARTICULAR; INTRAMUSCULAR at 09:26

## 2019-11-05 RX ADMIN — LIDOCAINE HYDROCHLORIDE 2 ML: 10 INJECTION, SOLUTION INFILTRATION; PERINEURAL at 09:24

## 2019-11-12 ENCOUNTER — HOSPITAL ENCOUNTER (OUTPATIENT)
Dept: MAMMOGRAPHY | Age: 54
Discharge: HOME | End: 2019-11-17
Payer: COMMERCIAL

## 2019-11-12 DIAGNOSIS — Z12.31 VISIT FOR SCREENING MAMMOGRAM: ICD-10-CM

## 2019-11-12 PROCEDURE — 77063 BREAST TOMOSYNTHESIS BI: CPT

## 2019-12-10 RX ORDER — METHYLPREDNISOLONE 4 MG/1
TABLET ORAL
Qty: 1 KIT | Refills: 0 | Status: SHIPPED | OUTPATIENT
Start: 2019-12-10 | End: 2019-12-16

## 2019-12-12 ENCOUNTER — OFFICE VISIT (OUTPATIENT)
Dept: RHEUMATOLOGY | Age: 54
End: 2019-12-12
Payer: COMMERCIAL

## 2019-12-12 VITALS
SYSTOLIC BLOOD PRESSURE: 120 MMHG | DIASTOLIC BLOOD PRESSURE: 78 MMHG | HEIGHT: 62 IN | WEIGHT: 205 LBS | BODY MASS INDEX: 37.73 KG/M2

## 2019-12-12 DIAGNOSIS — M06.09 RHEUMATOID ARTHRITIS OF MULTIPLE SITES WITH NEGATIVE RHEUMATOID FACTOR (HCC): ICD-10-CM

## 2019-12-12 DIAGNOSIS — Z51.11 MAINTENANCE CHEMOTHERAPY: ICD-10-CM

## 2019-12-12 DIAGNOSIS — M25.561 ACUTE PAIN OF RIGHT KNEE: Primary | ICD-10-CM

## 2019-12-12 PROCEDURE — 99214 OFFICE O/P EST MOD 30 MIN: CPT | Performed by: INTERNAL MEDICINE

## 2020-01-14 ENCOUNTER — OFFICE VISIT (OUTPATIENT)
Dept: FAMILY MEDICINE CLINIC | Age: 55
End: 2020-01-14
Payer: COMMERCIAL

## 2020-01-14 VITALS
WEIGHT: 200.4 LBS | HEART RATE: 78 BPM | BODY MASS INDEX: 36.64 KG/M2 | SYSTOLIC BLOOD PRESSURE: 122 MMHG | DIASTOLIC BLOOD PRESSURE: 80 MMHG | OXYGEN SATURATION: 99 % | TEMPERATURE: 98.1 F

## 2020-01-14 PROCEDURE — 99213 OFFICE O/P EST LOW 20 MIN: CPT | Performed by: PHYSICIAN ASSISTANT

## 2020-01-14 RX ORDER — AMOXICILLIN AND CLAVULANATE POTASSIUM 875; 125 MG/1; MG/1
1 TABLET, FILM COATED ORAL 2 TIMES DAILY
Qty: 20 TABLET | Refills: 0 | Status: SHIPPED | OUTPATIENT
Start: 2020-01-14 | End: 2020-01-24

## 2020-01-14 ASSESSMENT — ENCOUNTER SYMPTOMS
WHEEZING: 0
EYE PAIN: 0
SORE THROAT: 1
SHORTNESS OF BREATH: 0
COUGH: 1

## 2020-01-14 ASSESSMENT — PATIENT HEALTH QUESTIONNAIRE - PHQ9
1. LITTLE INTEREST OR PLEASURE IN DOING THINGS: 0
SUM OF ALL RESPONSES TO PHQ QUESTIONS 1-9: 0
SUM OF ALL RESPONSES TO PHQ9 QUESTIONS 1 & 2: 0
2. FEELING DOWN, DEPRESSED OR HOPELESS: 0
SUM OF ALL RESPONSES TO PHQ QUESTIONS 1-9: 0

## 2020-01-14 NOTE — PROGRESS NOTES
Subjective:      Patient ID: Javier Diez is a 49 y.o. female. HPI  Patient is here today for a 3 week history of productive cough, congestion sneezing, headaches, sore throat, and fatigue. States her sputum is \"yellowish/green\" in color coming from her nose and symptoms are worse at night. Also states she has been sweating a lot, but has not taken her temperature so is unsure if she has been running a fever at home. Denies SOB, wheezing and chest pain. She has not taken anything otc for the symptoms. Review of Systems   Constitutional: Positive for diaphoresis and fatigue. Negative for fever. HENT: Positive for congestion, sneezing and sore throat. Negative for ear pain. Eyes: Negative for pain. Respiratory: Positive for cough. Negative for shortness of breath and wheezing. Cardiovascular: Negative for chest pain. Skin: Negative for rash. Neurological: Positive for headaches. Objective:   Physical Exam  Vitals signs reviewed. Constitutional:       General: She is not in acute distress. Appearance: She is well-developed and well-groomed. HENT:      Head: Normocephalic. Right Ear: Tympanic membrane normal.      Left Ear: Tympanic membrane normal.      Nose: Mucosal edema, congestion and rhinorrhea present. Right Sinus: No maxillary sinus tenderness or frontal sinus tenderness. Left Sinus: No maxillary sinus tenderness or frontal sinus tenderness. Mouth/Throat:      Mouth: Mucous membranes are moist.   Eyes:      Conjunctiva/sclera: Conjunctivae normal.   Neck:      Musculoskeletal: Neck supple. Cardiovascular:      Rate and Rhythm: Normal rate and regular rhythm. Pulmonary:      Effort: Pulmonary effort is normal. No respiratory distress. Breath sounds: Normal breath sounds. Lymphadenopathy:      Cervical: No cervical adenopathy. Skin:     General: Skin is warm and dry.    Neurological:      Mental Status: She is alert and oriented to person,

## 2020-02-24 ENCOUNTER — OFFICE VISIT (OUTPATIENT)
Dept: RHEUMATOLOGY | Age: 55
End: 2020-02-24
Payer: COMMERCIAL

## 2020-02-24 VITALS
DIASTOLIC BLOOD PRESSURE: 85 MMHG | WEIGHT: 206 LBS | SYSTOLIC BLOOD PRESSURE: 132 MMHG | HEART RATE: 76 BPM | BODY MASS INDEX: 37.67 KG/M2

## 2020-02-24 DIAGNOSIS — Z51.11 MAINTENANCE CHEMOTHERAPY: ICD-10-CM

## 2020-02-24 LAB
ALT SERPL-CCNC: 11 U/L (ref 10–40)
AST SERPL-CCNC: 14 U/L (ref 15–37)
BASOPHILS ABSOLUTE: 0 K/UL (ref 0–0.2)
BASOPHILS RELATIVE PERCENT: 0.6 %
C-REACTIVE PROTEIN: 18.6 MG/L (ref 0–5.1)
CREAT SERPL-MCNC: 0.6 MG/DL (ref 0.6–1.1)
EOSINOPHILS ABSOLUTE: 0.1 K/UL (ref 0–0.6)
EOSINOPHILS RELATIVE PERCENT: 1.4 %
GFR AFRICAN AMERICAN: >60
GFR NON-AFRICAN AMERICAN: >60
HCT VFR BLD CALC: 36.1 % (ref 36–48)
HEMOGLOBIN: 11.7 G/DL (ref 12–16)
LYMPHOCYTES ABSOLUTE: 3.6 K/UL (ref 1–5.1)
LYMPHOCYTES RELATIVE PERCENT: 58.8 %
MCH RBC QN AUTO: 28.9 PG (ref 26–34)
MCHC RBC AUTO-ENTMCNC: 32.3 G/DL (ref 31–36)
MCV RBC AUTO: 89.5 FL (ref 80–100)
MONOCYTES ABSOLUTE: 0.3 K/UL (ref 0–1.3)
MONOCYTES RELATIVE PERCENT: 5.7 %
NEUTROPHILS ABSOLUTE: 2.1 K/UL (ref 1.7–7.7)
NEUTROPHILS RELATIVE PERCENT: 33.5 %
PDW BLD-RTO: 15.2 % (ref 12.4–15.4)
PLATELET # BLD: 275 K/UL (ref 135–450)
PMV BLD AUTO: 9.1 FL (ref 5–10.5)
RBC # BLD: 4.03 M/UL (ref 4–5.2)
SEDIMENTATION RATE, ERYTHROCYTE: 43 MM/HR (ref 0–30)
WBC # BLD: 6.2 K/UL (ref 4–11)

## 2020-02-24 PROCEDURE — 20610 DRAIN/INJ JOINT/BURSA W/O US: CPT | Performed by: INTERNAL MEDICINE

## 2020-02-24 PROCEDURE — 99214 OFFICE O/P EST MOD 30 MIN: CPT | Performed by: INTERNAL MEDICINE

## 2020-02-24 RX ORDER — TRIAMCINOLONE ACETONIDE 40 MG/ML
80 INJECTION, SUSPENSION INTRA-ARTICULAR; INTRAMUSCULAR ONCE
Status: COMPLETED | OUTPATIENT
Start: 2020-02-24 | End: 2020-02-24

## 2020-02-24 RX ORDER — LIDOCAINE HYDROCHLORIDE 10 MG/ML
2 INJECTION, SOLUTION INFILTRATION; PERINEURAL ONCE
Status: COMPLETED | OUTPATIENT
Start: 2020-02-24 | End: 2020-02-24

## 2020-02-24 RX ADMIN — LIDOCAINE HYDROCHLORIDE 2 ML: 10 INJECTION, SOLUTION INFILTRATION; PERINEURAL at 11:16

## 2020-02-24 RX ADMIN — TRIAMCINOLONE ACETONIDE 80 MG: 40 INJECTION, SUSPENSION INTRA-ARTICULAR; INTRAMUSCULAR at 11:15

## 2020-02-24 RX ADMIN — LIDOCAINE HYDROCHLORIDE 2 ML: 10 INJECTION, SOLUTION INFILTRATION; PERINEURAL at 11:17

## 2020-02-24 NOTE — PROGRESS NOTES
days.  She received corticosteroid injection in both knees 3 months ago which helps significantly until one or 2 weeks ago. She is now complaining of pain in the knees which is worse going up and down the steps and at night. She denies any swelling. She denies any other joint pain. She denies any morning stiffness. He denies any side effects with Enbrel. She denies any recent fevers or infections. She is off of methotrexate. She did not tolerate sulfasalazine [developed nausea, abdominal pain and diarrhea]. She did not tolerate Arava either [hair loss]. X-rays of the hands consistent with erosive osteoarthritis. Knee x-rays mild degenerative changes. Blood work shows low titer positive CCP, elevated ESR and CRP. HPI  ROS    REVIEW OF SYSTEMS:   Constitutional: No unanticipated weight loss or fevers. No fatigue and malaise. Integumentary: No photosensitivity, malar rash, livedo reticularis, alopecia and Raynaud's symptoms, sclerodactyly, skin tightening  Eyes: negative for dry eyes, visual disturbance and persistent redness, discharge from eyes   ENT: - No tinnitus, loss of hearing, vertigo, or recurrent ear infections.  - No history of nasal/oral ulcers. - No history of sicca symptoms. Cardiovascular: No history of pericarditis, chest pain or murmur or palpitations  Respiratory: No shortness of breath, cough or history of interstitial lung disease. No history of pleurisy. No history of tuberculosis or atypical infections. Gastrointestinal: No history of dysphagia or esophageal dysmotility. No change in bowel habits or any inflammatory bowel disease. Genitourinary: No history renal disease, miscarriages. Hematologic/Lymphatic: No  bleeding, blood clots or swollen lymph nodes. Neurological: No history seizure or focal weakness.  No history of neuropathies, paresthesias or hyperesthesias, facial droop, diplopia  Psychiatric: No history of bipolar disease, anxiety, depression  Endocrine: Denies any thyroid / parathyroid disease and osteoporosis  Allergic/Immunologic: No nasal congestion         I have reviewed patients Past medical History, Social History and Family History as mentioned in her chart and this remains unchanged from previous.     Past Medical History:   Diagnosis Date    Anemia     Fibroids     Rheumatoid arthritis ([facility] [address]      Past Surgical History:   Procedure Laterality Date    HYSTERECTOMY  10/2013    [facility], lso    TOOTH EXTRACTION  04/2018     Social History     Socioeconomic History    Marital status: Single     Spouse name: Not on file    Number of children: Not on file    Years of education: Not on file    Highest education level: Not on file   Occupational History    Not on file   Social Needs    Financial resource strain: Not on file    Food insecurity:     Worry: Not on file     Inability: Not on file    Transportation needs:     Medical: Not on file     Non-medical: Not on file   Tobacco Use    Smoking status: Never Smoker    Smokeless tobacco: Never Used   Substance and Sexual Activity    Alcohol use: Yes     Comment: RARELY    Drug use: No    Sexual activity: Yes     Partners: Male   Lifestyle    Physical activity:     Days per week: Not on file     Minutes per session: Not on file    Stress: Not on file   Relationships    Social connections:     Talks on phone: Not on file     Gets together: Not on file     Attends Anabaptist service: Not on file     Active member of club or organization: Not on file     Attends meetings of clubs or organizations: Not on file     Relationship status: Not on file    Intimate partner violence:     Fear of current or ex partner: Not on file     Emotionally abused: Not on file     Physically abused: Not on file     Forced sexual activity: Not on file   Other Topics Concern    Not on file   Social History Narrative    Not on file     Family History   Problem Relation Age of Onset    Diabetes Mother     Hypertension Mother    Ewelina Mcdonough Heart Failure Mother     Heart Attack Mother     Diabetes Father     Cancer Sister         colon    Rheum Arthritis Neg Hx     Osteoarthritis Neg Hx     Asthma Neg Hx     Breast Cancer Neg Hx     High Cholesterol Neg Hx     Migraines Neg Hx     Ovarian Cancer Neg Hx     Rashes/Skin Problems Neg Hx     Seizures Neg Hx     Stroke Neg Hx     Thyroid Disease Neg Hx          PHYSICAL EXAM   Vitals:    02/24/20 1026   BP: 132/85   Site: Left Upper Arm   Position: Sitting   Cuff Size: Large Adult   Pulse: 76   Weight: 206 lb (93.4 kg)     Physical Exam  Constitutional:  Well developed, well nourished, no acute distress, non-toxic appearance   Musculoskeletal:    RIGHT  Swell  Tender  ROM  LEFT  Swell  Tender  ROM    DIP2  0  0  [place]   0  0  [place]berden    DIP3  0  0  [place]   0  0  [place]    DIP4  0  0  [place]   0  0  [place]berden    DIP5  0  0  [place]berden   0  0  Heberden    PIP1  + 0 Bony change   + 0 Bony change    PIP2  + 0 Bony change   + 0 Bony change    PIP3  + 0 Bony change   + 0 Bony change    PIP4  + 0 Bony change   ++ 0 Bony change    PIP5  + 0 Bony change   0 0 Bony change    MCP1  0  0  FULL   0  0  FULL    MCP2  0  0  FULL   0  0  FULL    MCP3  0  0  FULL   0  0  FULL    MCP4  0  0  FULL   0  0  FULL    MCP5  0  0  FULL   0  0  FULL    Wrist  0 0 FULL   0  0  FULL    Elbow  0  0  FULL   0  0  FULL    Shouldr  0  0  FULL   0  0  FULL    Hip  0  0  FULL   0  + FULL    Knee  0 + Crepitus   0 + Crepitus    Ankle  0  0  FULL   0  0  FULL    MTP1  0  0  FULL   0  0  FULL    MTP2  0  0  FULL   0  0  FULL    MTP3  0  0  FULL   0  0  FULL    MTP4  0  0  FULL   0  0  FULL    MTP5  0  0  FULL   0  0  FULL    IP1  0  0  FULL   0  0  FULL    IP2  0  0  FULL   0  0  FULL    IP3  0  0  FULL   0  0  FULL    IP4  0  0  FULL   0  0  FULL    IP5  0  0  FULL   0 0 FULL   Tenderness to palpation in bilateral knees along medial and lateral joint line, full range of motion  Ambulates without assistance, normal a few more days before resuming regular activities. Advised patient to watch for fever, increased swelling or persistent pain in the joint. Call or return to clinic prn if such symptoms occur or there is failure to improve as anticipated. The patient indicates understanding of these issues and agrees with the plan. Return in about 3 months (around 5/24/2020). The risks and benefits of my recommendations, as well as other treatment options, benefits and side effects were discussed with the patient. All questions were answered. ######################################################################    I thank you for giving me the opportunity to participate in [facility]. If you have any questions or concerns please feel free to contact me. I look forward to following  Mendoza Ha along with you. Electronically signed by: Chan Hannon MD, 2/24/2020 11:04 AM    Documentation was done using voice recognition dragon software. Every effort was made to ensure accuracy; however, inadvertent unintentional computerized transcription errors may be present.

## 2020-03-04 ENCOUNTER — HOSPITAL ENCOUNTER (EMERGENCY)
Age: 55
Discharge: HOME | End: 2020-03-04
Attending: EMERGENCY MEDICINE
Payer: COMMERCIAL

## 2020-03-04 ENCOUNTER — TELEPHONE (OUTPATIENT)
Dept: INTERNAL MEDICINE CLINIC | Age: 55
End: 2020-03-04

## 2020-03-04 ENCOUNTER — APPOINTMENT (OUTPATIENT)
Dept: GENERAL RADIOLOGY | Age: 55
End: 2020-03-04
Payer: COMMERCIAL

## 2020-03-04 VITALS
TEMPERATURE: 97.9 F | WEIGHT: 205 LBS | BODY MASS INDEX: 37.73 KG/M2 | HEIGHT: 62 IN | SYSTOLIC BLOOD PRESSURE: 132 MMHG | OXYGEN SATURATION: 99 % | DIASTOLIC BLOOD PRESSURE: 71 MMHG | HEART RATE: 65 BPM | RESPIRATION RATE: 17 BRPM

## 2020-03-04 LAB
A/G RATIO: 1.1 (ref 1.1–2.2)
ALBUMIN SERPL-MCNC: 3.7 G/DL (ref 3.4–5)
ALP BLD-CCNC: 111 U/L (ref 40–129)
ALT SERPL-CCNC: 17 U/L (ref 10–40)
ANION GAP SERPL CALCULATED.3IONS-SCNC: 10 MMOL/L (ref 3–16)
AST SERPL-CCNC: 18 U/L (ref 15–37)
BASOPHILS ABSOLUTE: 0 K/UL (ref 0–0.2)
BASOPHILS RELATIVE PERCENT: 0.5 %
BILIRUB SERPL-MCNC: <0.2 MG/DL (ref 0–1)
BUN BLDV-MCNC: 16 MG/DL (ref 7–20)
CALCIUM SERPL-MCNC: 9.2 MG/DL (ref 8.3–10.6)
CHLORIDE BLD-SCNC: 104 MMOL/L (ref 99–110)
CO2: 26 MMOL/L (ref 21–32)
CREAT SERPL-MCNC: 0.6 MG/DL (ref 0.6–1.1)
EKG ATRIAL RATE: 61 BPM
EKG ATRIAL RATE: 79 BPM
EKG DIAGNOSIS: NORMAL
EKG DIAGNOSIS: NORMAL
EKG P AXIS: 39 DEGREES
EKG P AXIS: 55 DEGREES
EKG P-R INTERVAL: 162 MS
EKG P-R INTERVAL: 180 MS
EKG Q-T INTERVAL: 410 MS
EKG Q-T INTERVAL: 446 MS
EKG QRS DURATION: 84 MS
EKG QRS DURATION: 86 MS
EKG QTC CALCULATION (BAZETT): 448 MS
EKG QTC CALCULATION (BAZETT): 470 MS
EKG R AXIS: 13 DEGREES
EKG R AXIS: 13 DEGREES
EKG T AXIS: 30 DEGREES
EKG T AXIS: 8 DEGREES
EKG VENTRICULAR RATE: 61 BPM
EKG VENTRICULAR RATE: 79 BPM
EOSINOPHILS ABSOLUTE: 0.1 K/UL (ref 0–0.6)
EOSINOPHILS RELATIVE PERCENT: 0.8 %
GFR AFRICAN AMERICAN: >60
GFR NON-AFRICAN AMERICAN: >60
GLOBULIN: 3.3 G/DL
GLUCOSE BLD-MCNC: 119 MG/DL (ref 70–99)
HCT VFR BLD CALC: 33.6 % (ref 36–48)
HEMOGLOBIN: 11.1 G/DL (ref 12–16)
LIPASE: 78 U/L (ref 13–60)
LYMPHOCYTES ABSOLUTE: 4.6 K/UL (ref 1–5.1)
LYMPHOCYTES RELATIVE PERCENT: 49.7 %
MAGNESIUM: 2.1 MG/DL (ref 1.8–2.4)
MCH RBC QN AUTO: 29.3 PG (ref 26–34)
MCHC RBC AUTO-ENTMCNC: 33.2 G/DL (ref 31–36)
MCV RBC AUTO: 88.2 FL (ref 80–100)
MONOCYTES ABSOLUTE: 0.6 K/UL (ref 0–1.3)
MONOCYTES RELATIVE PERCENT: 6.9 %
NEUTROPHILS ABSOLUTE: 3.9 K/UL (ref 1.7–7.7)
NEUTROPHILS RELATIVE PERCENT: 42.1 %
PDW BLD-RTO: 14.6 % (ref 12.4–15.4)
PLATELET # BLD: 272 K/UL (ref 135–450)
PMV BLD AUTO: 8 FL (ref 5–10.5)
POTASSIUM REFLEX MAGNESIUM: 3.5 MMOL/L (ref 3.5–5.1)
RBC # BLD: 3.81 M/UL (ref 4–5.2)
SODIUM BLD-SCNC: 140 MMOL/L (ref 136–145)
TOTAL PROTEIN: 7 G/DL (ref 6.4–8.2)
TROPONIN: <0.01 NG/ML
TROPONIN: <0.01 NG/ML
WBC # BLD: 9.2 K/UL (ref 4–11)

## 2020-03-04 PROCEDURE — 83690 ASSAY OF LIPASE: CPT

## 2020-03-04 PROCEDURE — 84484 ASSAY OF TROPONIN QUANT: CPT

## 2020-03-04 PROCEDURE — 6370000000 HC RX 637 (ALT 250 FOR IP): Performed by: EMERGENCY MEDICINE

## 2020-03-04 PROCEDURE — 93010 ELECTROCARDIOGRAM REPORT: CPT | Performed by: INTERNAL MEDICINE

## 2020-03-04 PROCEDURE — 83735 ASSAY OF MAGNESIUM: CPT

## 2020-03-04 PROCEDURE — 99285 EMERGENCY DEPT VISIT HI MDM: CPT

## 2020-03-04 PROCEDURE — 85025 COMPLETE CBC W/AUTO DIFF WBC: CPT

## 2020-03-04 PROCEDURE — 71046 X-RAY EXAM CHEST 2 VIEWS: CPT

## 2020-03-04 PROCEDURE — 93005 ELECTROCARDIOGRAM TRACING: CPT | Performed by: EMERGENCY MEDICINE

## 2020-03-04 PROCEDURE — 80053 COMPREHEN METABOLIC PANEL: CPT

## 2020-03-04 RX ORDER — PREDNISONE 1 MG/1
TABLET ORAL
Qty: 40 TABLET | Refills: 0 | Status: SHIPPED | OUTPATIENT
Start: 2020-03-04 | End: 2020-03-26

## 2020-03-04 RX ORDER — FAMOTIDINE 20 MG/1
20 TABLET, FILM COATED ORAL ONCE
Status: COMPLETED | OUTPATIENT
Start: 2020-03-04 | End: 2020-03-04

## 2020-03-04 RX ADMIN — FAMOTIDINE 20 MG: 20 TABLET, FILM COATED ORAL at 06:28

## 2020-03-04 ASSESSMENT — PAIN SCALES - GENERAL: PAINLEVEL_OUTOF10: 5

## 2020-03-04 ASSESSMENT — HEART SCORE: ECG: 0

## 2020-03-04 ASSESSMENT — PAIN DESCRIPTION - PAIN TYPE: TYPE: ACUTE PAIN

## 2020-03-04 NOTE — ED PROVIDER NOTES
significantly different    During the patient's ED course, the patient was given:  Medications   famotidine (PEPCID) tablet 20 mg (20 mg Oral Given 3/4/20 7139)        CLINICAL IMPRESSION  1. Chest pain, unspecified type        Blood pressure 132/71, pulse 65, temperature 97.9 °F (36.6 °C), resp. rate 17, height 5' 2\" (1.575 m), weight 205 lb (93 kg), last menstrual period 09/09/2013, SpO2 99 %, not currently breastfeeding. Bear Patel was discharged to home in stable condition. I have discussed the findings of today's workup with the patient and addressed the patient's questions and concerns. Important warning signs as well as new or worsening symptoms which would necessitate immediate return to the ED were discussed. The plan is to discharge from the ED at this time, and the patient is in stable condition. The patient acknowledged understanding is agreeable with this plan. This chart was created using Dragon dictation software. Efforts were made by me to ensure accuracy, however some errors may be present due to limitations of this technology.         Guera Ordonez MD  03/04/20 2534

## 2020-03-04 NOTE — ED PROVIDER NOTES
[address] [facility]  eMERGENCY dEPARTMENTeNCOUnter      Pt Name: Apoorva Bansal  MRN: [id number]  [place] 1965  Date of evaluation: 3/4/2020  Provider: Tanner Hampton MD    CHIEF COMPLAINT       Chief Complaint   Patient presents with    Chest Pain     Pt from home, states she started with shoulder pain and her left arm went numb and she began with chest tightness after. Denies cardiac HX. HISTORY OF PRESENT ILLNESS   (Location/Symptom, Timing/Onset,Context/Setting, Quality, Duration, Modifying Factors, Severity)  Note limiting factors. Apoorva Bansal is a 48 y.o. female who presents to the emergency department for chest tightness. Which woke up this morning. Associated of tingling down her left arm. Her symptoms have now subsided. No nausea lightheadedness. She did have palpitations. She states that over the past few weeks she has been struggling with GERD symptoms. She is requesting something for this. No abdominal pain no diarrhea. No urinary complaints. Nursing notes were reviewed. REVIEW OF SYSTEMS    (2-9 systems for level 4, 10 or more for level 5)     Review of Systems    Positive and pertinent negative as per HPI. Except as noted above in the ROS, all other systems were reviewed and were negative.     PAST MEDICAL HISTORY     Past Medical History:   Diagnosis Date    Anemia     Fibroids     Rheumatoid arthritis ([facility] [address]          SURGICALHISTORY       Past Surgical History:   Procedure Laterality Date    HYSTERECTOMY  10/2013    [facility], lso    TOOTH EXTRACTION  04/2018         CURRENT MEDICATIONS       Discharge Medication List as of 3/4/2020  9:25 AM      CONTINUE these medications which have NOT CHANGED    Details   meloxicam (MOBIC) 15 MG tablet TK 1 T PO QD, R-5Historical Med      Etanercept (ENBREL MINI) 50 MG/ML SOCT Inject 50 mg into the skin once a week, Disp-4 Cartridge, R-5Normal             ALLERGIES     Patient has no known allergies. FAMILY HISTORY       Family History   Problem Relation Age of Onset    Diabetes Mother     Hypertension Mother     Heart Failure Mother     Heart Attack Mother     Diabetes Father     Cancer Sister         colon    Rheum Arthritis Neg Hx     Osteoarthritis Neg Hx     Asthma Neg Hx     Breast Cancer Neg Hx     High Cholesterol Neg Hx     Migraines Neg Hx     Ovarian Cancer Neg Hx     Rashes/Skin Problems Neg Hx     Seizures Neg Hx     Stroke Neg Hx     Thyroid Disease Neg Hx           SOCIAL HISTORY       Social History     Socioeconomic History    Marital status: Single     Spouse name: None    Number of children: None    Years of education: None    Highest education level: None   Occupational History    None   Social Needs    Financial resource strain: None    Food insecurity:     Worry: None     Inability: None    Transportation needs:     Medical: None     Non-medical: None   Tobacco Use    Smoking status: Never Smoker    Smokeless tobacco: Never Used   Substance and Sexual Activity    Alcohol use: Yes     Comment: RARELY    Drug use: No    Sexual activity: Yes     Partners: Male   Lifestyle    Physical activity:     Days per week: None     Minutes per session: None    Stress: None   Relationships    Social connections:     Talks on phone: None     Gets together: None     Attends [employer] service: None     Active member of club or organization: None     Attends meetings of clubs or organizations: None     Relationship status: None    Intimate partner violence:     Fear of current or ex partner: None     Emotionally abused: None     Physically abused: None     Forced sexual activity: None   Other Topics Concern    None   Social History Narrative    None       SCREENINGS      Heart Score for chest pain patients  History:  Moderately Suspicious  ECG: Normal  Patient Age: > 45 and < 65 years  Risk Factors: 1 or 2 risk factors  Troponin: < 1X normal limit  Heart Score dictation. EMERGENCY DEPARTMENT COURSE and DIFFERENTIAL DIAGNOSIS/MDM:   Vitals:    Vitals:    03/04/20 0800 03/04/20 0830 03/04/20 0845 03/04/20 0900   BP: (!) 140/76 113/67  132/71   Pulse: 52 63 67 65   Resp: 14 16 16 17   Temp:       SpO2: 98% 100% 99% 99%   Weight:       Height:           Adult female with chest pain. Low risk for cardiac disease. Low suspicion for pulmonary embolism, pneumonia pneumothorax or aortic dissection. Initial diagnostic work-up included chest x-ray, EKG and labs. In a show work-up is negative. She is given famotidine as she does report a history of GERD. Because the patient is low risk I believe repeating an EKG and troponin is reasonable. I discussed this with her. I do recommend follow-up with cardiology and a stress test within the next 2 to 3 days. I have signed out Brandon Diaz's Emergency Department care to Dr. Preethi Olivas. We discussed the pertinent history, physical exam, completed/pending test results (if applicable) and current treatment plan. Please refer to his/her chart for the patients remaining Emergency Department course and final disposition. CRITICAL CARE TIME   None       CONSULTS:  None    PROCEDURES:  Unless otherwise noted above, none     Procedures    FINAL IMPRESSION      1.  Chest pain, unspecified type          DISPOSITION/PLAN   DISPOSITION Decision To Discharge 03/04/2020 06:09:13 AM      PATIENT REFERREDTO:  Lo Jeff, [address] [facility] [address] [place] [address]  [phone]    Schedule an appointment as soon as possible for a visit in 3 days  Follow up within 3 days, Return to ED sooner if symptoms worsen    [facility] Emergency Department  [address]  Go to   If symptoms worsen      DISCHARGEMEDICATIONS:  Discharge Medication List as of 3/4/2020  9:25 AM             (Please note that portions of this note were completed with a voice recognition program.  Efforts were made to edit the dictations but occasionally words are mis-transcribed.)    Roger Toledo MD (electronically signed)  Attending Emergency Physician        Roger Toledo MD  03/04/20 1250

## 2020-03-04 NOTE — ED NOTES
Patient discharged to home in stable condition with family via private car. Discharge instructions reviewed with patient and family members. Patient and family verbalized understanding. All belongings in tow including discharge paperwork.                    Darling Chang RN  03/04/20 2814

## 2020-03-04 NOTE — ED NOTES
Pt medicated per eMAR. Verbalizes understanding of repeat lab at 8am. VSS at this time. Continues on cardiac monitor.       Orly Luevano RN  24/87/70 [id number]

## 2020-03-25 ENCOUNTER — TELEPHONE (OUTPATIENT)
Dept: RHEUMATOLOGY | Age: 55
End: 2020-03-25

## 2020-03-26 ENCOUNTER — VIRTUAL VISIT (OUTPATIENT)
Dept: RHEUMATOLOGY | Age: 55
End: 2020-03-26
Payer: COMMERCIAL

## 2020-03-26 PROCEDURE — 99442 PR PHYS/QHP TELEPHONE EVALUATION 11-20 MIN: CPT | Performed by: INTERNAL MEDICINE

## 2020-03-26 RX ORDER — PREDNISONE 1 MG/1
TABLET ORAL
Qty: 60 TABLET | Refills: 0 | Status: SHIPPED | OUTPATIENT
Start: 2020-03-26 | End: 2020-05-26

## 2020-03-26 NOTE — TELEPHONE ENCOUNTER
Pt calling back because she had not heard anything from Dr Montanez about her msg from yesterday.     [place]ell on file on [facility]

## 2020-03-26 NOTE — PROGRESS NOTES
Juan Carlos Garcia is a 48 y.o. female evaluated via telephone on 3/26/2020. Consent:  She and/or health care decision maker is aware that that she may receive a bill for this telephone service, depending on her insurance coverage, and has provided verbal consent to proceed: Yes      Documentation:  I communicated with the patient and/or health care decision maker about knee pain. I affirm this is a Patient Initiated Episode with an Established Patient who has not had a related appointment within my department in the past 7 days or scheduled within the next 24 hours. Total Time: minutes: 11-20 minutes    Note: not billable if this call serves to triage the patient into an appointment for the relevant concern      Dinorah Hardin     Patient consented to the phone visit. Spoke with the patient. She is complaining of acute pain in the right knee. She had corticosteroid injection in the right knee 4 weeks ago which lasted only for 2 weeks. She has osteoarthritis and rheumatoid arthritis. She denies any trauma. She was given prednisone taper which helped but her symptoms returned off of prednisone. She denies any other joint pain, swellingOr stiffness. She denies any fever, red joint, warmth    Recommendations:  We will do MRI of the right hand to rule out meniscal tear  Refer to orthopedic surgeon  Start prednisone taper  Continue meloxicam and Enbrel

## 2020-03-30 ENCOUNTER — TELEPHONE (OUTPATIENT)
Dept: RHEUMATOLOGY | Age: 55
End: 2020-03-30

## 2020-04-01 RX ORDER — ETANERCEPT 50 MG/ML
50 SOLUTION SUBCUTANEOUS WEEKLY
Qty: 4 CARTRIDGE | Refills: 5 | Status: SHIPPED | OUTPATIENT
Start: 2020-04-01 | End: 2021-04-13 | Stop reason: SDUPTHER

## 2020-04-01 NOTE — TELEPHONE ENCOUNTER
This request for Enbrel Mini 50MG/ML cartridges , has been approved.   Effective 04/10/2020-04/10/2021

## 2020-04-20 ENCOUNTER — TELEPHONE (OUTPATIENT)
Dept: INTERNAL MEDICINE CLINIC | Age: 55
End: 2020-04-20

## 2020-04-20 RX ORDER — MELOXICAM 15 MG/1
TABLET ORAL
Qty: 30 TABLET | Refills: 5 | Status: SHIPPED | OUTPATIENT
Start: 2020-04-20 | End: 2021-04-04 | Stop reason: SDUPTHER

## 2020-04-20 RX ORDER — MELOXICAM 15 MG/1
TABLET ORAL
Qty: 30 TABLET | OUTPATIENT
Start: 2020-04-20

## 2020-05-18 ENCOUNTER — NURSE TRIAGE (OUTPATIENT)
Dept: OTHER | Facility: CLINIC | Age: 55
End: 2020-05-18

## 2020-05-26 ENCOUNTER — OFFICE VISIT (OUTPATIENT)
Dept: RHEUMATOLOGY | Age: 55
End: 2020-05-26
Payer: COMMERCIAL

## 2020-05-26 VITALS
TEMPERATURE: 97.8 F | BODY MASS INDEX: 36.58 KG/M2 | HEART RATE: 84 BPM | WEIGHT: 200 LBS | DIASTOLIC BLOOD PRESSURE: 87 MMHG | SYSTOLIC BLOOD PRESSURE: 153 MMHG

## 2020-05-26 LAB
ALT SERPL-CCNC: 19 U/L (ref 10–40)
ANISOCYTOSIS: ABNORMAL
AST SERPL-CCNC: 23 U/L (ref 15–37)
BASOPHILS ABSOLUTE: 0 K/UL (ref 0–0.2)
BASOPHILS RELATIVE PERCENT: 0 %
CREAT SERPL-MCNC: 0.5 MG/DL (ref 0.6–1.1)
EOSINOPHILS ABSOLUTE: 0.1 K/UL (ref 0–0.6)
EOSINOPHILS RELATIVE PERCENT: 2 %
GFR AFRICAN AMERICAN: >60
GFR NON-AFRICAN AMERICAN: >60
HCT VFR BLD CALC: 36.5 % (ref 36–48)
HEMOGLOBIN: 11.9 G/DL (ref 12–16)
LYMPHOCYTES ABSOLUTE: 3.5 K/UL (ref 1–5.1)
LYMPHOCYTES RELATIVE PERCENT: 64 %
MCH RBC QN AUTO: 29.2 PG (ref 26–34)
MCHC RBC AUTO-ENTMCNC: 32.7 G/DL (ref 31–36)
MCV RBC AUTO: 89.4 FL (ref 80–100)
MONOCYTES ABSOLUTE: 0.3 K/UL (ref 0–1.3)
MONOCYTES RELATIVE PERCENT: 5 %
NEUTROPHILS ABSOLUTE: 1.6 K/UL (ref 1.7–7.7)
NEUTROPHILS RELATIVE PERCENT: 29 %
NUCLEATED RED BLOOD CELLS: 1 /100 WBC
NUCLEATED RED BLOOD CELLS: 1 /100 WBC
PDW BLD-RTO: 16 % (ref 12.4–15.4)
PLATELET # BLD: 253 K/UL (ref 135–450)
PMV BLD AUTO: 8.8 FL (ref 5–10.5)
RBC # BLD: 4.09 M/UL (ref 4–5.2)
WBC # BLD: 5.4 K/UL (ref 4–11)

## 2020-05-26 PROCEDURE — 99214 OFFICE O/P EST MOD 30 MIN: CPT | Performed by: INTERNAL MEDICINE

## 2020-05-26 PROCEDURE — 20610 DRAIN/INJ JOINT/BURSA W/O US: CPT | Performed by: INTERNAL MEDICINE

## 2020-05-26 RX ORDER — TRIAMCINOLONE ACETONIDE 40 MG/ML
80 INJECTION, SUSPENSION INTRA-ARTICULAR; INTRAMUSCULAR ONCE
Status: COMPLETED | OUTPATIENT
Start: 2020-05-26 | End: 2020-05-26

## 2020-05-26 RX ORDER — LIDOCAINE HYDROCHLORIDE 10 MG/ML
2 INJECTION, SOLUTION INFILTRATION; PERINEURAL ONCE
Status: COMPLETED | OUTPATIENT
Start: 2020-05-26 | End: 2020-05-26

## 2020-05-26 RX ADMIN — TRIAMCINOLONE ACETONIDE 80 MG: 40 INJECTION, SUSPENSION INTRA-ARTICULAR; INTRAMUSCULAR at 09:48

## 2020-05-26 RX ADMIN — LIDOCAINE HYDROCHLORIDE 2 ML: 10 INJECTION, SOLUTION INFILTRATION; PERINEURAL at 09:47

## 2020-05-26 NOTE — LETTER
[facility]  [address] [address]  Phone: [phone]  Fax: [phone]    Linden Hebert MD        May 26, 2020     Patient: Anum Livingston   YOB: 1965   Date of Visit: 5/26/2020       To Whom it May Concern:    Bogdan Chavez was seen in my clinic on 5/26/2020. She may return to work with restrictions. She should only be standing 8 - 10 hours during her work shifts. If you have any questions or concerns, please don't hesitate to call.     Sincerely,         Linden Hebert MD

## 2020-05-26 NOTE — PROGRESS NOTES
 Not on file   Social History Narrative    Not on file     Family History   Problem Relation Age of Onset    Diabetes Mother     Hypertension Mother     Heart Failure Mother     Heart Attack Mother     Diabetes Father     Cancer Sister         colon    Rheum Arthritis Neg Hx     Osteoarthritis Neg Hx     Asthma Neg Hx     Breast Cancer Neg Hx     High Cholesterol Neg Hx     Migraines Neg Hx     Ovarian Cancer Neg Hx     Rashes/Skin Problems Neg Hx     Seizures Neg Hx     Stroke Neg Hx     Thyroid Disease Neg Hx          PHYSICAL EXAM   Vitals:    05/26/20 0830   BP: (!) 153/87   Site: Left Upper Arm   Position: Sitting   Cuff Size: Large Adult   Pulse: 84   Temp: 97.8 °F (36.6 °C)   TempSrc: Temporal   Weight: 200 lb (90.7 kg)     Physical Exam  Constitutional:  Well developed, well nourished, no acute distress, non-toxic appearance   Musculoskeletal:    RIGHT  Swell  Tender  ROM  LEFT  Swell  Tender  ROM    DIP2  0  0  Heberden   0  0  Heberden    DIP3  0  0  Heberden   0  0  Heberden    DIP4  0  0  Heberden   0  0  Heberden    DIP5  0  0  Heberden   0  0  Heberden    PIP1  + 0 Bony change   + 0 Bony change    PIP2  + 0 Bony change   + 0 Bony change    PIP3  + 0 Bony change   + 0 Bony change    PIP4  + 0 Bony change   ++ 0 Bony change    PIP5  + 0 Bony change   0 0 Bony change    MCP1  0  0  FULL   0  0  FULL    MCP2  0  0  FULL   0  0  FULL    MCP3  0  0  FULL   0  0  FULL    MCP4  0  0  FULL   0  0  FULL    MCP5  0  0  FULL   0  0  FULL    Wrist  0 0 FULL   0  0  FULL    Elbow  0  0  FULL   0  0  FULL    Shouldr  0  0  FULL   0  0  FULL    Hip  0  0  FULL   0  + FULL    Knee  0 + Crepitus   0 + Crepitus    Ankle  0  0  FULL   0  0  FULL    MTP1  0  0  FULL   0  0  FULL    MTP2  0  0  FULL   0  0  FULL    MTP3  0  0  FULL   0  0  FULL    MTP4  0  0  FULL   0  0  FULL    MTP5  0  0  FULL   0  0  FULL    IP1  0  0  FULL   0  0  FULL    IP2  0  0  FULL   0  0  FULL    IP3  0  0  FULL   0  0  FULL informed about the risk and benefits of the procedure, including the risk of infection, hemorrhage and skin hypopigmentation from the corticosteroids. After informed consent was obtained, using sterile technique, the anterior medial area was prepped and chlorhexidine was used as local anesthetic. The joint was entered and Kenalog 80 mg and 2 ml plain Lidocaine was then injected and the needle withdrawn. The procedure was well tolerated. No immediate complication noticed. The patient is asked to continue to rest the joint for a few more days before resuming regular activities. Advised patient to watch for fever, increased swelling or persistent pain in the joint. Call or return to clinic prn if such symptoms occur or there is failure to improve as anticipated. The patient indicates understanding of these issues and agrees with the plan. Return in about 3 months (around 8/26/2020). The risks and benefits of my recommendations, as well as other treatment options, benefits and side effects were discussed with the patient. All questions were answered. ######################################################################    I thank you for giving me the opportunity to participate [facility]. If you have any questions or concerns please feel free to contact me. I look forward to following  Trang Randhawa along with you. Electronically signed by: Falguni Spring MD, 5/26/2020 9:20 AM    Documentation was done using voice recognition dragon software. Every effort was made to ensure accuracy; however, inadvertent unintentional computerized transcription errors may be present.

## 2020-05-28 ENCOUNTER — TELEPHONE (OUTPATIENT)
Dept: RHEUMATOLOGY | Age: 55
End: 2020-05-28

## 2020-05-28 ENCOUNTER — PATIENT MESSAGE (OUTPATIENT)
Dept: RHEUMATOLOGY | Age: 55
End: 2020-05-28

## 2020-05-28 RX ORDER — PREDNISONE 1 MG/1
TABLET ORAL
Qty: 40 TABLET | Refills: 0 | Status: SHIPPED | OUTPATIENT
Start: 2020-05-28 | End: 2020-08-31

## 2020-05-28 NOTE — TELEPHONE ENCOUNTER
From: Raven Fuller  To: Peter Tanner MD  Sent: 5/28/2020 9:00 AM EDT  Subject: Visit Follow-Up Question    I had got shot in both knees, can you please let the her know that my rote knees start hurting again need something for it.  And also her her that I'm going to call to make my appointment for the MRI, MONDAY   [address] [facility]

## 2020-08-31 ENCOUNTER — OFFICE VISIT (OUTPATIENT)
Dept: RHEUMATOLOGY | Age: 55
End: 2020-08-31
Payer: COMMERCIAL

## 2020-08-31 VITALS
BODY MASS INDEX: 35.78 KG/M2 | TEMPERATURE: 97.2 F | SYSTOLIC BLOOD PRESSURE: 112 MMHG | WEIGHT: 195.6 LBS | DIASTOLIC BLOOD PRESSURE: 72 MMHG | HEART RATE: 71 BPM

## 2020-08-31 LAB
ALT SERPL-CCNC: 16 U/L (ref 10–40)
AST SERPL-CCNC: 20 U/L (ref 15–37)
BASOPHILS ABSOLUTE: 0 K/UL (ref 0–0.2)
BASOPHILS RELATIVE PERCENT: 0.3 %
CREAT SERPL-MCNC: 0.6 MG/DL (ref 0.6–1.1)
EOSINOPHILS ABSOLUTE: 0.1 K/UL (ref 0–0.6)
EOSINOPHILS RELATIVE PERCENT: 1.1 %
GFR AFRICAN AMERICAN: >60
GFR NON-AFRICAN AMERICAN: >60
HCT VFR BLD CALC: 37 % (ref 36–48)
HEMOGLOBIN: 12.2 G/DL (ref 12–16)
LYMPHOCYTES ABSOLUTE: 3.6 K/UL (ref 1–5.1)
LYMPHOCYTES RELATIVE PERCENT: 51.1 %
MCH RBC QN AUTO: 29.2 PG (ref 26–34)
MCHC RBC AUTO-ENTMCNC: 32.8 G/DL (ref 31–36)
MCV RBC AUTO: 88.8 FL (ref 80–100)
MONOCYTES ABSOLUTE: 0.3 K/UL (ref 0–1.3)
MONOCYTES RELATIVE PERCENT: 4.6 %
NEUTROPHILS ABSOLUTE: 3 K/UL (ref 1.7–7.7)
NEUTROPHILS RELATIVE PERCENT: 42.9 %
PDW BLD-RTO: 14.9 % (ref 12.4–15.4)
PLATELET # BLD: 273 K/UL (ref 135–450)
PMV BLD AUTO: 8.6 FL (ref 5–10.5)
RBC # BLD: 4.17 M/UL (ref 4–5.2)
WBC # BLD: 7 K/UL (ref 4–11)

## 2020-08-31 PROCEDURE — 99214 OFFICE O/P EST MOD 30 MIN: CPT | Performed by: INTERNAL MEDICINE

## 2020-08-31 PROCEDURE — 20610 DRAIN/INJ JOINT/BURSA W/O US: CPT | Performed by: INTERNAL MEDICINE

## 2020-08-31 RX ORDER — LIDOCAINE HYDROCHLORIDE 20 MG/ML
2 INJECTION, SOLUTION INFILTRATION; PERINEURAL ONCE
Status: COMPLETED | OUTPATIENT
Start: 2020-08-31 | End: 2020-08-31

## 2020-08-31 RX ORDER — TRIAMCINOLONE ACETONIDE 40 MG/ML
40 INJECTION, SUSPENSION INTRA-ARTICULAR; INTRAMUSCULAR ONCE
Status: COMPLETED | OUTPATIENT
Start: 2020-08-31 | End: 2020-08-31

## 2020-08-31 RX ADMIN — LIDOCAINE HYDROCHLORIDE 2 ML: 20 INJECTION, SOLUTION INFILTRATION; PERINEURAL at 09:28

## 2020-08-31 RX ADMIN — LIDOCAINE HYDROCHLORIDE 2 ML: 20 INJECTION, SOLUTION INFILTRATION; PERINEURAL at 09:27

## 2020-08-31 RX ADMIN — TRIAMCINOLONE ACETONIDE 40 MG: 40 INJECTION, SUSPENSION INTRA-ARTICULAR; INTRAMUSCULAR at 09:30

## 2020-08-31 RX ADMIN — TRIAMCINOLONE ACETONIDE 40 MG: 40 INJECTION, SUSPENSION INTRA-ARTICULAR; INTRAMUSCULAR at 09:29

## 2020-08-31 NOTE — PROGRESS NOTES
2020  Patient Name: Lannie Snellen  : 1965  Medical Record: [id number]    MEDICATIONS  Current Outpatient Medications   Medication Sig Dispense Refill    meloxicam (MOBIC) 15 MG tablet TK 1 T PO QD 30 tablet 5    Etanercept (ENBREL MINI) 50 MG/ML SOCT Inject 50 mg into the skin once a week 4 Cartridge 5     No current facility-administered medications for this visit. ALLERGIES  No Known Allergies      Comments  No specialty comments available. Background history:  Lannie Snellen is a 48 y.o. female who is being seen for follow up evaluation of Joint pain. She is complaining of pain in hands, left hip and left knee. Pain in the hands started 2 years ago and is progressively getting worse. She describes her pain as constant, aching, 5-6 out of 10 without any significant relieving or aggravating factors. Pain is located in PIP, MCP, DIP joints. Pain in the left hip is located on the lateral side and gets worse on laying on the left side. Knees crack and pop. Knee pain gets worse going up and down the steps. She has swelling in the knuckles, stiffness throughout the day. She also has difficulty opening doorknobs and jar cans. She is on meloxicam 7.5 mg daily without improvement. She denies any history of psoriasis, inflammatory bowel disease, inflammatory back pain, uveitis, enthesitis, tenosynovitis or dactylitis. Interim history: She presents for follow-up of Osteoarthritis and rheumatoid arthritis. She is on Enbrel 50 mg once a week and meloxicam 15 mg daily. She received corticosteroid injection in both knees 3 months ago which helped significantly. She is now complaining of pain and stiffness in the knees for past few days. She was advised to get MRI of the right knee to rule out meniscal tear but has not obtained it yet. She denies any other joint pain or swelling or stiffness. She denies any morning stiffness. He denies any side effects with Enbrel.   She denies any recent fevers or infections. She is off of methotrexate. She did not tolerate sulfasalazine [developed nausea, abdominal pain and diarrhea]. She did not tolerate Arava either [hair loss]. X-rays of the hands consistent with erosive osteoarthritis. Knee x-rays mild degenerative changes. Blood work shows low titer positive CCP, elevated ESR and CRP. Knee Pain        ROS    REVIEW OF SYSTEMS:   Constitutional: No unanticipated weight loss or fevers. No fatigue and malaise. Integumentary: No photosensitivity, malar rash, livedo reticularis, alopecia and Raynaud's symptoms, sclerodactyly, skin tightening  Eyes: negative for dry eyes, visual disturbance and persistent redness, discharge from eyes   ENT: - No tinnitus, loss of hearing, vertigo, or recurrent ear infections.  - No history of nasal/oral ulcers. - No history of sicca symptoms. Cardiovascular: No history of pericarditis, chest pain or murmur or palpitations  Respiratory: No shortness of breath, cough or history of interstitial lung disease. No history of pleurisy. No history of tuberculosis or atypical infections. Gastrointestinal: No history of dysphagia or esophageal dysmotility. No change in bowel habits or any inflammatory bowel disease. Genitourinary: No history renal disease, miscarriages. Hematologic/Lymphatic: No  bleeding, blood clots or swollen lymph nodes. Neurological: No history seizure or focal weakness. No history of neuropathies, paresthesias or hyperesthesias, facial droop, diplopia  Psychiatric: No history of bipolar disease, anxiety, depression  Endocrine: Denies any thyroid / parathyroid disease and osteoporosis  Allergic/Immunologic: No nasal congestion         I have reviewed patients Past medical History, Social History and Family History as mentioned in her chart and this remains unchanged from previous.     Past Medical History:   Diagnosis Date    Anemia     Fibroids     Rheumatoid arthritis ([address] Past Surgical History:   Procedure Laterality Date    HYSTERECTOMY  10/2013    [facility], lso    TOOTH EXTRACTION  04/2018     Social History     Socioeconomic History    Marital status: Single     Spouse name: Not on file    Number of children: Not on file    Years of education: Not on file    Highest education level: Not on file   Occupational History    Not on file   Social Needs    Financial resource strain: Not on file    Food insecurity     Worry: Not on file     Inability: Not on file    Transportation needs     Medical: Not on file     Non-medical: Not on file   Tobacco Use    Smoking status: Never Smoker    Smokeless tobacco: Never Used   Substance and Sexual Activity    Alcohol use: Yes     Comment: RARELY    Drug use: No    Sexual activity: Yes     Partners: Male   Lifestyle    Physical activity     Days per week: Not on file     Minutes per session: Not on file    Stress: Not on file   Relationships    Social connections     Talks on phone: Not on file     Gets together: Not on file     Attends Quaker service: Not on file     Active member of club or organization: Not on file     Attends meetings of clubs or organizations: Not on file     Relationship status: Not on file    Intimate partner violence     Fear of current or ex partner: Not on file     Emotionally abused: Not on file     Physically abused: Not on file     Forced sexual activity: Not on file   Other Topics Concern    Not on file   Social History Narrative    Not on file     Family History   Problem Relation Age of Onset    Diabetes Mother     Hypertension Mother     Heart Failure Mother     Heart Attack Mother     Diabetes Father     Cancer Sister         colon    Rheum Arthritis Neg Hx     Osteoarthritis Neg Hx     Asthma Neg Hx     Breast Cancer Neg Hx     High Cholesterol Neg Hx     Migraines Neg Hx     Ovarian Cancer Neg Hx     Rashes/Skin Problems Neg Hx     Seizures Neg Hx     Stroke Neg Hx     Thyroid Disease Neg Hx          PHYSICAL EXAM   Vitals:    08/31/20 0821   BP: 112/72   Pulse: 71   Temp: 97.2 °F (36.2 °C)   Weight: 195 lb 9.6 oz (88.7 kg)     Physical Exam  Constitutional:  Well developed, well nourished, no acute distress, non-toxic appearance   Musculoskeletal:    RIGHT  Swell  Tender  ROM  LEFT  Swell  Tender  ROM    DIP2  0  0  [place]   0  0  Heberden    DIP3  0  0  Heberden   0  0  Heberden    DIP4  0  0  Heberden   0  0  Heberden    DIP5  0  0  Heberden   0  0  Heberden    PIP1  + 0 Bony change   + 0 Bony change    PIP2  + 0 Bony change   + 0 Bony change    PIP3  + 0 Bony change   + 0 Bony change    PIP4  + 0 Bony change   ++ 0 Bony change    PIP5  + 0 Bony change   0 0 Bony change    MCP1  0  0  FULL   0  0  FULL    MCP2  0  0  FULL   0  0  FULL    MCP3  0  0  FULL   0  0  FULL    MCP4  0  0  FULL   0  0  FULL    MCP5  0  0  FULL   0  0  FULL    Wrist  0 0 FULL   0  0  FULL    Elbow  0  0  FULL   0  0  FULL    Shouldr  0  0  FULL   0  0  FULL    Hip  0  0  FULL   0  + FULL    Knee  + + Crepitus   0 + Crepitus    Ankle  0  0  FULL   0  0  FULL    MTP1  0  0  FULL   0  0  FULL    MTP2  0  0  FULL   0  0  FULL    MTP3  0  0  FULL   0  0  FULL    MTP4  0  0  FULL   0  0  FULL    MTP5  0  0  FULL   0  0  FULL    IP1  0  0  FULL   0  0  FULL    IP2  0  0  FULL   0  0  FULL    IP3  0  0  FULL   0  0  FULL    IP4  0  0  FULL   0  0  FULL    IP5  0  0  FULL   0 0 FULL   Tenderness to palpation in bilateral knees along medial and lateral joint line, full range of motion  Ambulates without assistance, normal gait  Neck: Full ROM, no tenderness, supple   Back- no tenderness. Eyes:  PERRL, extra ocular movements intact, conjunctiva normal   HEENT:  Atraumatic, normocephalic, external ears normal, oropharynx moist, no pharyngeal exudates.    Respiratory:  No respiratory distress  GI:  Soft, nondistended, normal bowel sounds, nontender, no organomegaly, no mass, no rebound, no guarding   :  No costovertebral angle tenderness   Integument:  Well hydrated, no telangiectasias  Lymphatic:  No lymphadenopathy noted   Neurologic:   Alert & oriented x 3, CN 2-12 normal, no focal deficits noted. Sensations Intact. Muscle strength 5/5 proximally and distally in upper and lower extremities.    Psychiatric:  Speech and behavior appropriate           LABS AND IMAGING  Outside data reviewed and in HPI    Lab Results   Component Value Date    WBC 5.4 05/26/2020    RBC 4.09 05/26/2020    HGB 11.9 05/26/2020    HCT 36.5 05/26/2020     05/26/2020    MCV 89.4 05/26/2020    MCH 29.2 05/26/2020    MCHC 32.7 05/26/2020    RDW 16.0 05/26/2020    NRBC 1 05/26/2020    NRBC 1 05/26/2020    SEGSPCT 34 09/02/2017    LYMPHOPCT 64.0 05/26/2020    MONOPCT 5.0 05/26/2020    BASOPCT 0.0 05/26/2020    MONOSABS 0.3 05/26/2020    LYMPHSABS 3.5 05/26/2020    EOSABS 0.1 05/26/2020    BASOSABS 0.0 05/26/2020       Chemistry        Component Value Date/Time     03/04/2020 0501    K 3.5 03/04/2020 0501     03/04/2020 0501    CO2 26 03/04/2020 0501    BUN 16 03/04/2020 0501    CREATININE 0.5 (L) 05/26/2020 0947        Component Value Date/Time    CALCIUM 9.2 03/04/2020 0501    ALKPHOS 111 03/04/2020 0501    AST 23 05/26/2020 0947    ALT 19 05/26/2020 0947    BILITOT <0.2 03/04/2020 0501          Lab Results   Component Value Date    SEDRATE 43 (H) 02/24/2020     Lab Results   Component Value Date    CRP 18.6 (H) 02/24/2020     No results found for: JADEN, SHANIQUE, SSA, SSB, C3, C4  Lab Results   Component Value Date    RF <10.0 03/16/2018    CCPABIGG 26 03/16/2018     No results found for: JADEN, ANATITER, ANAINT, PATH  No results found for: DSDNAG, DSDNAIGGIFA  Lab Results   Component Value Date    SSALAAB 0 03/16/2018     No results found for: SMAB, RNPAB  No results found for: CENTABIGG  No results found for: C3, C4, ACE  No results found for: JO1, VITD25, RBP96BESTX    ASSESSMENT AND PLAN      Assessment/Plan: ASSESSMENT:    1. Rheumatoid arthritis of multiple sites with negative rheumatoid factor ([address]    2. Maintenance chemotherapy    3. Primary osteoarthritis involving multiple joints        PLAN:   1. Rheumatoid arthritis of multiple sites with negative rheumatoid factor (HCC)  Low titer positive CCP, elevated ESR and CRP and negative RF, hand x-rays with erosive osteoarthritis  -NO active synovitis on joint exam  -Continue Enbrel 50 mG once a week  -Discontinued leflunomide due to hair loss and sulfasalazine due to nausea and diarrhea  -No significant improvement with methotrexate    - DE ARTHROCENTESIS ASPIR&/INJ MAJOR JT/BURSA W/O US    2. Primary osteoarthritis involving multiple joints  Symptomatic in bilateral knees. Last corticosteroid injection was 3 months ago. No significant improvement with meloxicam 15 mg daily  I will do bilateral knee corticosteroid injection  Advised to obtain MRI of the right knee if no improvement  - DE ARTHROCENTESIS ASPIR&/INJ MAJOR JT/BURSA W/O US  - triamcinolone acetonide (KENALOG-40) injection 40 mg  - triamcinolone acetonide (KENALOG-40) injection 40 mg  - lidocaine 1 % injection 2 mL  - lidocaine 1 % injection 2 mL    3. Maintenance chemotherapy  Labs reviewed. - Creatinine, Serum  - CBC Auto Differential  - AST  - ALT      PROCEDURE NOTE    JOINT ASPIRATION/INJECTION  Bilateral knee corticosteroid injection:  The patient was informed about the risk and benefits of the procedure, including the risk of infection, hemorrhage and skin hypopigmentation from the corticosteroids. After informed consent was obtained, using sterile technique, the anterior medial area was prepped and chlorhexidine was used as local anesthetic. The joint was entered and Kenalog 40 mg and 2 ml plain Lidocaine was then injected and the needle withdrawn. The procedure was well tolerated. No immediate complication noticed.   The patient is asked to continue to rest the joint for a few more days before resuming regular activities. Advised patient to watch for fever, increased swelling or persistent pain in the joint. Call or return to clinic prn if such symptoms occur or there is failure to improve as anticipated. The patient indicates understanding of these issues and agrees with the plan. Return in about 3 months (around 11/30/2020). The risks and benefits of my recommendations, as well as other treatment options, benefits and side effects were discussed with the patient. All questions were answered. ######################################################################    I thank you for giving me the opportunity to participate in [facility]. If you have any questions or concerns please feel free to contact me. I look forward to following  Kyle Reddy along with you. Electronically signed by: Ezella Fothergill, MD, 8/31/2020 9:10 AM    Documentation was done using voice recognition dragon software. Every effort was made to ensure accuracy; however, inadvertent unintentional computerized transcription errors may be present.

## 2020-11-19 ENCOUNTER — HOSPITAL ENCOUNTER (OUTPATIENT)
Dept: MRI IMAGING | Age: 55
Discharge: HOME | End: 2020-11-19
Payer: COMMERCIAL

## 2020-11-19 PROCEDURE — 73721 MRI JNT OF LWR EXTRE W/O DYE: CPT

## 2020-11-19 NOTE — RESULT ENCOUNTER NOTE
Please call the patient and let her know that I will refer her to orthopedic surgery for right knee osteoarthritis.   MRI of the right knee showed osteoarthritis without any ligament or meniscal tear

## 2020-11-24 ENCOUNTER — OFFICE VISIT (OUTPATIENT)
Dept: PRIMARY CARE CLINIC | Age: 55
End: 2020-11-24
Payer: COMMERCIAL

## 2020-11-24 PROCEDURE — 99211 OFF/OP EST MAY X REQ PHY/QHP: CPT | Performed by: NURSE PRACTITIONER

## 2020-11-24 NOTE — PROGRESS NOTES
Coco Joyce received a viral test for COVID-19. They were educated on isolation and quarantine as appropriate. For any symptoms, they were directed to seek care from their PCP, given contact information to establish with a doctor, directed to an urgent care or the emergency room.

## 2020-11-26 LAB — SARS-COV-2, NAA: NOT DETECTED

## 2020-11-30 ENCOUNTER — OFFICE VISIT (OUTPATIENT)
Dept: RHEUMATOLOGY | Age: 55
End: 2020-11-30
Payer: COMMERCIAL

## 2020-11-30 VITALS
BODY MASS INDEX: 36.4 KG/M2 | HEART RATE: 83 BPM | WEIGHT: 197.8 LBS | SYSTOLIC BLOOD PRESSURE: 139 MMHG | HEIGHT: 62 IN | TEMPERATURE: 98.3 F | DIASTOLIC BLOOD PRESSURE: 83 MMHG

## 2020-11-30 DIAGNOSIS — Z51.11 MAINTENANCE CHEMOTHERAPY: ICD-10-CM

## 2020-11-30 LAB
ALT SERPL-CCNC: 20 U/L (ref 10–40)
AST SERPL-CCNC: 19 U/L (ref 15–37)
BASOPHILS ABSOLUTE: 0 K/UL (ref 0–0.2)
BASOPHILS RELATIVE PERCENT: 0.5 %
CREAT SERPL-MCNC: 0.6 MG/DL (ref 0.6–1.1)
EOSINOPHILS ABSOLUTE: 0.1 K/UL (ref 0–0.6)
EOSINOPHILS RELATIVE PERCENT: 1.7 %
GFR AFRICAN AMERICAN: >60
GFR NON-AFRICAN AMERICAN: >60
HCT VFR BLD CALC: 38.3 % (ref 36–48)
HEMOGLOBIN: 12.5 G/DL (ref 12–16)
LYMPHOCYTES ABSOLUTE: 2.9 K/UL (ref 1–5.1)
LYMPHOCYTES RELATIVE PERCENT: 48.2 %
MCH RBC QN AUTO: 28.8 PG (ref 26–34)
MCHC RBC AUTO-ENTMCNC: 32.6 G/DL (ref 31–36)
MCV RBC AUTO: 88.3 FL (ref 80–100)
MONOCYTES ABSOLUTE: 0.3 K/UL (ref 0–1.3)
MONOCYTES RELATIVE PERCENT: 5 %
NEUTROPHILS ABSOLUTE: 2.7 K/UL (ref 1.7–7.7)
NEUTROPHILS RELATIVE PERCENT: 44.6 %
PDW BLD-RTO: 14.7 % (ref 12.4–15.4)
PLATELET # BLD: 279 K/UL (ref 135–450)
PMV BLD AUTO: 8.6 FL (ref 5–10.5)
RBC # BLD: 4.34 M/UL (ref 4–5.2)
WBC # BLD: 6 K/UL (ref 4–11)

## 2020-11-30 PROCEDURE — 20610 DRAIN/INJ JOINT/BURSA W/O US: CPT | Performed by: INTERNAL MEDICINE

## 2020-11-30 PROCEDURE — 99214 OFFICE O/P EST MOD 30 MIN: CPT | Performed by: INTERNAL MEDICINE

## 2020-11-30 RX ORDER — TRIAMCINOLONE ACETONIDE 40 MG/ML
80 INJECTION, SUSPENSION INTRA-ARTICULAR; INTRAMUSCULAR ONCE
Status: COMPLETED | OUTPATIENT
Start: 2020-11-30 | End: 2020-11-30

## 2020-11-30 RX ORDER — LIDOCAINE HYDROCHLORIDE 20 MG/ML
2 INJECTION, SOLUTION INFILTRATION; PERINEURAL ONCE
Status: COMPLETED | OUTPATIENT
Start: 2020-11-30 | End: 2020-11-30

## 2020-11-30 RX ADMIN — LIDOCAINE HYDROCHLORIDE 2 ML: 20 INJECTION, SOLUTION INFILTRATION; PERINEURAL at 09:37

## 2020-11-30 RX ADMIN — TRIAMCINOLONE ACETONIDE 80 MG: 40 INJECTION, SUSPENSION INTRA-ARTICULAR; INTRAMUSCULAR at 09:38

## 2020-11-30 NOTE — PROGRESS NOTES
2020  Patient Name: Eboni Kenney  : 1965  Medical Record: [id number]    MEDICATIONS  Current Outpatient Medications   Medication Sig Dispense Refill    meloxicam (MOBIC) 15 MG tablet TK 1 T PO QD 30 tablet 5    Etanercept (ENBREL MINI) 50 MG/ML SOCT Inject 50 mg into the skin once a week 4 Cartridge 5     No current facility-administered medications for this visit. ALLERGIES  No Known Allergies      Comments  No specialty comments available. Background history:  Eboni Kenney is a 48 y.o. female who is being seen for follow up evaluation of Joint pain. She is complaining of pain in hands, left hip and left knee. Pain in the hands started 2 years ago and is progressively getting worse. She describes her pain as constant, aching, 5-6 out of 10 without any significant relieving or aggravating factors. Pain is located in PIP, MCP, DIP joints. Pain in the left hip is located on the lateral side and gets worse on laying on the left side. Knees crack and pop. Knee pain gets worse going up and down the steps. She has swelling in the knuckles, stiffness throughout the day. She also has difficulty opening doorknobs and jar cans. She is on meloxicam 7.5 mg daily without improvement. She denies any history of psoriasis, inflammatory bowel disease, inflammatory back pain, uveitis, enthesitis, tenosynovitis or dactylitis. Interim history: She presents for follow-up of Osteoarthritis and rheumatoid arthritis. She is on Enbrel 50 mg once a week and meloxicam 15 mg daily. She received corticosteroid injection in both knees 3 months ago which helped significantly in the left knee but lasted only for 1 month in the right knee. She continues to complain of pain, swelling and stiffness in the right knee. She had MRI of the right knee that showed chondromalacia, degenerative changes, moderate effusion. She has occasional achiness in the left fourth PIP joint.   She denies any other joint pain or swelling or stiffness. She denies any morning stiffness. He denies any side effects with Enbrel. She denies any recent fevers or infections. She is off of methotrexate. She did not tolerate sulfasalazine [developed nausea, abdominal pain and diarrhea]. She did not tolerate Arava either [hair loss]. X-rays of the hands consistent with erosive osteoarthritis. Knee x-rays mild degenerative changes. Blood work shows low titer positive CCP, elevated ESR and CRP. Knee Pain        ROS    REVIEW OF SYSTEMS:   Constitutional: No unanticipated weight loss or fevers. No fatigue and malaise. Integumentary: No photosensitivity, malar rash, livedo reticularis, alopecia and Raynaud's symptoms, sclerodactyly, skin tightening  Eyes: negative for dry eyes, visual disturbance and persistent redness, discharge from eyes   ENT: - No tinnitus, loss of hearing, vertigo, or recurrent ear infections.  - No history of nasal/oral ulcers. - No history of sicca symptoms. Cardiovascular: No history of pericarditis, chest pain or murmur or palpitations  Respiratory: No shortness of breath, cough or history of interstitial lung disease. No history of pleurisy. No history of tuberculosis or atypical infections. Gastrointestinal: No history of dysphagia or esophageal dysmotility. No change in bowel habits or any inflammatory bowel disease. Genitourinary: No history renal disease, miscarriages. Hematologic/Lymphatic: No  bleeding, blood clots or swollen lymph nodes. Neurological: No history seizure or focal weakness.  No history of neuropathies, paresthesias or hyperesthesias, facial droop, diplopia  Psychiatric: No history of bipolar disease, anxiety, depression  Endocrine: Denies any thyroid / parathyroid disease and osteoporosis  Allergic/Immunologic: No nasal congestion         I have reviewed patients Past medical History, Social History and Family History as mentioned in her chart and this remains unchanged from previous.     Past Medical History:   Diagnosis Date    Anemia     Fibroids     Rhe[address] [address]      Past Surgical History:   Procedure Laterality Date    HYSTERECTOMY  10/2013    [facility], lso    TOOTH EXTRACTION  04/2018     Social History     Socioeconomic History    Marital status: Single     Spouse name: Not on file    Number of children: Not on file    Years of education: Not on file    Highest education level: Not on file   Occupational History    Not on file   Social Needs    Financial resource strain: Not on file    Food insecurity     Worry: Not on file     Inability: Not on file    Transportation needs     Medical: Not on file     Non-medical: Not on file   Tobacco Use    Smoking status: Never Smoker    Smokeless tobacco: Never Used   Substance and Sexual Activity    Alcohol use: Yes     Comment: RARELY    Drug use: No    Sexual activity: Yes     Partners: Male   Lifestyle    Physical activity     Days per week: Not on file     Minutes per session: Not on file    Stress: Not on file   Relationships    Social connections     Talks on phone: Not on file     Gets together: Not on file     Attends Orthodoxy service: Not on file     Active member of club or organization: Not on file     Attends meetings of clubs or organizations: Not on file     Relationship status: Not on file    Intimate partner violence     Fear of current or ex partner: Not on file     Emotionally abused: Not on file     Physically abused: Not on file     Forced sexual activity: Not on file   Other Topics Concern    Not on file   Social History Narrative    Not on file     Family History   Problem Relation Age of Onset    Diabetes Mother     Hypertension Mother     Heart Failure Mother     Heart Attack Mother     Diabetes Father     Cancer Sister         colon    Rheum Arthritis Neg Hx     Osteoarthritis Neg Hx     Asthma Neg Hx     Breast Cancer Neg Hx     High Cholesterol Neg Hx     Migraines respiratory distress  GI:  Soft, nondistended, normal bowel sounds, nontender, no organomegaly, no mass, no rebound, no guarding   :  No costovertebral angle tenderness   Integument:  Well hydrated, no telangiectasias  Lymphatic:  No lymphadenopathy noted   Neurologic:   Alert & oriented x 3, CN 2-12 normal, no focal deficits noted. Sensations Intact. Muscle strength 5/5 proximally and distally in upper and lower extremities.    Psychiatric:  Speech and behavior appropriate           LABS AND IMAGING  Outside data reviewed and in HPI    Lab Results   Component Value Date    WBC 7.0 08/31/2020    RBC 4.17 08/31/2020    HGB 12.2 08/31/2020    HCT 37.0 08/31/2020     08/31/2020    MCV 88.8 08/31/2020    MCH 29.2 08/31/2020    MCHC 32.8 08/31/2020    RDW 14.9 08/31/2020    NRBC 1 05/26/2020    NRBC 1 05/26/2020    SEGSPCT 34 09/02/2017    LYMPHOPCT 51.1 08/31/2020    MONOPCT 4.6 08/31/2020    BASOPCT 0.3 08/31/2020    MONOSABS 0.3 08/31/2020    LYMPHSABS 3.6 08/31/2020    EOSABS 0.1 08/31/2020    BASOSABS 0.0 08/31/2020       Chemistry        Component Value Date/Time     03/04/2020 0501    K 3.5 03/04/2020 0501     03/04/2020 0501    CO2 26 03/04/2020 0501    BUN 16 03/04/2020 0501    CREATININE 0.6 08/31/2020 0917        Component Value Date/Time    CALCIUM 9.2 03/04/2020 0501    ALKPHOS 111 03/04/2020 0501    AST 20 08/31/2020 0917    ALT 16 08/31/2020 0917    BILITOT <0.2 03/04/2020 0501          Lab Results   Component Value Date    SEDRATE 43 (H) 02/24/2020     Lab Results   Component Value Date    CRP 18.6 (H) 02/24/2020     No results found for: JADEN, SHANIQUE, SSA, SSB, C3, C4  Lab Results   Component Value Date    RF <10.0 03/16/2018    CCPABIGG 26 03/16/2018     No results found for: JADEN, ANATITER, ANAINT, PATH  No results found for: DSDNAG, DSDNAIGGIFA  Lab Results   Component Value Date    SSALAAB 0 03/16/2018     No results found for: SMAB, RNPAB  No results found for: CENTABIGG  No results obtained, using sterile technique, the anterior medial area was prepped and chlorhexidine was used as local anesthetic. The joint was entered and Kenalog 80 mg and 2 ml plain Lidocaine was then injected and the needle withdrawn. The procedure was well tolerated. No immediate complication noticed. The patient is asked to continue to rest the joint for a few more days before resuming regular activities. Advised patient to watch for fever, increased swelling or persistent pain in the joint. Call or return to clinic prn if such symptoms occur or there is failure to improve as anticipated. The patient indicates understanding of these issues and agrees with the plan. Return in about 3 months (around 2/28/2021). The risks and benefits of my recommendations, as well as other treatment options, benefits and side effects were discussed with the patient. All questions were answered. ######################################################################    I thank you for giving me the opportunity to participate in [facility]. If you have any questions or concerns please feel free to contact me. I look forward to following  Poly Cr along with you. Electronically signed by: Magdaleno Jurado MD, 11/30/2020 9:00 AM    Documentation was done using voice recognition dragon software. Every effort was made to ensure accuracy; however, inadvertent unintentional computerized transcription errors may be present.

## 2020-12-02 ENCOUNTER — OFFICE VISIT (OUTPATIENT)
Dept: ORTHOPEDIC SURGERY | Age: 55
End: 2020-12-02
Payer: COMMERCIAL

## 2020-12-02 VITALS — TEMPERATURE: 97.7 F | WEIGHT: 197 LBS | HEIGHT: 62 IN | BODY MASS INDEX: 36.25 KG/M2

## 2020-12-02 PROCEDURE — 99243 OFF/OP CNSLTJ NEW/EST LOW 30: CPT | Performed by: ORTHOPAEDIC SURGERY

## 2020-12-02 NOTE — PROGRESS NOTES
ORTHOPAEDIC CONSULTATION NOTE    Chief Complaint   Patient presents with    Knee Pain     Right  Knee pain       HPI  48 y.o. female seen in consultation at the request of Jacque Sung MD for evaluation of right knee pain    Onset chronic > 6 months  Injury/trauma none  History of symptoms as above  Pain is located anteromedial right knee  Worse with activity, WB, getting up  Better with rest, tylenol, mobic    Previous right knee steroid injections ~6 to 7 she reports  Most recent was on 8/31/2020, and that one didn't help  Had left knee injection yesterday with Dr Frida Landry    Rheumatoid arthritis - on Enbrel    I have reviewed and discussed the below pain assessment findings with the patient. Pain Assessment  Location of Pain: Knee  Location Modifiers: Right  Severity of Pain: 4  Quality of Pain: Aching, Popping  Duration of Pain: Persistent  Frequency of Pain: Intermittent  Date Pain First Started: 06/02/20  Aggravating Factors:  Other (Comment)(getting up from sitting position)  Limiting Behavior: Yes  Relieving Factors: Rest, Other (Comment)(tylenol)  Result of Injury: No  Work-Related Injury: No  Are there other pain locations you wish to document?: No    Review of Systems  Constitutional - denies fevers, weight loss  Cardiovascular - denies chest pain, palpitations, + peripheral edema  Respiratory - denies SOB, cough  Gastrointestinal - denies abdominal pain, nausea, vomiting; + reflux  Genitourinary - denies dysuria, discharge  Musculoskeletal - per HPI  Integumentary - denies rash, sores  Neurologic - denies numbness, tingling, paresthesias  Hematologic - denies abnormal bleeding, blood clots/VTE  Allergic/Immunologic - denies metal allergies, recurrent infections    No Known Allergies     Current Outpatient Medications   Medication Sig Dispense Refill    meloxicam (MOBIC) 15 MG tablet TK 1 T PO QD 30 tablet 5    Etanercept (ENBREL MINI) 50 MG/ML SOCT Inject 50 mg into the skin once a week 4 Cartridge 5     No current facility-administered medications for this visit.         Past Medical History:   Diagnosis Date    Anemia     Fibroids     Rheumatoid arthritis ([facility] [address]         Past Surgical History:   Procedure Laterality Date    HYSTERECTOMY  10/2013    TLH, lso    TOOTH EXTRACTION  04/2018       Family History   Problem Relation Age of Onset    Diabetes Mother     Hypertension Mother     Heart Failure Mother     Heart Attack Mother     Diabetes Father     Cancer Sister         colon    Rheum Arthritis Neg Hx     Osteoarthritis Neg Hx     Asthma Neg Hx     Breast Cancer Neg Hx     High Cholesterol Neg Hx     Migraines Neg Hx     Ovarian Cancer Neg Hx     Rashes/Skin Problems Neg Hx     Seizures Neg Hx     Stroke Neg Hx     Thyroid Disease Neg Hx        Social History     Socioeconomic History    Marital status: Single     Spouse name: Not on file    Number of children: Not on file    Years of education: Not on file    Highest education level: Not on file   Occupational History    Not on file   Social Needs    Financial resource strain: Not on file    Food insecurity     Worry: Not on file     Inability: Not on file    Transportation needs     Medical: Not on file     Non-medical: Not on file   Tobacco Use    Smoking status: Never Smoker    Smokeless tobacco: Never Used   Substance and Sexual Activity    Alcohol use: Yes     Comment: RARELY    Drug use: No    Sexual activity: Yes     Partners: Male   Lifestyle    Physical activity     Days per week: Not on file     Minutes per session: Not on file    Stress: Not on file   Relationships    Social connections     Talks on phone: Not on file     Gets together: Not on file     Attends Latter day service: Not on file     Active member of club or organization: Not on file     Attends meetings of clubs or organizations: Not on file     Relationship status: Not on file    Intimate partner violence     Fear of current or ex partner: Not on file Emotionally abused: Not on file     Physically abused: Not on file     Forced sexual activity: Not on file   Other Topics Concern    Not on file   Social History Narrative    Not on file        Vitals:    12/02/20 0925   Temp: 97.7 °F (36.5 °C)   Weight: 197 lb (89.4 kg)   Height: 5' 2\" (1.575 m)       Physical Exam  Constitutional -  well-nourished, Body mass index is 36.03 kg/m². Psychiatric -   normal mood & affect, oriented to place, person, and situation  Cardiovascular - RRR, positive peripheral edema, posterior tibialis pulse 2+  Respiratory - respirations unlabored, on room air; face mask on  Skin - no rashes, wounds, or lesions seen on exposed skin  Neck/Spine - no radicular pain with SLR. Neurological - SILT SP/DP/T/sural/saphenous nerve distributions; EHL/FHL/TA/GS intact  Right knee:   neutral alignment    effusion noted   No obvious atrophy seen, but limited by body habitus    Mild tenderness to palpation medial joint line   Mild tenderness to palpation lateral joint line   Range of Motion:    Extension:  3    Flexion:  113   Special tests:    positive crepitus with ROM    positive patellar grind   Ligamentous testing:    Varus stress stable    Valgus stress stable    Lachman stable    Posterior Drawer stable        Imaging:  Images were personally reviewed by myself and discussed with the patient  Right knee 4 views performed today in clinic   - Overall alignment is mild varus. The medial compartment is mildly to moderately narrowed with small osteophytes seen. The lateral compartment is mildly narrowed with small osteophytes seen. The anterior compartment is mildly to moderately narrowed with small osteophytes seen. The patella is well-centered within the trochlear groove. There are no loose bodies appreciated.       Narrative    EXAMINATION:    MRI OF THE RIGHT KNEE WITHOUT CONTRAST, 11/19/2020 9:02 am         TECHNIQUE:    Multiplanar multisequence MRI of the right knee was performed without the    administration of intravenous contrast.         COMPARISON:    None.         HISTORY:    ORDERING SYSTEM PROVIDED HISTORY: Acute pain of right knee    TECHNOLOGIST PROVIDED HISTORY:    Reason for exam:->right knee pain    Is the patient pregnant?->No    Reason for Exam: PT STS NO KNOWN INJURY TO RIGHT KNEE    Acuity: Acute    Type of Exam: Initial    Additional signs and symptoms: PT STS NO KNOWN INJURY TO RIGHT KNEE    Relevant Medical/Surgical History: PT STS NO KNOWN INJURY TO RIGHT KNEE         FINDINGS:    MENISCI: Lateral meniscus is intact.  Blunting of the posterior horn medial    meniscus with subtle increased T2 signal noted throughout the posterior horn. No fluid like signal.         CRUCIATE LIGAMENTS: ACL and PCL are intact.         EXTENSOR MECHANISM: Quadriceps and patellar tendons are intact.         LATERAL COLLATERAL LIGAMENT COMPLEX: The popliteus tendon, biceps femoris    tendon, fibular collateral ligament and iliotibial band are intact.         MEDIAL COLLATERAL LIGAMENT COMPLEX:         MCL is mildly thickened and increased in T2 signal with mild adjacent edema.     No retracted tear.         KNEE JOINT: Small to moderate joint effusion.  Small tricompartment    osteophytes.  Grade 2-3 chondral fissuring along the patella.  Grade 3-4    chondral fissuring medial trochlea.  Grade 3-4 chondral fissuring    weight-bearing medial compartment with mild subchondral edema.  No    intra-articular body.         BONE MARROW: No acute fracture or suspicious marrow replacing lesion.         Small semimembranosus gastrocnemius bursal cyst.              Impression    Degenerative signal throughout the posterior horn of the medial meniscus with    blunting of the inner free edge.  No fluid like signal to suggest discrete    tear.         Low-grade MCL injury.         Small to moderate joint effusion.         Chondromalacia most prominent medial compartment.         Small semimembranosus gastrocnemius bursal cyst.             Full thickness cartilage loss of the medial femoral condyle      Assessment & Plan:  48 y.o. female who presents with    Diagnosis Orders   1. Primary osteoarthritis of right knee  XR KNEE RIGHT (MIN 4 VIEWS)    [facility]    DUROLANE INJECTION (For Auth/Precert)   2. Rheumatoid arthritis involving multiple sites with positive rheumatoid factor ([address]      continue Enbrel and mgmt per Dr Harvinder George           Procedures    [facility] (For Auth/Precert)       Thank you Dr Harvinder George for referring Jaquan Bell to me for evaluation of her right knee:      Discussed at length conservative treatment of knee arthritis, according to AAOS Clinical Practice Guidelines:  1)  Recommend ice and anti-inflammatories to reduce pain and swelling. she is using mobic. 2)  Recommend weight loss to reduce stresses on the knee, particularly the patellofemoral compartment which sees up to 6x body weight. 3)  Physical therapy referral with transition to home program, focusing on strengthening, low impact aerobic exercises, and neuromuscular education. 4)  Evidence for intra-articular injections is not as strong, however, they are an option. Injections consist of either corticosteroid or hyaluronic acid viscosupplementation. Informed patient corticosteroid injections can be performed every 4 months as needed, and viscosupplementation every 6 months or so. Steroid injections no longer helping. Will submit for visco.      Given her age and advanced degenerative changes already present, total knee arthroplasty likely in the future. Goal to delay TKA for as long as possible. Patient understood.       Hailee Le

## 2020-12-03 ENCOUNTER — TELEPHONE (OUTPATIENT)
Dept: ORTHOPEDIC SURGERY | Age: 55
End: 2020-12-03

## 2020-12-07 ENCOUNTER — TELEPHONE (OUTPATIENT)
Dept: ORTHOPEDIC SURGERY | Age: 55
End: 2020-12-07

## 2020-12-07 NOTE — TELEPHONE ENCOUNTER
[facility]   [facility]  [facility]. APPROVED #  [id number]. DATES:  12/03/2020 - 05/31/2021. BUY & BILL. PER CODEY @ [facility] DEPT. FAXED APPROVAL LETTER COMING. ALFREDA    12/14/2020  CAN'T FIND SPECIALTY PHARMACY THAT WILL COVER THE PLANS PREFERRED DRUG [id number]. THE THREE PHARMACY THE PLAN USES ALL RESPOND THAT THE DRUGS ARE NOT COVERED UNDER THE PHARMACY PLANS. [facility] RESPONDED TO TRY [facility]. [address] [place] [address] DON'T COVER [id number] NOR DOES [facility]. WE WILL HAVE TO USE OUR SUPPLY EVEN THOUGH IT IS MORE COSTLY PER TAI. 12/7/2020 VERBAL RX GIVEN TO Austin Barakat / Joe Taylor @ [facility] P:[phone]. THEY WILL VERIFY COVERAGE, CONTACT PATIENT FOR CONSENT TO SHIP AND COPAYMENT THEN CALL TO SCHEDULE OFFICE DELIVERY TO THE FF OFFICE.   ALFREDA

## 2020-12-09 ENCOUNTER — HOSPITAL ENCOUNTER (OUTPATIENT)
Dept: PHYSICAL THERAPY | Age: 55
Setting detail: THERAPIES SERIES
Discharge: HOME | End: 2020-12-09
Payer: COMMERCIAL

## 2020-12-09 ENCOUNTER — TELEPHONE (OUTPATIENT)
Dept: INTERNAL MEDICINE CLINIC | Age: 55
End: 2020-12-09

## 2020-12-09 PROCEDURE — 97110 THERAPEUTIC EXERCISES: CPT

## 2020-12-09 PROCEDURE — 97161 PT EVAL LOW COMPLEX 20 MIN: CPT

## 2020-12-09 NOTE — TELEPHONE ENCOUNTER
Marium Roberto from [place] called to speak with Dr. Tahmina Jackson assistant to discuss some [facility] paperwork.  Please call   [phone]

## 2020-12-09 NOTE — PLAN OF CARE
[address] [address], [address] [place], [place] [address]  Phone: [phone]   Fax: [phone]                                                       Physical Therapy Certification    Dear Referring Practitioner: Lucero Farris MD,    We had the pleasure of evaluating the following patient for physical therapy services at [address] [place] [address]. A summary of our findings can be found in the initial assessment below. This includes our plan of care. If you have any questions or concerns regarding these findings, please do not hesitate to contact me at the office phone number checked above. Thank you for the referral.       Physician Signature:_______________________________Date:__________________  By signing above (or electronic signature), therapists plan is approved by physician      Patient: Abeba Mane   : 1965   MRN: [id number]  Referring Physician: Referring Practitioner: Lucero Farris MD      Evaluation Date: 2020      Medical Diagnosis Information:  Diagnosis: Primary osteoarthritis of right knee   Treatment Diagnosis: R knee pain, lateral patellar tracking, weakness in posterior lateral hip                                         Insurance information: PT Insurance Information: [employer] PPO (; telehealth allowed)     Precautions/ Contra-indications: RA  Latex Allergy:  [x]NO      []YES  Preferred Language for Healthcare:   [x]English       []other:    SUBJECTIVE: Patient stated complaint: Pt reports the knee pain started 6-8 months ago with insidious onset. Currently knee pain is on and off. Was working 2 jobs, but took a leave of absence from her second job due to the knee pain. Pt had an injection about 2 weeks ago in the L knee, but not the R knee. The left knee feels much better. The knee swells occasionally and when it does it is hard to walk and climb stairs. Pt is hoping to prolong TKA. Relevant Medical History: RA, OA  Functional Outcome: LEFS raw score = 65; dysfunction = 19%    Pain Scale: 3-10/10  Easing factors: medications  Provocative factors: going from sitting to standing, walking long distances     Type: []Constant   [x]Intermittent  []Radiating []Localized []Other:     Numbness/Tingling: Denies    Occupation/School: Employed - works with chemicals, walking/standing for entire shift     Living Status/Prior Level of Function:Prior to this injury / incident, pt was independent with ADLs and IADLs. Does have help at home for days that are really bad. Has stairs at home - between 8-10 steps to second floor.        OBJECTIVE:   Functional Mobility/Transfers: independent with all transfers     Posture: mild genu valgus in supine      Gait: Mild antalgic gait     Dermatomes Normal Abnormal Comments   inguinal area (L1)    Pt reports no sensation deficits in LEs   anterior mid-thigh (L2)      distal ant thigh/med knee (L3)      medial lower leg and foot (L4)      lateral lower leg and foot (L5)      posterior calf (S1)      medial calcaneus (S2)          Reflexes- Not Tested  Normal Abnormal Comments   S1-2 Seated achilles      S1-2 Prone knee bend      L3-4 Patellar tendon      Clonus      Babinski           PROM AROM    L R L R   Hip Flexion       Hip Abduction       Hip ER       Hip IR       Knee Flexion   132 130   Knee Extension   0 0   Dorsiflexion        Plantarflexion        Inversion        Eversion            Strength (0-5) / Myotomes Left Right   Hip Flexion - supine 5 4+   Hip Flexion - seated (L1-2) 5 5   Hip Abduction 4- 4-   Hip Extension 3+ 3+   Hip ER 5 5   Hip IR 5 5   Quads (L2-4) 5 5   Hamstrings 5 5        Flexibility     Hamstrings (90/90) Lack 18 Lack 16   ITB (Kinsey)     Quads (Ely's) Moderate limitation Moderate limitation   Hip Flexor Gary Flor            Orthopaedic Special Tests  Positive  Negative  NT Comments    Knee       Lachman's / Anterior Drawer       Posterior Drawer       Varus Stress       Valgus Stress       Juliana's        Appley's       Thessaly's       Patellar Tracking    Tracks laterally bilaterally                      Balance: L SLS 12 sec       R SLS 30 sec                       [x] Patient history, allergies, meds reviewed. Medical chart reviewed. See intake form. Review Of Systems (ROS):  [x]Performed Review of systems (Integumentary, CardioPulmonary, Neurological) by intake and observation. Intake form has been scanned into medical record. Patient has been instructed to contact their primary care physician regarding ROS issues if not already being addressed at this time.       Co-morbidities/Complexities (which will affect course of rehabilitation):  []None           Arthritic conditions   [x]Rheumatoid arthritis (M05.9)  [x]Osteoarthritis (M19.91)   Cardiovascular conditions   []Hypertension (I10)  []Hyperlipidemia (E78.5)  []Angina pectoris (I20)  []Atherosclerosis (I70)   Musculoskeletal conditions   []Disc pathology   []Congenital spine pathologies   []Prior surgical intervention  []Osteoporosis (M81.8)  []Osteopenia (M85.8)   Endocrine conditions   []Hypothyroid (E03.9)  []Hyperthyroid Gastrointestinal conditions   []Constipation (V83.70)   Metabolic conditions   []Morbid obesity (E66.01)  []Diabetes type 1(E10.65) or 2 (E11.65)   []Neuropathy (G60.9)     Pulmonary conditions   []Asthma (J45)  []Coughing   []COPD (J44.9)   Psychological Disorders  []Anxiety (F41.9)  []Depression (F32.9)   []Other:   [x]Other: Anemia          Barriers to/and or personal factors that will affect rehab potential:              []Age  []Sex    []Smoker              [x]Motivation/Lack of Motivation                        []Co-Morbidities              []Cognitive Function, education/learning barriers              []Environmental, home barriers              [x]profession/work barriers  []past PT/medical experience  []other:  Justification: Pt very motivated, however, works a physically demanding job. Falls Risk Assessment (30 days):   [x] Falls Risk assessed and no intervention required. [] Falls Risk assessed and Patient requires intervention due to being higher risk   TUG score (>12s at risk):     [] Falls education provided, including        ASSESSMENT: Pt is a 48year old female referred to physical therapy for R knee pain. Pt demonstrates WNL ROM and only present with weakness in the posterior lateral hips. Pt is highly motivated to attend therapy to address the above deficits to prolong the need for a TKA. Functional Impairments:     []Noted lumbar/proximal hip/LE joint hypomobility   []Decreased LE functional ROM   [x]Decreased core/proximal hip strength and neuromuscular control   []Decreased LE functional strength   [x]Reduced balance/proprioceptive control   []other:      Functional Activity Limitations (from functional questionnaire and intake)   []Reduced ability to tolerate prolonged functional positions   []Reduced ability or difficulty with changes of positions or transfers between positions   []Reduced ability to maintain good posture and demonstrate good body mechanics with sitting, bending, and lifting   []Reduced ability to sleep   [] Reduced ability or tolerance with driving and/or computer work   []Reduced ability to perform lifting, carrying tasks   []Reduced ability to squat   []Reduced ability to forward bend   [x]Reduced ability to ambulate prolonged functional periods/distances/surfaces   []Reduced ability to ascend/descend stairs   []Reduced ability to run, hop, cut or jump   []other:    Participation Restrictions   []Reduced participation in self care activities   []Reduced participation in home management activities   [x]Reduced participation in work activities   []Reduced participation in social activities. []Reduced participation in sport/recreation activities.     Classification :    []Signs/symptoms consistent with post-surgical status including decreased ROM, strength and function. []Signs/symptoms consistent with joint sprain/strain   []Signs/symptoms consistent with patella-femoral syndrome   [x]Signs/symptoms consistent with knee OA/hip OA   []Signs/symptoms consistent with internal derangement of knee/Hip   []Signs/symptoms consistent with functional hip weakness/NMR control      []Signs/symptoms consistent with tendinitis/tendinosis    []signs/symptoms consistent with pathology which may benefit from Dry needling      []other:      Prognosis/Rehab Potential:      []Excellent   [x]Good    []Fair   []Poor    Tolerance of evaluation/treatment:    []Excellent   [x]Good    []Fair   []Poor    Physical Therapy Evaluation Complexity Justification  [x] A history of present problem with:  [] no personal factors and/or comorbidities that impact the plan of care;  [x]1-2 personal factors and/or comorbidities that impact the plan of care  []3 personal factors and/or comorbidities that impact the plan of care  [x] An examination of body systems using standardized tests and measures addressing any of the following: body structures and functions (impairments), activity limitations, and/or participation restrictions;:  [x] a total of 1-2 or more elements   [] a total of 3 or more elements   [] a total of 4 or more elements   [x] A clinical presentation with:  [x] stable and/or uncomplicated characteristics   [] evolving clinical presentation with changing characteristics  [] unstable and unpredictable characteristics;   [x] Clinical decision making of [x] low, [] moderate, [] high complexity using standardized patient assessment instrument and/or measurable assessment of functional outcome.     [x] EVAL (LOW) 32302 (typically 15 minutes face-to-face)  [] EVAL (MOD) 99504 (typically 30 minutes face-to-face)  [] EVAL (HIGH) 53918 (typically 45 minutes face-to-face)  [] RE-EVAL     PLAN:   Frequency/Duration:  1 days per week for 6 activities including climbing stairs 100% of the time without increased symptoms or restriction. [] Progressing: [] Met: [] Not Met: [] Adjusted  4. Patient will return to walking for an entire shift without taking a break due to pain.      [] Progressing: [] Met: [] Not Met: [] Adjusted     Electronically signed by:  Bairon Ohara PT DPT

## 2020-12-09 NOTE — TELEPHONE ENCOUNTER
I spoke to [facility] and we fix the paper work for her ADRIANO Covarrubias.

## 2020-12-09 NOTE — FLOWSHEET NOTE
questions were answered    Home Exercise Program:  Access Code: [email or web address]   URL: [email or web address]   Date: 12/09/2020   Prepared by: Malou Jacinto     Exercises   · Prone Quadriceps Stretch with Strap - 3 reps - 1 sets - 30 seconds hold - 1x daily - 7x weekly   · Prone Hip Extension - 10 reps - 2 sets - 1x daily - 7x weekly   · Clamshell - 10 reps - 2 sets - 1x daily - 7x weekly       Therapeutic Exercise and NMR EXR  [x] (97816) Provided verbal/tactile cueing for activities related to strengthening, flexibility, endurance, ROM for improvements in  [x] LE / Lumbar: LE, proximal hip, and core control with self care, mobility, lifting, ambulation. [] UE / Cervical: cervical, postural, scapular, scapulothoracic and UE control with self care, reaching, carrying, lifting, house/yardwork, driving, computer work.  [] (89397) Provided verbal/tactile cueing for activities related to improving balance, coordination, kinesthetic sense, posture, motor skill, proprioception to assist with   [] LE / lumbar: LE, proximal hip, and core control in self care, mobility, lifting, ambulation and eccentric single leg control. [] UE / cervical: cervical, scapular, scapulothoracic and UE control with self care, reaching, carrying, lifting, house/yardwork, driving, computer work.   [] (22842) Therapist is in constant attendance of 2 or more patients providing skilled therapy interventions, but not providing any significant amount of measurable one-on-one time to either patient, for improvements in  [] LE / lumbar: LE, proximal hip, and core control in self care, mobility, lifting, ambulation and eccentric single leg control. [] UE / cervical: cervical, scapular, scapulothoracic and UE control with self care, reaching, carrying, lifting, house/yardwork, driving, computer work.      NMR and Therapeutic Activities:    [] (14091 or ) Provided verbal/tactile cueing for activities related to improving balance, coordination, kinesthetic sense, posture, motor skill, proprioception and motor activation to allow for proper function of   [] LE: / Lumbar core, proximal hip and LE with self care and ADLs  [] UE / Cervical: cervical, postural, scapular, scapulothoracic and UE control with self care, carrying, lifting, driving, computer work.   [] (46068) Gait Re-education- Provided training and instruction to the patient for proper LE, core and proximal hip recruitment and positioning and eccentric body weight control with ambulation re-education including up and down stairs     Home Management Training / Self Care:  [] (74458) Provided self-care/home management training related to activities of daily living and compensatory training, and/or use of adaptive equipment for improvement with: ADLs and compensatory training, meal preparation, safety procedures and instruction in use of adaptive equipment, including bathing, grooming, dressing, personal hygiene, basic household cleaning and chores.      Home Exercise Program:    [x] (52961) Reviewed/Progressed HEP activities related to strengthening, flexibility, endurance, ROM of   [x] LE / Lumbar: core, proximal hip and LE for functional self-care, mobility, lifting and ambulation/stair navigation   [] UE / Cervical: cervical, postural, scapular, scapulothoracic and UE control with self care, reaching, carrying, lifting, house/yardwork, driving, computer work  [] (05407)Reviewed/Progressed HEP activities related to improving balance, coordination, kinesthetic sense, posture, motor skill, proprioception of   [] LE: core, proximal hip and LE for self care, mobility, lifting, and ambulation/stair navigation    [] UE / Cervical: cervical, postural,  scapular, scapulothoracic and UE control with self care, reaching, carrying, lifting, house/yardwork, driving, computer work    Manual Treatments:  PROM / STM / Oscillations-Mobs:  G-I, II, III, IV (PA's, Inf., Post.)  [] (50570) Provided manual therapy to LEFS to assist with reaching prior level of function. []? Progressing: []? Met: []? Not Met: []? Adjusted  2. Patient will demonstrate an increase in Strength to at least 5/5 as well as good proximal hip strength and control to allow for proper functional mobility as indicated by patients Functional Deficits. []? Progressing: []? Met: []? Not Met: []? Adjusted  3. Patient will return to functional activities including climbing stairs 100% of the time without increased symptoms or restriction. []? Progressing: []? Met: []? Not Met: []? Adjusted  4. Patient will return to walking for an entire shift without taking a break due to pain. []? Progressing: []? Met: []? Not Met: []? Adjusted         Overall Progression Towards Functional goals/ Treatment Progress Update:  [] Patient is progressing as expected towards functional goals listed. [] Progression is slowed due to complexities/Impairments listed. [] Progression has been slowed due to co-morbidities. [x] Plan just implemented, too soon to assess goals progression <30days   [] Goals require adjustment due to lack of progress  [] Patient is not progressing as expected and requires additional follow up with physician  [] Other    Persisting Functional Limitations/Impairments:  []Sleeping []Sitting               []Standing []Transfers        [x]Walking []Kneeling               []Stairs []Squatting / bending   []ADLs []Reaching  []Lifting  []Housework  []Driving [x]Job related tasks  []Sports/Recreation []Other:        ASSESSMENT:  See eval  Treatment/Activity Tolerance:  [x] Patient able to complete tx [] Patient limited by fatigue  [] Patient limited by pain  [] Patient limited by other medical complications  [] Other:     Prognosis: [x] Good [] Fair  [] Poor    Patient Requires Follow-up: [x] Yes  [] No    Plan for next treatment session: hip strengthening, quad flexibility     PLAN: See eval. PT 1x / week for 6 weeks.    [] Continue per plan of care [] Alter current plan (see comments)  [x] Plan of care initiated [] Hold pending MD visit [] Discharge    Electronically signed by: Mary Kay Mcbride PT, DPT    Note: If patient does not return for scheduled/ recommended follow up visits, this note will serve as a discharge from care along with most recent update on progress.

## 2020-12-19 ENCOUNTER — HOSPITAL ENCOUNTER (OUTPATIENT)
Dept: PHYSICAL THERAPY | Age: 55
Setting detail: THERAPIES SERIES
Discharge: HOME | End: 2020-12-19
Payer: COMMERCIAL

## 2020-12-19 PROCEDURE — 97110 THERAPEUTIC EXERCISES: CPT

## 2020-12-19 PROCEDURE — 97112 NEUROMUSCULAR REEDUCATION: CPT

## 2020-12-19 NOTE — FLOWSHEET NOTE
[address] [facility] Physical Therapy  Phone: [phone]   Fax: [phone]    Physical Therapy Daily Treatment Note  Date:  2020    Patient Name:  Coco Joyce    :  1965  MRN: [id number]  Medical/Treatment Diagnosis Information:  · Diagnosis: Primary osteoarthritis of right knee  · Treatment Diagnosis: R knee pain, lateral patellar tracking, weakness in posterior lateral hip  Insurance/Certification information:  PT Insurance Information: [employer] PPO (; telehealth allowed)  Physician Information:  Referring Practitioner: James Lewis MD   Plan of care signed (Y/N): [x]  Yes []  No     Date of Patient follow up with Physician:      Progress Report: []  Yes  [x]  No     Date Range for reporting period:  Beginnin20  Ending:     Progress report due (10 Rx/or 30 days whichever is less): visit #6 or  3/9/44     Recertification due (POC duration/ or 90 days whichever is less): visit #6 or 20     Visit # Insurance Allowable Auth required? Date Range    Medical necessity []  Yes  [x]  No n/a     Latex Allergy:  [x]NO      []YES  Preferred Language for Healthcare:   [x]English       []other:    Functional Scale:        Date assessed:  LEFS raw score = 65; dysfunction = 19%   20    Pain level:  2/10     SUBJECTIVE:  Pt reports that she has tried to perform HEP but it causes her L leg to cramp up. Pt states she has been more conscious about drinking water the last 2 days.      OBJECTIVE:      RESTRICTIONS/PRECAUTIONS: RA    Exercises/Interventions:     Therapeutic Exercises (09535) Resistance / level Sets/sec Reps Notes   Scifit L 1 5 min     IB - gastroc  HR  TR  IB - soleus  2/30 sec  2  2  2/30 sec   10  10    HSS  HFS  30 sec B  30 sec B     Quad stretching       Hip abd strength       Prone:  · Hip ext - L  · HS curls  · Quad stretch with SOS    1  1  30 sec B   10   10 B   Cues for maintaining neutral pelvis Therapeutic Activities (59877)                                          Neuromuscular Re-ed (75441)       Gliders - lateral  1 10 B MAX cues for form    Gliders  - retro  1 10 B    Quadruped:  · Hip extension - R    1   12   Unable to maintain neutral pelvis                        Manual Intervention ([phone])                                                     Pt. Education:  -patient educated on diagnosis, prognosis and expectations for rehab  -all patient questions were answered    Home Exercise Program:  Access Code: [email or web address]   URL: [employer]/   Date: 12/09/2020   Prepared by: Adrein Blind     Exercises   · Prone Quadriceps Stretch with Strap - 3 reps - 1 sets - 30 seconds hold - 1x daily - 7x weekly   · Prone Hip Extension - 10 reps - 2 sets - 1x daily - 7x weekly   · Clamshell - 10 reps - 2 sets - 1x daily - 7x weekly       Therapeutic Exercise and NMR EXR  [x] (73560) Provided verbal/tactile cueing for activities related to strengthening, flexibility, endurance, ROM for improvements in  [x] LE / Lumbar: LE, proximal hip, and core control with self care, mobility, lifting, ambulation. [] UE / Cervical: cervical, postural, scapular, scapulothoracic and UE control with self care, reaching, carrying, lifting, house/yardwork, driving, computer work.  [] (42151) Provided verbal/tactile cueing for activities related to improving balance, coordination, kinesthetic sense, posture, motor skill, proprioception to assist with   [] LE / lumbar: LE, proximal hip, and core control in self care, mobility, lifting, ambulation and eccentric single leg control.    [] UE / cervical: cervical, scapular, scapulothoracic and UE control with self care, reaching, carrying, lifting, house/yardwork, driving, computer work.   [] (17700) Therapist is in constant attendance of 2 or more patients providing skilled therapy interventions, but not providing any significant amount of measurable one-on-one time to either patient, for improvements in  [] LE / lumbar: LE, proximal hip, and core control in self care, mobility, lifting, ambulation and eccentric single leg control. [] UE / cervical: cervical, scapular, scapulothoracic and UE control with self care, reaching, carrying, lifting, house/yardwork, driving, computer work. NMR and Therapeutic Activities:    [x] (87679 or 34189) Provided verbal/tactile cueing for activities related to improving balance, coordination, kinesthetic sense, posture, motor skill, proprioception and motor activation to allow for proper function of   [x] LE: / Lumbar core, proximal hip and LE with self care and ADLs  [] UE / Cervical: cervical, postural, scapular, scapulothoracic and UE control with self care, carrying, lifting, driving, computer work.   [] (88325) Gait Re-education- Provided training and instruction to the patient for proper LE, core and proximal hip recruitment and positioning and eccentric body weight control with ambulation re-education including up and down stairs     Home Management Training / Self Care:  [] (04953) Provided self-care/home management training related to activities of daily living and compensatory training, and/or use of adaptive equipment for improvement with: ADLs and compensatory training, meal preparation, safety procedures and instruction in use of adaptive equipment, including bathing, grooming, dressing, personal hygiene, basic household cleaning and chores.      Home Exercise Program:    [] (48332) Reviewed/Progressed HEP activities related to strengthening, flexibility, endurance, ROM of   [] LE / Lumbar: core, proximal hip and LE for functional self-care, mobility, lifting and ambulation/stair navigation   [] UE / Cervical: cervical, postural, scapular, scapulothoracic and UE control with self care, reaching, carrying, lifting, house/yardwork, driving, computer work  [] (89889)Reviewed/Progressed HEP activities related to improving balance, coordination, kinesthetic sense, posture, motor skill, proprioception of   [] LE: core, proximal hip and LE for self care, mobility, lifting, and ambulation/stair navigation    [] UE / Cervical: cervical, postural,  scapular, scapulothoracic and UE control with self care, reaching, carrying, lifting, house/yardwork, driving, computer work    Manual Treatments:  PROM / STM / Oscillations-Mobs:  G-I, II, III, IV (PA's, Inf., Post.)  [] (72990) Provided manual therapy to mobilize LE, proximal hip and/or LS spine soft tissue/joints for the purpose of modulating pain, promoting relaxation,  increasing ROM, reducing/eliminating soft tissue swelling/inflammation/restriction, improving soft tissue extensibility and allowing for proper ROM for normal function with   [] LE / lumbar: self care, mobility, lifting and ambulation. [] UE / Cervical: self care, reaching, carrying, lifting, house/yardwork, driving, computer work. Modalities:  [] (12780) Vasopneumatic compression: Utilized vasopneumatic compression to decrease edema / swelling for the purpose of improving mobility and quad tone / recruitment which will allow for increased overall function including but not limited to self-care, transfers, ambulation, and ascending / descending stairs. Modalities:      Charges:  Timed Code Treatment Minutes: 42   Total Treatment Minutes: 42     [] EVAL - LOW (24169)   [] EVAL - MOD (49336)  [] EVAL - HIGH (42559)  [] RE-EVAL (45590)  [x] UN(49117) x 2      [] Ionto  [x] NMR (72959) x   1    [] Vaso  [] Manual (76011) x       [] Ultrasound  [] TA x        [] Mech Traction (84898)  [] Aquatic Therapy x     [] ES (un) (03942):   [] Home Management Training x  [] ES(attended) (81429)   [] Dry Needling 1-2 muscles (28620):  [] Dry Needling 3+ muscles (848784)  [] Group:      [] Other:     GOALS:   Patient stated goal: Less knee pain   []? Progressing: []? Met: []? Not Met: []?  Adjusted     Therapist goals for Patient:   Short Term Goals: To be achieved in: 2 weeks  1. Independent in HEP and progression per patient tolerance, in order to prevent re-injury. []? Progressing: []? Met: []? Not Met: []? Adjusted  2. Patient will have a decrease in pain to facilitate improvement in movement, function, and ADLs as indicated by Functional Deficits. []? Progressing: []? Met: []? Not Met: []? Adjusted     Long Term Goals: To be achieved in: 6 weeks  1. Disability index score of 10% or less for the LEFS to assist with reaching prior level of function. []? Progressing: []? Met: []? Not Met: []? Adjusted  2. Patient will demonstrate an increase in Strength to at least 5/5 as well as good proximal hip strength and control to allow for proper functional mobility as indicated by patients Functional Deficits. []? Progressing: []? Met: []? Not Met: []? Adjusted  3. Patient will return to functional activities including climbing stairs 100% of the time without increased symptoms or restriction. []? Progressing: []? Met: []? Not Met: []? Adjusted  4. Patient will return to walking for an entire shift without taking a break due to pain. []? Progressing: []? Met: []? Not Met: []? Adjusted         Overall Progression Towards Functional goals/ Treatment Progress Update:  [] Patient is progressing as expected towards functional goals listed. [] Progression is slowed due to complexities/Impairments listed. [] Progression has been slowed due to co-morbidities.   [x] Plan just implemented, too soon to assess goals progression <30days   [] Goals require adjustment due to lack of progress  [] Patient is not progressing as expected and requires additional follow up with physician  [] Other    Persisting Functional Limitations/Impairments:  []Sleeping []Sitting               []Standing []Transfers        [x]Walking []Kneeling               []Stairs []Squatting / bending   []ADLs []Reaching  []Lifting  []Housework  []Driving [x]Job related tasks  []Sports/Recreation []Other:        ASSESSMENT:  Pt needed max cueing for most strengthening activities for proper form, especially in [address]. Pt unable to maintain neutral pelvis in Qped, so adjusted to prone. Will re-visit Qped when core and proximal hip strength is better. Pt also able to perform prone quad stretch without cramping this date. Treatment/Activity Tolerance:  [x] Patient able to complete tx [] Patient limited by fatigue  [] Patient limited by pain  [] Patient limited by other medical complications  [] Other:     Prognosis: [x] Good [] Fair  [] Poor    Patient Requires Follow-up: [x] Yes  [] No    Plan for next treatment session: hip strengthening, quad flexibility     PLAN: See eval. PT 1x / week for 6 weeks. [x] Continue per plan of care [] Alter current plan (see comments)  [] Plan of care initiated [] Hold pending MD visit [] Discharge    Electronically signed by: Yvette Miles PT, DPT    Note: If patient does not return for scheduled/ recommended follow up visits, this note will serve as a discharge from care along with most recent update on progress.

## 2020-12-22 ENCOUNTER — HOSPITAL ENCOUNTER (OUTPATIENT)
Dept: PHYSICAL THERAPY | Age: 55
Setting detail: THERAPIES SERIES
Discharge: HOME | End: 2020-12-22
Payer: COMMERCIAL

## 2020-12-22 NOTE — PROGRESS NOTES
[address] [facility]    Physical Therapy  Cancellation/No-show Note  Patient Name:  Tatianna Negro  :  1965   Date:  2020    Cancelled visits to date: 1  No-shows to date: 0    For today's appointment patient:  [x]  Cancelled   []  Rescheduled appointment  []  No-show     Reason given by patient:  []  Patient ill  []  Conflicting appointment  []  No transportation    []  Conflict with work  []  No reason given  [x]  Other:     Comments: Pt's daughter is in labor     Phone call information:   []  Phone call made today to patient at _ time at number provided:      []  Patient answered, conversation as follows:    []  Patient did not answer, message left as follows:  []  Phone call not made today  [x]  Phone call not needed - pt contacted us to cancel and provided reason for cancellation.      Electronically signed by:  Jorge Palomares PT DPT

## 2020-12-28 ENCOUNTER — HOSPITAL ENCOUNTER (OUTPATIENT)
Dept: PHYSICAL THERAPY | Age: 55
Setting detail: THERAPIES SERIES
Discharge: HOME | End: 2020-12-28
Payer: COMMERCIAL

## 2020-12-28 PROCEDURE — 97112 NEUROMUSCULAR REEDUCATION: CPT

## 2020-12-28 PROCEDURE — 97110 THERAPEUTIC EXERCISES: CPT

## 2020-12-28 NOTE — FLOWSHEET NOTE
[address] [facility] Physical Therapy  Phone: [phone]   Fax: [phone]    Physical Therapy Daily Treatment Note  Date:  2020    Patient Name:  Abeba Mane    :  1965  MRN: [id number]  Medical/Treatment Diagnosis Information:  · Diagnosis: Primary osteoarthritis of right knee  · Treatment Diagnosis: R knee pain, lateral patellar tracking, weakness in posterior lateral hip  Insurance/Certification information:  PT Insurance Information: [employer] GUILLEN PPO (; telehealth allowed)  Physician Information:  Referring Practitioner: Lucero Farris MD   Plan of care signed (Y/N): [x]  Yes []  No     Date of Patient follow up with Physician:      Progress Report: []  Yes  [x]  No     Date Range for reporting period:  Beginnin20  Ending:     Progress report due (10 Rx/or 30 days whichever is less): visit #6 or  67     Recertification due (POC duration/ or 90 days whichever is less): visit #6 or 20     Visit # Insurance Allowable Auth required? Date Range   3/6 Medical necessity []  Yes  [x]  No n/a     Latex Allergy:  [x]NO      []YES  Preferred Language for Healthcare:   [x]English       []other:    Functional Scale:        Date assessed:  LEFS raw score = 65; dysfunction = 19%   20    Pain level:  210     SUBJECTIVE:  Pt reports she has not been able to do too much of her HEP because her daughter delivered twins last week and she has been busy helping her.      OBJECTIVE:  : Pt 7 min late    RESTRICTIONS/PRECAUTIONS: RA    Exercises/Interventions:     Therapeutic Exercises (42882) Resistance / level Sets/sec Reps Notes   Scifit L 1 5 min     IB - gastroc  HR  TR  IB - soleus  30 sec  1  1  30 sec   10  10    HSS  HFS  30 sec B  30 sec B     Quad stretching       Hip abd strength  · Clamshells    2   10 B   VCs for form   Prone:  · Hip ext - L  · HS curls  · Quad stretch with SOS    1  2  30 sec B   10 B  10 B   Cues for maintaining neutral pelvis   Supine  · Bridge  · Bridge with abd pulse    1  3 pulses   10  6                  Therapeutic Activities (28267)                                          Neuromuscular Re-ed (93929)       Gliders - lateral  1 10 B MAX cues for form    Gliders  - retro  1 10 B    Quadruped:  · Hip extension - R    Unable to maintain neutral pelvis                        Manual Intervention (33951)                                                     Pt. Education:  -patient educated on diagnosis, prognosis and expectations for rehab  -all patient questions were answered    Home Exercise Program:  Access Code: [email or web address]   URL: [employer]/   Date: 12/09/2020   Prepared by: Tuyet Cunningham     Exercises   · Prone Quadriceps Stretch with Strap - 3 reps - 1 sets - 30 seconds hold - 1x daily - 7x weekly   · Prone Hip Extension - 10 reps - 2 sets - 1x daily - 7x weekly   · Clamshell - 10 reps - 2 sets - 1x daily - 7x weekly       Therapeutic Exercise and NMR EXR  [x] (19360) Provided verbal/tactile cueing for activities related to strengthening, flexibility, endurance, ROM for improvements in  [x] LE / Lumbar: LE, proximal hip, and core control with self care, mobility, lifting, ambulation. [] UE / Cervical: cervical, postural, scapular, scapulothoracic and UE control with self care, reaching, carrying, lifting, house/yardwork, driving, computer work.  [] (68399) Provided verbal/tactile cueing for activities related to improving balance, coordination, kinesthetic sense, posture, motor skill, proprioception to assist with   [] LE / lumbar: LE, proximal hip, and core control in self care, mobility, lifting, ambulation and eccentric single leg control.    [] UE / cervical: cervical, scapular, scapulothoracic and UE control with self care, reaching, carrying, lifting, house/yardwork, driving, computer work.   [] (44083) Therapist is in constant attendance of 2 or more patients providing skilled therapy interventions, but not providing any significant amount of measurable one-on-one time to either patient, for improvements in  [] LE / lumbar: LE, proximal hip, and core control in self care, mobility, lifting, ambulation and eccentric single leg control. [] UE / cervical: cervical, scapular, scapulothoracic and UE control with self care, reaching, carrying, lifting, house/yardwork, driving, computer work. NMR and Therapeutic Activities:    [x] (91100 or 28534) Provided verbal/tactile cueing for activities related to improving balance, coordination, kinesthetic sense, posture, motor skill, proprioception and motor activation to allow for proper function of   [x] LE: / Lumbar core, proximal hip and LE with self care and ADLs  [] UE / Cervical: cervical, postural, scapular, scapulothoracic and UE control with self care, carrying, lifting, driving, computer work.   [] (10238) Gait Re-education- Provided training and instruction to the patient for proper LE, core and proximal hip recruitment and positioning and eccentric body weight control with ambulation re-education including up and down stairs     Home Management Training / Self Care:  [] (68366) Provided self-care/home management training related to activities of daily living and compensatory training, and/or use of adaptive equipment for improvement with: ADLs and compensatory training, meal preparation, safety procedures and instruction in use of adaptive equipment, including bathing, grooming, dressing, personal hygiene, basic household cleaning and chores.      Home Exercise Program:    [] (95295) Reviewed/Progressed HEP activities related to strengthening, flexibility, endurance, ROM of   [] LE / Lumbar: core, proximal hip and LE for functional self-care, mobility, lifting and ambulation/stair navigation   [] UE / Cervical: cervical, postural, scapular, scapulothoracic and UE control with self care, reaching, carrying, lifting, house/yardwork, driving, computer work  [] (17574)Reviewed/Progressed HEP activities related to improving balance, coordination, kinesthetic sense, posture, motor skill, proprioception of   [] LE: core, proximal hip and LE for self care, mobility, lifting, and ambulation/stair navigation    [] UE / Cervical: cervical, postural,  scapular, scapulothoracic and UE control with self care, reaching, carrying, lifting, house/yardwork, driving, computer work    Manual Treatments:  PROM / STM / Oscillations-Mobs:  G-I, II, III, IV (PA's, Inf., Post.)  [] (33532) Provided manual therapy to mobilize LE, proximal hip and/or LS spine soft tissue/joints for the purpose of modulating pain, promoting relaxation,  increasing ROM, reducing/eliminating soft tissue swelling/inflammation/restriction, improving soft tissue extensibility and allowing for proper ROM for normal function with   [] LE / lumbar: self care, mobility, lifting and ambulation. [] UE / Cervical: self care, reaching, carrying, lifting, house/yardwork, driving, computer work. Modalities:  [] (14175) Vasopneumatic compression: Utilized vasopneumatic compression to decrease edema / swelling for the purpose of improving mobility and quad tone / recruitment which will allow for increased overall function including but not limited to self-care, transfers, ambulation, and ascending / descending stairs. Modalities:      Charges:  Timed Code Treatment Minutes: 40   Total Treatment Minutes: 40     [] EVAL - LOW (16837)   [] EVAL - MOD (68000)  [] EVAL - HIGH (13546)  [] RE-EVAL (34590)  [x] CF(73155) x 2      [] Ionto  [x] NMR (02088) x   1    [] Vaso  [] Manual (10867) x       [] Ultrasound  [] TA x        [] Mech Traction (43894)  [] Aquatic Therapy x     [] ES (un) (16819):   [] Home Management Training x  [] ES(attended) (96694)   [] Dry Needling 1-2 muscles (46086):  [] Dry Needling 3+ muscles (561974)  [] Group:      [] Other:     GOALS:   Patient stated goal: Less knee pain   []?  Progressing: []? Met: []? Not Met: []? Adjusted     Therapist goals for Patient:   Short Term Goals: To be achieved in: 2 weeks  1. Independent in HEP and progression per patient tolerance, in order to prevent re-injury. []? Progressing: []? Met: []? Not Met: []? Adjusted  2. Patient will have a decrease in pain to facilitate improvement in movement, function, and ADLs as indicated by Functional Deficits. []? Progressing: []? Met: []? Not Met: []? Adjusted     Long Term Goals: To be achieved in: 6 weeks  1. Disability index score of 10% or less for the LEFS to assist with reaching prior level of function. []? Progressing: []? Met: []? Not Met: []? Adjusted  2. Patient will demonstrate an increase in Strength to at least 5/5 as well as good proximal hip strength and control to allow for proper functional mobility as indicated by patients Functional Deficits. []? Progressing: []? Met: []? Not Met: []? Adjusted  3. Patient will return to functional activities including climbing stairs 100% of the time without increased symptoms or restriction. []? Progressing: []? Met: []? Not Met: []? Adjusted  4. Patient will return to walking for an entire shift without taking a break due to pain. []? Progressing: []? Met: []? Not Met: []? Adjusted         Overall Progression Towards Functional goals/ Treatment Progress Update:  [] Patient is progressing as expected towards functional goals listed. [] Progression is slowed due to complexities/Impairments listed. [] Progression has been slowed due to co-morbidities.   [x] Plan just implemented, too soon to assess goals progression <30days   [] Goals require adjustment due to lack of progress  [] Patient is not progressing as expected and requires additional follow up with physician  [] Other    Persisting Functional Limitations/Impairments:  []Sleeping []Sitting               []Standing []Transfers        [x]Walking []Kneeling               []Stairs []Squatting / bending   []ADLs []Reaching  []Lifting  []Housework  []Driving [x]Job related tasks  []Sports/Recreation []Other:        ASSESSMENT: Improved form with gliders; only required min-mod cueing. Pt with less pain overall during the session and only reporting soreness afterwards. Encouraged to trial mat exercises at home if she has time. Will continue with hip strengthening and LE flexibility. Treatment/Activity Tolerance:  [x] Patient able to complete tx [] Patient limited by fatigue  [] Patient limited by pain  [] Patient limited by other medical complications  [] Other:     Prognosis: [x] Good [] Fair  [] Poor    Patient Requires Follow-up: [x] Yes  [] No    Plan for next treatment session: hip strengthening, quad flexibility     PLAN: See eval. PT 1x / week for 6 weeks. [x] Continue per plan of care [] Alter current plan (see comments)  [] Plan of care initiated [] Hold pending MD visit [] Discharge    Electronically signed by: Harpreet Teran PT, DPT    Note: If patient does not return for scheduled/ recommended follow up visits, this note will serve as a discharge from care along with most recent update on progress.

## 2020-12-29 ENCOUNTER — OFFICE VISIT (OUTPATIENT)
Dept: RHEUMATOLOGY | Age: 55
End: 2020-12-29
Payer: COMMERCIAL

## 2020-12-29 VITALS
HEIGHT: 62 IN | DIASTOLIC BLOOD PRESSURE: 79 MMHG | HEART RATE: 82 BPM | TEMPERATURE: 96.9 F | SYSTOLIC BLOOD PRESSURE: 127 MMHG | WEIGHT: 197.2 LBS | BODY MASS INDEX: 36.29 KG/M2

## 2020-12-29 PROCEDURE — 99213 OFFICE O/P EST LOW 20 MIN: CPT | Performed by: INTERNAL MEDICINE

## 2020-12-29 NOTE — PROGRESS NOTES
2020  Patient Name: Sanya Escobar  : 1965  Medical Record: [id number]    MEDICATIONS  Current Outpatient Medications   Medication Sig Dispense Refill    meloxicam (MOBIC) 15 MG tablet TK 1 T PO QD 30 tablet 5    Etanercept (ENBREL MINI) 50 MG/ML SOCT Inject 50 mg into the skin once a week 4 Cartridge 5     No current facility-administered medications for this visit. ALLERGIES  No Known Allergies      Comments  No specialty comments available. Background history:  Sanya Escobar is a 48 y.o. female who is being seen for follow up evaluation of Joint pain. She is complaining of pain in hands, left hip and left knee. Pain in the hands started 2 years ago and is progressively getting worse. She describes her pain as constant, aching, 5-6 out of 10 without any significant relieving or aggravating factors. Pain is located in PIP, MCP, DIP joints. Pain in the left hip is located on the lateral side and gets worse on laying on the left side. Knees crack and pop. Knee pain gets worse going up and down the steps. She has swelling in the knuckles, stiffness throughout the day. She also has difficulty opening doorknobs and jar cans. She is on meloxicam 7.5 mg daily without improvement. She denies any history of psoriasis, inflammatory bowel disease, inflammatory back pain, uveitis, enthesitis, tenosynovitis or dactylitis. Interim history: She presents for follow-up of Osteoarthritis and rheumatoid arthritis. She is on Enbrel 50 mg once a week and meloxicam 15 mg daily. She is here to fill her [employer] paperwork for her job. She denies any other joint pain or swelling or stiffness. She denies any morning stiffness. He denies any side effects with Enbrel. She denies any recent fevers or infections. She is off of methotrexate. She did not tolerate sulfasalazine [developed nausea, abdominal pain and diarrhea]. She did not tolerate Arava either [hair loss]. X-rays of the hands consistent with erosive osteoarthritis. Knee x-rays mild degenerative changes. Blood work shows low titer positive CCP, elevated ESR and CRP. Knee Pain       ROS    REVIEW OF SYSTEMS:   Constitutional: No unanticipated weight loss or fevers. No fatigue and malaise. Integumentary: No photosensitivity, malar rash, livedo reticularis, alopecia and Raynaud's symptoms, sclerodactyly, skin tightening  Eyes: negative for dry eyes, visual disturbance and persistent redness, discharge from eyes   ENT: - No tinnitus, loss of hearing, vertigo, or recurrent ear infections.  - No history of nasal/oral ulcers. - No history of sicca symptoms. Cardiovascular: No history of pericarditis, chest pain or murmur or palpitations  Respiratory: No shortness of breath, cough or history of interstitial lung disease. No history of pleurisy. No history of tuberculosis or atypical infections. Gastrointestinal: No history of dysphagia or esophageal dysmotility. No change in bowel habits or any inflammatory bowel disease. Genitourinary: No history renal disease, miscarriages. Hematologic/Lymphatic: No  bleeding, blood clots or swollen lymph nodes. Neurological: No history seizure or focal weakness.  No history of neuropathies, paresthesias or hyperesthesias, facial droop, diplopia  Psychiatric: No history of bipolar disease, anxiety, depression Endocrine: Denies any thyroid / parathyroid disease and osteoporosis  Allergic/Immunologic: No nasal congestion         I have reviewed patients Past medical History, Social History and Family History as mentioned in her chart and this remains unchanged from previous.     Past Medical History:   Diagnosis Date    Anemia     Fibroids     Rheumatoid arthritis ([facility] [address]      Past Surgical History:   Procedure Laterality Date    HYSTERECTOMY  10/2013    TLH, lso    TOOTH EXTRACTION  04/2018     Social History     Socioeconomic History    Marital status: Single     Spouse name: Not on file    Number of children: Not on file    Years of education: Not on file    Highest education level: Not on file   Occupational History    Not on file   Social Needs    Financial resource strain: Not on file    Food insecurity     Worry: Not on file     Inability: Not on file    Transportation needs     Medical: Not on file     Non-medical: Not on file   Tobacco Use    Smoking status: Never Smoker    Smokeless tobacco: Never Used   Substance and Sexual Activity    Alcohol use: Yes     Comment: RARELY    Drug use: No    Sexual activity: Yes     Partners: Male   Lifestyle    Physical activity     Days per week: Not on file     Minutes per session: Not on file    Stress: Not on file   Relationships    Social connections     Talks on phone: Not on file     Gets together: Not on file     Attends Gnosticism service: Not on file     Active member of club or organization: Not on file     Attends meetings of clubs or organizations: Not on file     Relationship status: Not on file    Intimate partner violence     Fear of current or ex partner: Not on file     Emotionally abused: Not on file     Physically abused: Not on file     Forced sexual activity: Not on file   Other Topics Concern    Not on file   Social History Narrative    Not on file     Family History   Problem Relation Age of Onset    Diabetes Mother  Hypertension Mother     Heart Failure Mother     Heart Attack Mother     Diabetes Father     Cancer Sister         colon    Rheum Arthritis Neg Hx     Osteoarthritis Neg Hx     Asthma Neg Hx     Breast Cancer Neg Hx     High Cholesterol Neg Hx     Migraines Neg Hx     Ovarian Cancer Neg Hx     Rashes/Skin Problems Neg Hx     Seizures Neg Hx     Stroke Neg Hx     Thyroid Disease Neg Hx          PHYSICAL EXAM   Vitals:    12/29/20 0948   BP: 127/79   Pulse: 82   Temp: 96.9 °F (36.1 °C)   Weight: 197 lb 3.2 oz (89.4 kg)   Height: 5' 2\" (1.575 m)     Physical Exam  Constitutional:  Well developed, well nourished, no acute distress, non-toxic appearance   Musculoskeletal:    RIGHT  Swell  Tender  ROM  LEFT  Swell  Tender  ROM    DIP2  0  0  Heberden   0  0  Heberden    DIP3  0  0  Heberden   0  0  Heberden    DIP4  0  0  Heberden   0  0  Heberden    DIP5  0  0  Heberden   0  0  Heberden    PIP1  + 0 Bony change   + 0 Bony change    PIP2  + 0 Bony change   + 0 Bony change    PIP3  + 0 Bony change   + 0 Bony change    PIP4  + 0 Bony change   ++ 0 Bony change    PIP5  + 0 Bony change   0 0 Bony change    MCP1  0  0  FULL   0  0  FULL    MCP2  0  0  FULL   0  0  FULL    MCP3  0  0  FULL   0  0  FULL    MCP4  0  0  FULL   0  0  FULL    MCP5  0  0  FULL   0  0  FULL    Wrist  0 0 FULL   0  0  FULL    Elbow  0  0  FULL   0  0  FULL    Shouldr  0  0  FULL   0  0  FULL    Hip  0  0  FULL   0  + FULL    Knee  + + Crepitus   0 + Crepitus    Ankle  0  0  FULL   0  0  FULL    MTP1  0  0  FULL   0  0  FULL    MTP2  0  0  FULL   0  0  FULL    MTP3  0  0  FULL   0  0  FULL    MTP4  0  0  FULL   0  0  FULL    MTP5  0  0  FULL   0  0  FULL    IP1  0  0  FULL   0  0  FULL    IP2  0  0  FULL   0  0  FULL    IP3  0  0  FULL   0  0  FULL    IP4  0  0  FULL   0  0  FULL    IP5  0  0  FULL   0 0 FULL     Ambulates without assistance, normal gait  Neck: Full ROM, no tenderness, supple   Back- no tenderness. Eyes:  PERRL, extra ocular movements intact, conjunctiva normal   HEENT:  Atraumatic, normocephalic, external ears normal, oropharynx moist, no pharyngeal exudates. Respiratory:  No respiratory distress  GI:  Soft, nondistended, normal bowel sounds, nontender, no organomegaly, no mass, no rebound, no guarding   :  No costovertebral angle tenderness   Integument:  Well hydrated, no telangiectasias  Lymphatic:  No lymphadenopathy noted   Neurologic:   Alert & oriented x 3, CN 2-12 normal, no focal deficits noted. Sensations Intact. Muscle strength 5/5 proximally and distally in upper and lower extremities.    Psychiatric:  Speech and behavior appropriate           LABS AND IMAGING  Outside data reviewed and in HPI    Lab Results   Component Value Date    WBC 6.0 11/30/2020    RBC 4.34 11/30/2020    HGB 12.5 11/30/2020    HCT 38.3 11/30/2020     11/30/2020    MCV 88.3 11/30/2020    MCH 28.8 11/30/2020    MCHC 32.6 11/30/2020    RDW 14.7 11/30/2020    NRBC 1 05/26/2020    NRBC 1 05/26/2020    SEGSPCT 34 09/02/2017    LYMPHOPCT 48.2 11/30/2020    MONOPCT 5.0 11/30/2020    BASOPCT 0.5 11/30/2020    MONOSABS 0.3 11/30/2020    LYMPHSABS 2.9 11/30/2020    EOSABS 0.1 11/30/2020    BASOSABS 0.0 11/30/2020       Chemistry        Component Value Date/Time     03/04/2020 0501    K 3.5 03/04/2020 0501     03/04/2020 0501    CO2 26 03/04/2020 0501    BUN 16 03/04/2020 0501    CREATININE 0.6 11/30/2020 0928        Component Value Date/Time    CALCIUM 9.2 03/04/2020 0501    ALKPHOS 111 03/04/2020 0501    AST 19 11/30/2020 0928    ALT 20 11/30/2020 0928    BILITOT <0.2 03/04/2020 0501          Lab Results   Component Value Date    SEDRATE 43 (H) 02/24/2020     Lab Results   Component Value Date    CRP 18.6 (H) 02/24/2020     No results found for: JADEN, SHANIQUE, SSA, SSB, C3, C4  Lab Results   Component Value Date    RF <10.0 03/16/2018    CCPABIGG 26 03/16/2018     No results found for: JADEN, [address] [facility], JANET, PATH No results found for: [facility], DSDNAIGGIFA  Lab Results   Component Value Date    SSALAAB 0 03/16/2018     No results found for: SMAB, RNPAB  No results found for: CENTABIGG  No results found for: C3, C4, ACE  No results found for: JO1, VITD25, [id number]    ASSESSMENT AND PLAN      Assessment/Plan:      ASSESSMENT:    1. Disability examination    2. Rheumatoid arthritis of multiple sites with negative rheumatoid factor ([facility] [address]    3. Primary osteoarthritis involving multiple joints    4. Maintenance chemotherapy        PLAN:    Diagnosis Orders   1. Disability examination     2. Rheumatoid arthritis of multiple sites with negative rheumatoid factor ([address]     3. Primary osteoarthritis involving multiple joints     4. Maintenance chemotherapy       1. Disability examination  Disability paperwork [FMLA] was filled for her job    2. Rheumatoid arthritis of multiple sites with negative rheumatoid factor (HCC)  Stable. Continue Enbrel 50 mg once a week    3. Primary osteoarthritis involving multiple joints  Stable. Continue meloxicam 15 mg daily    4. Maintenance chemotherapy  Labs reviewed      The patient indicates understanding of these issues and agrees with the plan. Return in about 2 months (around 3/1/2021). The risks and benefits of my recommendations, as well as other treatment options, benefits and side effects were discussed with the patient. All questions were answered. ######################################################################    I thank you for giving me the opportunity to participate in [facility]. If you have any questions or concerns please feel free to contact me. I look forward to following  Norma Larry along with you. Electronically signed by: Yelena Ponce MD, 12/29/2020 10:14 AM    Documentation was done using voice recognition dragon software. Every effort was made to ensure accuracy; however, inadvertent unintentional computerized transcription errors may be present.

## 2021-01-04 ENCOUNTER — HOSPITAL ENCOUNTER (OUTPATIENT)
Dept: PHYSICAL THERAPY | Age: 56
Setting detail: THERAPIES SERIES
Discharge: HOME | End: 2021-01-04
Payer: COMMERCIAL

## 2021-01-04 PROCEDURE — G0283 ELEC STIM OTHER THAN WOUND: HCPCS

## 2021-01-04 PROCEDURE — 97110 THERAPEUTIC EXERCISES: CPT

## 2021-01-11 ENCOUNTER — HOSPITAL ENCOUNTER (OUTPATIENT)
Dept: PHYSICAL THERAPY | Age: 56
Setting detail: THERAPIES SERIES
Discharge: HOME | End: 2021-01-11
Payer: COMMERCIAL

## 2021-01-15 ENCOUNTER — TELEPHONE (OUTPATIENT)
Dept: GYNECOLOGY | Age: 56
End: 2021-01-15

## 2021-01-15 NOTE — TELEPHONE ENCOUNTER
Called and spoke to patient, patient states that she has been having vaginal odor along with vaginal itching and discharge for 3-4 days. She is also having lower back pain that has been onset for 3-4 days. Patient has not spoken to her PCP in regarding to her back pain but she does plans on reaching out to her PCP.        Patients RX is [facility] [address], [address] [address] - F [phone]

## 2021-01-15 NOTE — TELEPHONE ENCOUNTER
The following message was left on the Non-Emergency Line:    Patient is requesting to speak with the MA regarding symptoms of odor, and back pain she is having. She provided no additional information and can be reached @ phone # provided.

## 2021-01-15 NOTE — TELEPHONE ENCOUNTER
Tell patient I have a 2:20 pm or 3:00 pm appointment on 1/19/21. I have not seen patient in over 3 years so I cannot call anything in. She needs to be seen.

## 2021-01-28 NOTE — TELEPHONE ENCOUNTER
Called left message on voice mail if she still needs to be seen she will need to call us back to schedule a new patient appt, will close this today, because she has not reached back out to us DONE

## 2021-02-01 ENCOUNTER — OFFICE VISIT (OUTPATIENT)
Dept: FAMILY MEDICINE CLINIC | Age: 56
End: 2021-02-01
Payer: COMMERCIAL

## 2021-02-01 VITALS — TEMPERATURE: 97.5 F | OXYGEN SATURATION: 98 % | HEART RATE: 83 BPM

## 2021-02-01 DIAGNOSIS — B96.89 ACUTE BACTERIAL SINUSITIS: Primary | ICD-10-CM

## 2021-02-01 DIAGNOSIS — J01.90 ACUTE BACTERIAL SINUSITIS: Primary | ICD-10-CM

## 2021-02-01 PROCEDURE — 99213 OFFICE O/P EST LOW 20 MIN: CPT | Performed by: FAMILY MEDICINE

## 2021-02-01 RX ORDER — AMOXICILLIN 500 MG/1
1000 CAPSULE ORAL 2 TIMES DAILY
Qty: 28 CAPSULE | Refills: 0 | Status: SHIPPED | OUTPATIENT
Start: 2021-02-01 | End: 2021-02-08

## 2021-02-01 NOTE — LETTER
[address] [place] [address]  Phone: [phone]  Fax: [phone]    Karina Griffith MD        February 1, 2021     Patient: Arun Marx   YOB: 1965   Date of Visit: 2/1/2021       To Whom it May Concern:    Vida Crowell was seen in my clinic on 2/1/2021. She may return to work on 2-2-2021. If you have any questions or concerns, please don't hesitate to call.     Sincerely,         Karina Griffith MD

## 2021-02-02 ENCOUNTER — OFFICE VISIT (OUTPATIENT)
Dept: PRIMARY CARE CLINIC | Age: 56
End: 2021-02-02
Payer: COMMERCIAL

## 2021-02-02 DIAGNOSIS — Z20.822 SUSPECTED COVID-19 VIRUS INFECTION: Primary | ICD-10-CM

## 2021-02-02 PROCEDURE — 99211 OFF/OP EST MAY X REQ PHY/QHP: CPT | Performed by: NURSE PRACTITIONER

## 2021-02-02 ASSESSMENT — PATIENT HEALTH QUESTIONNAIRE - PHQ9
SUM OF ALL RESPONSES TO PHQ QUESTIONS 1-9: 0
1. LITTLE INTEREST OR PLEASURE IN DOING THINGS: 0
2. FEELING DOWN, DEPRESSED OR HOPELESS: 0

## 2021-02-02 NOTE — PROGRESS NOTES
Yossi Davila received a viral test for COVID-19. They were educated on isolation and quarantine as appropriate. For any symptoms, they were directed to seek care from their PCP, given contact information to establish with a doctor, directed to an urgent care or the emergency room.

## 2021-02-02 NOTE — PROGRESS NOTES
Subjective:      Patient ID: Hai Kingsley is a 48 y.o. female. HPI presents with a 1 week history of congestion and headaches. Patient [place] any fevers or chills. She started have purulent nasal drainage at this time. She has had no Covid exposures. She does have a history of similar symptoms almost every winter. Review of Systems  No Known Allergies  Vitals:    02/01/21 1547   Pulse: 83   Temp: 97.5 °F (36.4 °C)   SpO2: 98%       Objective:   Physical Exam  Constitutional:       General: She is not in acute distress. Appearance: She is well-developed. HENT:      Right Ear: Tympanic membrane and ear canal normal.      Left Ear: Tympanic membrane and ear canal normal.      Nose: Mucosal edema and rhinorrhea (purulent) present. Right Sinus: Frontal sinus tenderness present. No maxillary sinus tenderness. Left Sinus: Frontal sinus tenderness present. No maxillary sinus tenderness. Mouth/Throat:      Mouth: Mucous membranes are moist.      Pharynx: Oropharynx is clear. Pulmonary:      Effort: Pulmonary effort is normal.      Breath sounds: Normal breath sounds. Lymphadenopathy:      Cervical: No cervical adenopathy. Neurological:      Mental Status: She is alert. Psychiatric:         Behavior: Behavior is cooperative. Assessment:      Miguelito Terry was seen today for congestion. Diagnoses and all orders for this visit:    Acute bacterial sinusitis    Other orders  -     amoxicillin (AMOXIL) 500 MG capsule; Take 2 capsules by mouth 2 times daily for 7 days            Plan:      Humidification of the bedroom was discussed.   Maintain over-the-counter medication when necessary  RTC when necessary    Medical decision making of low complexity            Ascencion Ford MD

## 2021-02-03 LAB — SARS-COV-2, NAA: NOT DETECTED

## 2021-03-01 ENCOUNTER — OFFICE VISIT (OUTPATIENT)
Dept: RHEUMATOLOGY | Age: 56
End: 2021-03-01
Payer: COMMERCIAL

## 2021-03-01 VITALS
HEART RATE: 81 BPM | HEIGHT: 62 IN | WEIGHT: 199.6 LBS | BODY MASS INDEX: 36.73 KG/M2 | TEMPERATURE: 96.2 F | SYSTOLIC BLOOD PRESSURE: 167 MMHG | DIASTOLIC BLOOD PRESSURE: 84 MMHG

## 2021-03-01 DIAGNOSIS — R39.9 UTI SYMPTOMS: ICD-10-CM

## 2021-03-01 DIAGNOSIS — Z51.11 MAINTENANCE CHEMOTHERAPY: ICD-10-CM

## 2021-03-01 DIAGNOSIS — M06.09 RHEUMATOID ARTHRITIS OF MULTIPLE SITES WITH NEGATIVE RHEUMATOID FACTOR (HCC): Primary | ICD-10-CM

## 2021-03-01 DIAGNOSIS — M72.2 PLANTAR FASCIITIS: ICD-10-CM

## 2021-03-01 DIAGNOSIS — M15.9 PRIMARY OSTEOARTHRITIS INVOLVING MULTIPLE JOINTS: ICD-10-CM

## 2021-03-01 LAB
A/G RATIO: 1.4 (ref 1.1–2.2)
ALBUMIN SERPL-MCNC: 4.2 G/DL (ref 3.4–5)
ALP BLD-CCNC: 118 U/L (ref 40–129)
ALT SERPL-CCNC: 18 U/L (ref 10–40)
ANION GAP SERPL CALCULATED.3IONS-SCNC: 9 MMOL/L (ref 3–16)
AST SERPL-CCNC: 19 U/L (ref 15–37)
BASOPHILS ABSOLUTE: 0 K/UL (ref 0–0.2)
BASOPHILS RELATIVE PERCENT: 0.5 %
BILIRUB SERPL-MCNC: <0.2 MG/DL (ref 0–1)
BILIRUBIN URINE: NEGATIVE
BLOOD, URINE: ABNORMAL
BUN BLDV-MCNC: 11 MG/DL (ref 7–20)
CALCIUM SERPL-MCNC: 10 MG/DL (ref 8.3–10.6)
CHLORIDE BLD-SCNC: 103 MMOL/L (ref 99–110)
CLARITY: CLEAR
CO2: 29 MMOL/L (ref 21–32)
COLOR: YELLOW
CREAT SERPL-MCNC: 0.5 MG/DL (ref 0.6–1.1)
EOSINOPHILS ABSOLUTE: 0.1 K/UL (ref 0–0.6)
EOSINOPHILS RELATIVE PERCENT: 2.4 %
EPITHELIAL CELLS, UA: 2 /HPF (ref 0–5)
GFR AFRICAN AMERICAN: >60
GFR NON-AFRICAN AMERICAN: >60
GLOBULIN: 3 G/DL
GLUCOSE BLD-MCNC: 101 MG/DL (ref 70–99)
GLUCOSE URINE: NEGATIVE MG/DL
HCT VFR BLD CALC: 36.3 % (ref 36–48)
HEMOGLOBIN: 12 G/DL (ref 12–16)
HYALINE CASTS: 2 /LPF (ref 0–8)
KETONES, URINE: NEGATIVE MG/DL
LEUKOCYTE ESTERASE, URINE: NEGATIVE
LYMPHOCYTES ABSOLUTE: 3.3 K/UL (ref 1–5.1)
LYMPHOCYTES RELATIVE PERCENT: 58.5 %
MCH RBC QN AUTO: 28.7 PG (ref 26–34)
MCHC RBC AUTO-ENTMCNC: 33 G/DL (ref 31–36)
MCV RBC AUTO: 86.9 FL (ref 80–100)
MICROSCOPIC EXAMINATION: YES
MONOCYTES ABSOLUTE: 0.3 K/UL (ref 0–1.3)
MONOCYTES RELATIVE PERCENT: 5.7 %
NEUTROPHILS ABSOLUTE: 1.8 K/UL (ref 1.7–7.7)
NEUTROPHILS RELATIVE PERCENT: 32.9 %
NITRITE, URINE: NEGATIVE
PDW BLD-RTO: 15.1 % (ref 12.4–15.4)
PH UA: 5.5 (ref 5–8)
PLATELET # BLD: 275 K/UL (ref 135–450)
PMV BLD AUTO: 9.2 FL (ref 5–10.5)
POTASSIUM SERPL-SCNC: 4.1 MMOL/L (ref 3.5–5.1)
PROTEIN UA: NEGATIVE MG/DL
RBC # BLD: 4.18 M/UL (ref 4–5.2)
RBC UA: 2 /HPF (ref 0–4)
SODIUM BLD-SCNC: 141 MMOL/L (ref 136–145)
SPECIFIC GRAVITY UA: 1.03 (ref 1–1.03)
TOTAL PROTEIN: 7.2 G/DL (ref 6.4–8.2)
URINE REFLEX TO CULTURE: ABNORMAL
URINE TYPE: ABNORMAL
UROBILINOGEN, URINE: 0.2 E.U./DL
WBC # BLD: 5.6 K/UL (ref 4–11)
WBC UA: 3 /HPF (ref 0–5)

## 2021-03-01 PROCEDURE — 20610 DRAIN/INJ JOINT/BURSA W/O US: CPT | Performed by: INTERNAL MEDICINE

## 2021-03-01 PROCEDURE — 99214 OFFICE O/P EST MOD 30 MIN: CPT | Performed by: INTERNAL MEDICINE

## 2021-03-01 RX ORDER — PREDNISONE 1 MG/1
TABLET ORAL
Qty: 40 TABLET | Refills: 0 | Status: SHIPPED | OUTPATIENT
Start: 2021-03-01 | End: 2021-06-07 | Stop reason: SDUPTHER

## 2021-03-01 RX ORDER — LIDOCAINE HYDROCHLORIDE 10 MG/ML
2 INJECTION, SOLUTION EPIDURAL; INFILTRATION; INTRACAUDAL; PERINEURAL ONCE
Status: SHIPPED | OUTPATIENT
Start: 2021-03-01

## 2021-03-01 RX ORDER — TRIAMCINOLONE ACETONIDE 40 MG/ML
80 INJECTION, SUSPENSION INTRA-ARTICULAR; INTRAMUSCULAR ONCE
Status: SHIPPED | OUTPATIENT
Start: 2021-03-01

## 2021-03-01 NOTE — PROGRESS NOTES
3/1/2021  Patient Name: Laisha Carbajal  : 1965  Medical Record: [id number]    MEDICATIONS  Current Outpatient Medications   Medication Sig Dispense Refill    predniSONE (DELTASONE) 5 MG tablet Take 4 tabs daily for 4 days, 3 tabs daily for 4 days, 2 tabs daily for 4 days, 1 tab daily for 4 days and stop 40 tablet 0    meloxicam (MOBIC) 15 MG tablet TK 1 T PO QD 30 tablet 5    Etanercept (ENBREL MINI) 50 MG/ML SOCT Inject 50 mg into the skin once a week 4 Cartridge 5     No current facility-administered medications for this visit. ALLERGIES  No Known Allergies      Comments  No specialty comments available. Background history:  Laisha Carbajal is a 55 y.o. female who is being seen for follow up evaluation of Joint pain. She is complaining of pain in hands, left hip and left knee. Pain in the hands started 2 years ago and is progressively getting worse. She describes her pain as constant, aching, 5-6 out of 10 without any significant relieving or aggravating factors. Pain is located in PIP, MCP, DIP joints. Pain in the left hip is located on the lateral side and gets worse on laying on the left side. Knees crack and pop. Knee pain gets worse going up and down the steps. She has swelling in the knuckles, stiffness throughout the day. She also has difficulty opening doorknobs and jar cans. She is on meloxicam 7.5 mg daily without improvement. She denies any history of psoriasis, inflammatory bowel disease, inflammatory back pain, uveitis, enthesitis, tenosynovitis or dactylitis. Interim history: She presents for follow-up of Osteoarthritis and rheumatoid arthritis. She is off of Enbrel for past 2 months due to urinary tract symptoms. She is scheduled to see GYN. She also has vaginal discharge. She denies any fever. She has burning urine. She denies any frequency or urgency or suprapubic pain.   Joint symptoms have worsened off of Enbrel especially in the right foot in 2 3 and 4 MTP joint. She also has pain and stiffness and swelling in the left for PIP joint. She has pain in the bottom of the left foot which gets worse in the morning after taking a full step. She was saw orthopedic surgeon for bilateral knee osteoarthritis. She has moderate to severe osteoarthritis in the right knee. She had steroid injection in the right knee that lasted for only 1 month. She was advised to get viscosupplementation which was approved but she has not gotten it yet. She is requesting a corticosteroid injection in the left knee which helped in the past.       She denies any other joint pain or swelling or stiffness. She denies any morning stiffness. He denies any side effects with Enbrel. She is off of methotrexate. She did not tolerate sulfasalazine [developed nausea, abdominal pain and diarrhea]. She did not tolerate Arava either [hair loss]. X-rays of the hands consistent with erosive osteoarthritis. Knee x-rays mild degenerative changes. Blood work shows low titer positive CCP, elevated ESR and CRP. Knee Pain       ROS    REVIEW OF SYSTEMS:   Constitutional: No unanticipated weight loss or fevers. No fatigue and malaise. Integumentary: No photosensitivity, malar rash, livedo reticularis, alopecia and Raynaud's symptoms, sclerodactyly, skin tightening  Eyes: negative for dry eyes, visual disturbance and persistent redness, discharge from eyes   ENT: - No tinnitus, loss of hearing, vertigo, or recurrent ear infections.  - No history of nasal/oral ulcers. - No history of sicca symptoms. Cardiovascular: No history of pericarditis, chest pain or murmur or palpitations  Respiratory: No shortness of breath, cough or history of interstitial lung disease. No history of pleurisy. No history of tuberculosis or atypical infections. Gastrointestinal: No history of dysphagia or esophageal dysmotility. No change in bowel habits or any inflammatory bowel disease.   Genitourinary: No history renal disease, miscarriages. Hematologic/Lymphatic: No  bleeding, blood clots or swollen lymph nodes. Neurological: No history seizure or focal weakness. No history of neuropathies, paresthesias or hyperesthesias, facial droop, diplopia  Psychiatric: No history of bipolar disease, anxiety, depression  Endocrine: Denies any thyroid / parathyroid disease and osteoporosis  Allergic/Immunologic: No nasal congestion         I have reviewed patients Past medical History, Social History and Family History as mentioned in her chart and this remains unchanged from previous.     Past Medical History:   Diagnosis Date    Anemia     Fibroids     Rheumatoid arthritis ([facility] [address]      Past Surgical History:   Procedure Laterality Date    HYSTERECTOMY  10/2013    [facility], lso    TOOTH EXTRACTION  04/2018     Social History     Socioeconomic History    Marital status: Single     Spouse name: Not on file    Number of children: Not on file    Years of education: Not on file    Highest education level: Not on file   Occupational History    Not on file   Social Needs    Financial resource strain: Not on file    Food insecurity     Worry: Not on file     Inability: Not on file    Transportation needs     Medical: Not on file     Non-medical: Not on file   Tobacco Use    Smoking status: Never Smoker    Smokeless tobacco: Never Used   Substance and Sexual Activity    Alcohol use: Yes     Comment: RARELY    Drug use: No    Sexual activity: Yes     Partners: Male   Lifestyle    Physical activity     Days per week: Not on file     Minutes per session: Not on file    Stress: Not on file   Relationships    Social connections     Talks on phone: Not on file     Gets together: Not on file     Attends Taoist service: Not on file     Active member of club or organization: Not on file     Attends meetings of clubs or organizations: Not on file     Relationship status: Not on file    Intimate partner violence     Fear of current or ex partner: Not on file     Emotionally abused: Not on file     Physically abused: Not on file     Forced sexual activity: Not on file   Other Topics Concern    Not on file   Social History Narrative    Not on file     Family History   Problem Relation Age of Onset    Diabetes Mother     Hypertension Mother     Heart Failure Mother     Heart Attack Mother     Diabetes Father     Cancer Sister         colon    Rheum Arthritis Neg Hx     Osteoarthritis Neg Hx     Asthma Neg Hx     Breast Cancer Neg Hx     High Cholesterol Neg Hx     Migraines Neg Hx     Ovarian Cancer Neg Hx     Rashes/Skin Problems Neg Hx     Seizures Neg Hx     Stroke Neg Hx     Thyroid Disease Neg Hx          PHYSICAL EXAM   Vitals:    03/01/21 0953   BP: (!) 167/84   Pulse: 81   Temp: 96.2 °F (35.7 °C)   TempSrc: Temporal   Weight: 199 lb 9.6 oz (90.5 kg)   Height: 5' 2\" (1.575 m)     Physical Exam  Constitutional:  Well developed, well nourished, no acute distress, non-toxic appearance   Musculoskeletal:    RIGHT  Swell  Tender  ROM  LEFT  Swell  Tender  ROM    DIP2  0  0  [place]berden   0  0  Heberden    DIP3  0  0  Heberden   0  0  Heberden    DIP4  0  0  Heberden   0  0  Heberden    DIP5  0  0  Heberden   0  0  Heberden    PIP1  + 0 Bony change   + 0 Bony change    PIP2  + 0 Bony change   + 0 Bony change    PIP3  + 0 Bony change   + 0 Bony change    PIP4  + 0 Bony change   ++ + Bony change    PIP5  + 0 Bony change   0 0 Bony change    MCP1  0  0  FULL   0  0  FULL    MCP2  0  0  FULL   0  0  FULL    MCP3  0  0  FULL   0  0  FULL    MCP4  0  0  FULL   0  0  FULL    MCP5  0  0  FULL   0  0  FULL    Wrist  0 0 FULL   0  0  FULL    Elbow  0  0  FULL   0  0  FULL    Shouldr  0  0  FULL   0  0  FULL    Hip  0  0  FULL   0  + FULL    Knee  + + Crepitus   0 + Crepitus    Ankle  0  0  FULL   0  0  FULL    MTP1  0  0  FULL   0  0  FULL    MTP2  0  0  FULL   0  0  FULL    MTP3  0  0  FULL   0  0  FULL    MTP4  0  0  FULL   0  0 FULL    MTP5  0  0  FULL   0  0  FULL    IP1  0  0  FULL   0  0  FULL    IP2  0  0  FULL   0  0  FULL    IP3  0  0  FULL   0  0  FULL    IP4  0  0  FULL   0  0  FULL    IP5  0  0  FULL   0 0 FULL     Ambulates without assistance, normal gait  Neck: Full ROM, no tenderness, supple   Back- no tenderness. Eyes:  PERRL, extra ocular movements intact, conjunctiva normal   HEENT:  Atraumatic, normocephalic, external ears normal, oropharynx moist, no pharyngeal exudates. Respiratory:  No respiratory distress  GI:  Soft, nondistended, normal bowel sounds, nontender, no organomegaly, no mass, no rebound, no guarding   :  No costovertebral angle tenderness   Integument:  Well hydrated, no telangiectasias  Lymphatic:  No lymphadenopathy noted   Neurologic:   Alert & oriented x 3, CN 2-12 normal, no focal deficits noted. Sensations Intact. Muscle strength 5/5 proximally and distally in upper and lower extremities.    Psychiatric:  Speech and behavior appropriate           LABS AND IMAGING  Outside data reviewed and in HPI    Lab Results   Component Value Date    WBC 6.0 11/30/2020    RBC 4.34 11/30/2020    HGB 12.5 11/30/2020    HCT 38.3 11/30/2020     11/30/2020    MCV 88.3 11/30/2020    MCH 28.8 11/30/2020    MCHC 32.6 11/30/2020    RDW 14.7 11/30/2020    NRBC 1 05/26/2020    NRBC 1 05/26/2020    SEGSPCT 34 09/02/2017    LYMPHOPCT 48.2 11/30/2020    MONOPCT 5.0 11/30/2020    BASOPCT 0.5 11/30/2020    MONOSABS 0.3 11/30/2020    LYMPHSABS 2.9 11/30/2020    EOSABS 0.1 11/30/2020    BASOSABS 0.0 11/30/2020       Chemistry        Component Value Date/Time     03/04/2020 0501    K 3.5 03/04/2020 0501     03/04/2020 0501    CO2 26 03/04/2020 0501    BUN 16 03/04/2020 0501    CREATININE 0.6 11/30/2020 0928        Component Value Date/Time    CALCIUM 9.2 03/04/2020 0501    ALKPHOS 111 03/04/2020 0501    AST 19 11/30/2020 0928    ALT 20 11/30/2020 0928    BILITOT <0.2 03/04/2020 0501          Lab Results Component Value Date    SEDRATE 37 (H) 02/24/2020     Lab Results   Component Value Date    CRP 18.6 (H) 02/24/2020     No results found for: JADEN, SHANIQUE, SSA, SSB, C3, C4  Lab Results   Component Value Date    RF <10.0 03/16/2018    CCPABIGG 26 03/16/2018     No results found for: JADEN, ANATITER, ANAINT, PATH  No results found for: [facility], DSDNAIGGIFA  Lab Results   Component Value Date    SSALAAB 0 03/16/2018     No results found for: SMAB, RNPAB  No results found for: CENTABIGG  No results found for: C3, C4, ACE  No results found for: JO1, VITD25, AXC32NHNFY    ASSESSMENT AND PLAN      Assessment/Plan:      ASSESSMENT:    1. Rheumatoid arthritis of multiple sites with negative rheumatoid factor ([facility] [address]    2. Primary osteoarthritis involving multiple joints    3. Maintenance chemotherapy    4. Plantar fasciitis    5. UTI symptoms        PLAN:    Diagnosis Orders   1. Rheumatoid arthritis of multiple sites with negative rheumatoid factor (HCC)  predniSONE (DELTASONE) 5 MG tablet   2. Primary osteoarthritis involving multiple joints  VT ARTHROCENTESIS ASPIR&/INJ MAJOR JT/BURSA W/O US   3. Maintenance chemotherapy  CBC Auto Differential    Comprehensive Metabolic Panel   4. Plantar fasciitis     5. UTI symptoms  Urinalysis Reflex to Culture       1. Rheumatoid arthritis of multiple sites with negative rheumatoid factor (HCC)  Low titer positive CCP, elevated ESR and CRP and negative RF, hand x-rays with erosive osteoarthritis  -Mild active synovitis on joint exam, off of Enbrel for past 2 months due to UTI symptoms. Advised to hold Enbrel until evaluated by GYN and will obtain urine analysis  We will start prednisone taper in the meantime    Past medications:    -Discontinued leflunomide due to hair loss and sulfasalazine due to nausea and diarrhea  -No significant improvement with methotrexate        2. Primary osteoarthritis involving multiple joints  Symptomatic in bilateral knees.   Last corticosteroid injection was 3 months ago. Right knee corticosteroid injection lasted only for 1 month, MRI of the right knee with chondromalacia, moderate effusion, degenerative changes, scheduled to see orthopedic surgeon. Left knee corticosteroid injection lasted for 3 months  We will continue meloxicam 15 mg daily  We will do left knee corticosteroid injection  She saw orthopedic surgeon, viscosupplementation was approved she has not received it yet. She was advised to continue follow-up with orthopedic surgeon     3. Maintenance chemotherapy  Labs reviewed. Recommend flu, pneumonia, shingles vaccine    She is going to [facility] and will not be following with us. She is looking for a rheumatologist in [place]. 4.  Plantar fasciitis-we will provide with exercises. Continue meloxicam 15 mg daily    - predniSONE (DELTASONE) 5 MG tablet; Take 4 tabs daily for 4 days, 3 tabs daily for 4 days, 2 tabs daily for 4 days, 1 tab daily for 4 days and stop  Dispense: 40 tablet; Refill: 0  - CBC Auto Differential; Future  - Comprehensive Metabolic Panel; Future  - Urinalysis Reflex to Culture; Future      PROCEDURE NOTE    JOINT ASPIRATION/INJECTION  Left knee corticosteroid injection:  The patient was informed about the risk and benefits of the procedure, including the risk of infection, hemorrhage and skin hypopigmentation from the corticosteroids. After informed consent was obtained, using sterile technique, the anterior medial area was prepped and chlorhexidine was used as local anesthetic. The joint was entered and Kenalog 80 mg and 2 ml plain Lidocaine was then injected and the needle withdrawn. The procedure was well tolerated. No immediate complication noticed. The patient is asked to continue to rest the joint for a few more days before resuming regular activities. Advised patient to watch for fever, increased swelling or persistent pain in the joint.  Call or return to clinic prn if such symptoms occur or there is failure to improve as anticipated. The patient indicates understanding of these issues and agrees with the plan. Return in about 3 months (around 2/28/2021). 2. Rheumatoid arthritis of multiple sites with negative rheumatoid factor (HCC)  Stable. Continue Enbrel 50 mg once a week    3. Primary osteoarthritis involving multiple joints  Stable. Continue meloxicam 15 mg daily    4. Maintenance chemotherapy  Labs reviewed      The patient indicates understanding of these issues and agrees with the plan. Return if symptoms worsen or fail to improve. The risks and benefits of my recommendations, as well as other treatment options, benefits and side effects were discussed with the patient. All questions were answered. ######################################################################    I thank you for giving me the opportunity to participate in Laisha Carbajal [facility]. If you have any questions or concerns please feel free to contact me. I look forward to following  Calixto Joe along with you. Electronically signed by: Miles Jordan MD, 3/1/2021 10:25 AM    Documentation was done using voice recognition dragon software. Every effort was made to ensure accuracy; however, inadvertent unintentional computerized transcription errors may be present.

## 2021-03-01 NOTE — PATIENT INSTRUCTIONS
Patient Education      labs   Prednisone taper     Plantar Fasciitis: Exercises  Introduction  Here are some examples of exercises for you to try. The exercises may be suggested for a condition or for rehabilitation. Start each exercise slowly. Ease off the exercises if you start to have pain. You will be told when to start these exercises and which ones will work best for you. How to do the exercises  Towel stretch   1. Sit with your legs extended and knees straight. 2. Place a towel around your foot just under the toes. 3. Hold each end of the towel in each hand, with your hands above your knees. 4. Pull back with the towel so that your foot stretches toward you. 5. Hold the position for at least 15 to 30 seconds. 6. Repeat 2 to 4 times a session, up to 5 sessions a day. Calf stretch   This exercise stretches the muscles at the back of the lower leg (the calf) and the Achilles tendon. Do this exercise 3 or 4 times a day, 5 days a week. 1. Stand facing a wall with your hands on the wall at about eye level. Put the leg you want to stretch about a step behind your other leg. 2. Keeping your back heel on the floor, bend your front knee until you feel a stretch in the back leg. 3. Hold the stretch for 15 to 30 seconds. Repeat 2 to 4 times. Plantar fascia and calf stretch   Stretching the plantar fascia and calf muscles can increase flexibility and decrease heel pain. You can do this exercise several times each day and before and after activity. 1. Stand on a step as shown above. Be sure to hold on to the banister. 2. Slowly let your heels down over the edge of the step as you relax your calf muscles. You should feel a gentle stretch across the bottom of your foot and up the back of your leg to your knee. 3. Hold the stretch about 15 to 30 seconds, and then tighten your calf muscle a little to bring your heel back up to the level of the step. Repeat 2 to 4 times.     Towel curls Make this exercise more challenging by placing a weighted object, such as a soup can, on the other end of the towel. 1. While sitting, place your foot on a towel on the floor and scrunch the towel toward you with your toes. 2. Then, also using your toes, push the towel away from you. [place] pickups   1. Put marbles on the floor next to a cup.  2. Using your toes, try to lift the marbles up from the floor and put them in the cup. Follow-up care is a key part of your treatment and safety. Be sure to make and go to all appointments, and call your doctor if you are having problems. It's also a good idea to know your test results and keep a list of the medicines you take. Where can you learn more? Go to https://[email or web address] org and sign in to your [employer] account. Enter L563 in the [employer] box to learn more about \"Plantar Fasciitis: Exercises. \"     If you do not have an account, please click on the \"Sign Up Now\" link. Current as of: March 2, 2020               Content Version: 12.6  © 0976-1692 [employer]. Care instructions adapted under license by [facility] ([facility]). If you have questions about a medical condition or this instruction, always ask your healthcare professional. [address] any warranty or liability for your use of this information.   Urine analysis

## 2021-03-12 ENCOUNTER — OFFICE VISIT (OUTPATIENT)
Dept: ORTHOPEDIC SURGERY | Age: 56
End: 2021-03-12
Payer: COMMERCIAL

## 2021-03-12 VITALS — WEIGHT: 192 LBS | TEMPERATURE: 97 F | BODY MASS INDEX: 35.33 KG/M2 | HEIGHT: 62 IN

## 2021-03-12 DIAGNOSIS — M17.11 PRIMARY OSTEOARTHRITIS OF RIGHT KNEE: Primary | ICD-10-CM

## 2021-03-12 DIAGNOSIS — M05.79 RHEUMATOID ARTHRITIS INVOLVING MULTIPLE SITES WITH POSITIVE RHEUMATOID FACTOR (HCC): ICD-10-CM

## 2021-03-12 PROCEDURE — 99211 OFF/OP EST MAY X REQ PHY/QHP: CPT | Performed by: ORTHOPAEDIC SURGERY

## 2021-03-12 NOTE — PROGRESS NOTES
ORTHOPAEDIC CONSULTATION NOTE    Chief Complaint   Patient presents with    Follow-up     Right knee       HPI   3/12/2021  Here for visco injection  However, she just finished a prednisone taper, and has no knee pain today      12/2/2020  48 y.o. female seen in consultation at the request of Ashley Farooq MD for evaluation of right knee pain    Onset chronic > 6 months  Injury/trauma none  History of symptoms as above  Pain is located anteromedial right knee  Worse with activity, WB, getting up  Better with rest, tylenol, mobic    Previous right knee steroid injections ~6 to 7 she reports  Most recent was on 8/31/2020, and that one didn't help  Had left knee injection yesterday with Dr Jerad Mazariegos    Rheumatoid arthritis - on Enbrel      No Known Allergies     Current Outpatient Medications   Medication Sig Dispense Refill    meloxicam (MOBIC) 15 MG tablet TK 1 T PO QD 30 tablet 5    predniSONE (DELTASONE) 5 MG tablet Take 4 tabs daily for 4 days, 3 tabs daily for 4 days, 2 tabs daily for 4 days, 1 tab daily for 4 days and stop (Patient not taking: Reported on 3/12/2021) 40 tablet 0    Etanercept (ENBREL MINI) 50 MG/ML SOCT Inject 50 mg into the skin once a week (Patient not taking: Reported on 3/12/2021) 4 Cartridge 5     Current Facility-Administered Medications   Medication Dose Route Frequency Provider Last Rate Last Admin    lidocaine PF 1 % injection 2 mL  2 mL Intra-articular Once Ashley Farooq MD        triamcinolone acetonide (KENALOG-40) injection 80 mg  80 mg Intra-articular Once Ashley Farooq MD           Past Medical History:   Diagnosis Date    Anemia     Fibroids     Rheumatoid arthritis ([facility] [address]         Past Surgical History:   Procedure Laterality Date    HYSTERECTOMY  10/2013    [facility], lso    TOOTH EXTRACTION  04/2018       Family History   Problem Relation Age of Onset    Diabetes Mother     Hypertension Mother     Heart Failure Mother     Heart Attack Mother     Diabetes Father     Cancer Sister EHL/FHL/TA/GS intact  Right knee:   neutral alignment    Trace effusion noted   No obvious atrophy seen, but limited by body habitus    Range of Motion:    Extension:  3    Flexion:  ~110   Ligamentous testing:    Varus stress stable    Valgus stress stable        Imaging:  None today  Right knee 4 views performed previously   - Overall alignment is mild varus. The medial compartment is mildly to moderately narrowed with small osteophytes seen. The lateral compartment is mildly narrowed with small osteophytes seen. The anterior compartment is mildly to moderately narrowed with small osteophytes seen. The patella is well-centered within the trochlear groove. There are no loose bodies appreciated. Narrative    EXAMINATION:    MRI OF THE RIGHT KNEE WITHOUT CONTRAST, 11/19/2020 9:02 am         TECHNIQUE:    Multiplanar multisequence MRI of the right knee was performed without the    administration of intravenous contrast.         COMPARISON:    None.         HISTORY:    ORDERING SYSTEM PROVIDED HISTORY: Acute pain of right knee    TECHNOLOGIST PROVIDED HISTORY:    Reason for exam:->right knee pain    Is the patient pregnant?->No    Reason for Exam: PT STS NO KNOWN INJURY TO RIGHT KNEE    Acuity: Acute    Type of Exam: Initial    Additional signs and symptoms: PT STS NO KNOWN INJURY TO RIGHT KNEE    Relevant Medical/Surgical History: PT STS NO KNOWN INJURY TO RIGHT KNEE         FINDINGS:    MENISCI: Lateral meniscus is intact.  Blunting of the posterior horn medial    meniscus with subtle increased T2 signal noted throughout the posterior horn.     No fluid like signal.         CRUCIATE LIGAMENTS: ACL and PCL are intact.         EXTENSOR MECHANISM: Quadriceps and patellar tendons are intact.         LATERAL COLLATERAL LIGAMENT COMPLEX: The popliteus tendon, biceps femoris    tendon, fibular collateral ligament and iliotibial band are intact.         MEDIAL COLLATERAL LIGAMENT COMPLEX:         MCL is mildly thickened and increased in T2 signal with mild adjacent edema. No retracted tear.         KNEE JOINT: Small to moderate joint effusion.  Small tricompartment    osteophytes.  Grade 2-3 chondral fissuring along the patella.  Grade 3-4    chondral fissuring medial trochlea.  Grade 3-4 chondral fissuring    weight-bearing medial compartment with mild subchondral edema.  No    intra-articular body.         BONE MARROW: No acute fracture or suspicious marrow replacing lesion.         Small semimembranosus gastrocnemius bursal cyst.              Impression    Degenerative signal throughout the posterior horn of the medial meniscus with    blunting of the inner free edge.  No fluid like signal to suggest discrete    tear.         Low-grade MCL injury.         Small to moderate joint effusion.         Chondromalacia most prominent medial compartment.         Small semimembranosus gastrocnemius bursal cyst.             Full thickness cartilage loss of the medial femoral condyle      Assessment & Plan:  55 y.o. female who presents with    Diagnosis Orders   1. Primary osteoarthritis of right knee     2. Rheumatoid arthritis involving multiple sites with positive rheumatoid factor (HCC)         No orders of the defined types were placed in this encounter.     Patient without knee pain today    I have recommended against performing visco injection today since she is not having pain  There are risks to any intervention, including knee injections, and therefore I don't recommend it right now if she is not having symptoms    Her prior auth is good until 5/31/2021, therefore, she is advised to return if knee pain returns before then for injection as needed      Brennon Ramírez

## 2021-04-04 ENCOUNTER — PATIENT MESSAGE (OUTPATIENT)
Dept: RHEUMATOLOGY | Age: 56
End: 2021-04-04

## 2021-04-05 RX ORDER — MELOXICAM 15 MG/1
TABLET ORAL
Qty: 30 TABLET | Refills: 5 | Status: SHIPPED | OUTPATIENT
Start: 2021-04-05 | End: 2021-06-30 | Stop reason: SDUPTHER

## 2021-04-05 RX ORDER — MELOXICAM 15 MG/1
TABLET ORAL
Qty: 30 TABLET | Refills: 5 | Status: SHIPPED | OUTPATIENT
Start: 2021-04-05 | End: 2021-04-05 | Stop reason: SDUPTHER

## 2021-04-05 NOTE — TELEPHONE ENCOUNTER
Last visit 03/1/21  Lab 03/01/21  Next visit not scheduled     Pended to the [facility]

## 2021-04-13 DIAGNOSIS — M06.09 RHEUMATOID ARTHRITIS OF MULTIPLE SITES WITH NEGATIVE RHEUMATOID FACTOR (HCC): ICD-10-CM

## 2021-04-14 RX ORDER — ETANERCEPT 50 MG/ML
50 SOLUTION SUBCUTANEOUS WEEKLY
Qty: 4 CARTRIDGE | Refills: 2 | Status: SHIPPED | OUTPATIENT
Start: 2021-04-14 | End: 2021-04-28 | Stop reason: SDUPTHER

## 2021-04-27 ENCOUNTER — TELEPHONE (OUTPATIENT)
Dept: INTERNAL MEDICINE CLINIC | Age: 56
End: 2021-04-27

## 2021-04-27 NOTE — TELEPHONE ENCOUNTER
Jessica calling from [employer] on behalf of pt. Pts Etanercept (ENBREL MINI) 50 MG/ML SOCT prescription need PA. You can either call [phone] to start this process or go to [email or web address] and fill out the pdf form.

## 2021-04-28 ENCOUNTER — TELEPHONE (OUTPATIENT)
Dept: INTERNAL MEDICINE CLINIC | Age: 56
End: 2021-04-28

## 2021-04-28 DIAGNOSIS — M06.09 RHEUMATOID ARTHRITIS OF MULTIPLE SITES WITH NEGATIVE RHEUMATOID FACTOR (HCC): ICD-10-CM

## 2021-04-28 RX ORDER — ETANERCEPT 50 MG/ML
50 SOLUTION SUBCUTANEOUS WEEKLY
Qty: 4 CARTRIDGE | Refills: 2 | Status: SHIPPED | OUTPATIENT
Start: 2021-04-28 | End: 2021-06-30 | Stop reason: SDUPTHER

## 2021-06-04 ENCOUNTER — TELEPHONE (OUTPATIENT)
Dept: INTERNAL MEDICINE CLINIC | Age: 56
End: 2021-06-04

## 2021-06-04 DIAGNOSIS — M17.11 PRIMARY LOCALIZED OSTEOARTHRITIS OF RIGHT KNEE: Primary | ICD-10-CM

## 2021-06-04 DIAGNOSIS — M05.79 RHEUMATOID ARTHRITIS INVOLVING MULTIPLE SITES WITH POSITIVE RHEUMATOID FACTOR (HCC): ICD-10-CM

## 2021-06-04 NOTE — TELEPHONE ENCOUNTER
Patient states she moved to [facility]. She found a new rheumatologist and they advised her they needed a referral from Dr Jefe Wheatley in order for her to get appointment. She states it is [facility] #[phone].

## 2021-06-08 NOTE — TELEPHONE ENCOUNTER
Please call and let the patient know that she needs to call them to find the name of a doctor she needs referral for

## 2021-06-08 NOTE — TELEPHONE ENCOUNTER
Patient states that she was only given the name of the facility and fax number. She states they did not give her a specific doctors name.

## 2021-06-29 ENCOUNTER — OFFICE VISIT (OUTPATIENT)
Dept: ORTHOPEDIC SURGERY | Age: 56
End: 2021-06-29
Payer: COMMERCIAL

## 2021-06-29 ENCOUNTER — TELEPHONE (OUTPATIENT)
Dept: ORTHOPEDIC SURGERY | Age: 56
End: 2021-06-29

## 2021-06-29 VITALS — WEIGHT: 192 LBS | RESPIRATION RATE: 16 BRPM | HEIGHT: 62 IN | BODY MASS INDEX: 35.33 KG/M2

## 2021-06-29 DIAGNOSIS — M17.11 ARTHRITIS OF RIGHT KNEE: Primary | ICD-10-CM

## 2021-06-29 PROCEDURE — 20610 DRAIN/INJ JOINT/BURSA W/O US: CPT | Performed by: ORTHOPAEDIC SURGERY

## 2021-06-29 NOTE — PROGRESS NOTES
ORTHOPAEDIC CONSULTATION NOTE    Chief Complaint   Patient presents with    Knee Pain     knee        HPI   6/29/2021  Right knee is hurting her again  Pain is rated 8 out of 10  Would like to receive Visco injection today      3/12/2021  Here for visco injection  However, she just finished a prednisone taper, and has no knee pain today      12/2/2020  48 y.o. female seen in consultation at the request of Yong Monge MD for evaluation of right knee pain    Onset chronic > 6 months  Injury/trauma none  History of symptoms as above  Pain is located anteromedial right knee  Worse with activity, WB, getting up  Better with rest, tylenol, mobic    Previous right knee steroid injections ~6 to 7 she reports  Most recent was on 8/31/2020, and that one didn't help  Had left knee injection yesterday with Dr Trudy Argueta    Rheumatoid arthritis - on Enbrel      No Known Allergies     Current Outpatient Medications   Medication Sig Dispense Refill    predniSONE (DELTASONE) 5 MG tablet Take 4 tabs daily for 4 days, 3 tabs daily for 4 days, 2 tabs daily for 4 days, 1 tab daily for 4 days and stop 40 tablet 0    Etanercept (ENBREL MINI) 50 MG/ML SOCT Inject 50 mg into the skin once a week 4 Cartridge 2    meloxicam (MOBIC) 15 MG tablet TK 1 T PO QD 30 tablet 5     Current Facility-Administered Medications   Medication Dose Route Frequency Provider Last Rate Last Admin    Hylan injection 48 mg  48 mg Intra-articular Once Juliann Shine MD        lidocaine PF 1 % injection 2 mL  2 mL Intra-articular Once Yong Monge MD        triamcinolone acetonide (KENALOG-40) injection 80 mg  80 mg Intra-articular Once Yong Monge MD           Past Medical History:   Diagnosis Date    Anemia     Fibroids     Rheumatoid arthritis ([address]         Past Surgical History:   Procedure Laterality Date    HYSTERECTOMY  10/2013    [facility], lso    TOOTH EXTRACTION  04/2018       Family History   Problem Relation Age of Onset    Diabetes Mother    Juan Joshua Hypertension Mother     Heart Failure Mother     Heart Attack Mother     Diabetes Father     Cancer Sister         colon    Rheum Arthritis Neg Hx     Osteoarthritis Neg Hx     Asthma Neg Hx     Breast Cancer Neg Hx     High Cholesterol Neg Hx     Migraines Neg Hx     Ovarian Cancer Neg Hx     Rashes/Skin Problems Neg Hx     Seizures Neg Hx     Stroke Neg Hx     Thyroid Disease Neg Hx        Social History     Socioeconomic History    Marital status: Single     Spouse name: Not on file    Number of children: Not on file    Years of education: Not on file    Highest education level: Not on file   Occupational History    Not on file   Tobacco Use    Smoking status: Never Smoker    Smokeless tobacco: Never Used   Vaping Use    Vaping Use: Never used   Substance and Sexual Activity    Alcohol use: Yes     Comment: RARELY    Drug use: No    Sexual activity: Yes     Partners: Male   Other Topics Concern    Not on file   Social History Narrative    Not on file     Social Determinants of Health     Financial Resource Strain:     Difficulty of Paying Living Expenses:    Food Insecurity:     Worried About Running Out of Food in the Last Year:     Ran Out of Food in the Last Year:    Transportation Needs:     Lack of Transportation (Medical):      Lack of Transportation (Non-Medical):    Physical Activity:     Days of Exercise per Week:     Minutes of Exercise per Session:    Stress:     Feeling of Stress :    Social Connections:     Frequency of Communication with Friends and Family:     Frequency of Social Gatherings with Friends and Family:     Attends Yarsanism Services:     Active Member of Clubs or Organizations:     Attends Club or Organization Meetings:     Marital Status:    Intimate Partner Violence:     Fear of Current or Ex-Partner:     Emotionally Abused:     Physically Abused:     Sexually Abused:         Vitals:    06/29/21 1259   Resp: 16   Weight: 192 lb (87.1 kg) Height: 5' 2\" (1.575 m)       Physical Exam  Body mass index is 35.12 kg/m². Right knee Skin clear        Imaging:  None today  Right knee 4 views performed previously   - Overall alignment is mild varus. The medial compartment is mildly to moderately narrowed with small osteophytes seen. The lateral compartment is mildly narrowed with small osteophytes seen. The anterior compartment is mildly to moderately narrowed with small osteophytes seen. The patella is well-centered within the trochlear groove. There are no loose bodies appreciated. Narrative    EXAMINATION:    MRI OF THE RIGHT KNEE WITHOUT CONTRAST, 11/19/2020 9:02 am         TECHNIQUE:    Multiplanar multisequence MRI of the right knee was performed without the    administration of intravenous contrast.         COMPARISON:    None.         HISTORY:    ORDERING SYSTEM PROVIDED HISTORY: Acute pain of right knee    TECHNOLOGIST PROVIDED HISTORY:    Reason for exam:->right knee pain    Is the patient pregnant?->No    Reason for Exam: PT STS NO KNOWN INJURY TO RIGHT KNEE    Acuity: Acute    Type of Exam: Initial    Additional signs and symptoms: PT STS NO KNOWN INJURY TO RIGHT KNEE    Relevant Medical/Surgical History: PT STS NO KNOWN INJURY TO RIGHT KNEE         FINDINGS:    MENISCI: Lateral meniscus is intact.  Blunting of the posterior horn medial    meniscus with subtle increased T2 signal noted throughout the posterior horn. No fluid like signal.         CRUCIATE LIGAMENTS: ACL and PCL are intact.         EXTENSOR MECHANISM: Quadriceps and patellar tendons are intact.         LATERAL COLLATERAL LIGAMENT COMPLEX: The popliteus tendon, biceps femoris    tendon, fibular collateral ligament and iliotibial band are intact.         MEDIAL COLLATERAL LIGAMENT COMPLEX:         MCL is mildly thickened and increased in T2 signal with mild adjacent edema.     No retracted tear.         KNEE JOINT: Small to moderate joint effusion.  Small tricompartment    osteophytes.  Grade 2-3 chondral fissuring along the patella.  Grade 3-4    chondral fissuring medial trochlea.  Grade 3-4 chondral fissuring    weight-bearing medial compartment with mild subchondral edema.  No    intra-articular body.         BONE MARROW: No acute fracture or suspicious marrow replacing lesion.         Small semimembranosus gastrocnemius bursal cyst.              Impression    Degenerative signal throughout the posterior horn of the medial meniscus with    blunting of the inner free edge.  No fluid like signal to suggest discrete    tear.         Low-grade MCL injury.         Small to moderate joint effusion.         Chondromalacia most prominent medial compartment.         Small semimembranosus gastrocnemius bursal cyst.             Full thickness cartilage loss of the medial femoral condyle      Assessment & Plan:  55 y.o. female who presents with    Diagnosis Orders   1. Arthritis of right knee  NM ARTHROCENTESIS ASPIR&/INJ MAJOR JT/BURSA W/O US           Procedures    NM ARTHROCENTESIS ASPIR&/INJ MAJOR JT/BURSA W/O US       Risks and benefits of a viscosupplementation injection were discussed with the patient, including the possibility of adverse local site reactions such as infection or a pseudosepsis response. Although rare, an infection is possible and may necessitate surgical treatment if it occurs in the joint or develops into an abscess. The patient elected to proceed, and after verbal consent was obtained and drug allergies were reviewed, the injection site was prepped with alcohol and ChloraPrep. Synvisc-One was injected into the right knee. There were no immediate complications after the procedure. The patient was advised to ice the area intermittently over the next 24-48 hours until the injection becomes effective.       Nish Alexandra MD

## 2021-06-29 NOTE — TELEPHONE ENCOUNTER
06/29/2021  SYNVISC-ONE   RIGHT  KNEE. APPROVED #  [id number]. DATES:  06/29/2021 - 12/25/2021. GISEL ALLEN. PER FRANCOIS @ [facility] DEPT. FAXED APPROVAL LETTER COMING.   AP

## 2021-06-30 ENCOUNTER — OFFICE VISIT (OUTPATIENT)
Dept: RHEUMATOLOGY | Age: 56
End: 2021-06-30

## 2021-06-30 VITALS
HEART RATE: 80 BPM | WEIGHT: 198 LBS | BODY MASS INDEX: 36.21 KG/M2 | DIASTOLIC BLOOD PRESSURE: 68 MMHG | SYSTOLIC BLOOD PRESSURE: 110 MMHG

## 2021-06-30 DIAGNOSIS — M06.09 RHEUMATOID ARTHRITIS OF MULTIPLE SITES WITH NEGATIVE RHEUMATOID FACTOR (HCC): Primary | ICD-10-CM

## 2021-06-30 DIAGNOSIS — M15.9 PRIMARY OSTEOARTHRITIS INVOLVING MULTIPLE JOINTS: ICD-10-CM

## 2021-06-30 DIAGNOSIS — Z51.11 MAINTENANCE CHEMOTHERAPY: ICD-10-CM

## 2021-06-30 PROCEDURE — 99214 OFFICE O/P EST MOD 30 MIN: CPT | Performed by: INTERNAL MEDICINE

## 2021-06-30 PROCEDURE — 20610 DRAIN/INJ JOINT/BURSA W/O US: CPT | Performed by: INTERNAL MEDICINE

## 2021-06-30 RX ORDER — MELOXICAM 15 MG/1
TABLET ORAL
Qty: 30 TABLET | Refills: 5 | Status: SHIPPED | OUTPATIENT
Start: 2021-06-30 | End: 2022-08-29 | Stop reason: SDUPTHER

## 2021-06-30 RX ORDER — TRIAMCINOLONE ACETONIDE 40 MG/ML
80 INJECTION, SUSPENSION INTRA-ARTICULAR; INTRAMUSCULAR ONCE
Status: COMPLETED | OUTPATIENT
Start: 2021-06-30 | End: 2021-06-30

## 2021-06-30 RX ORDER — LIDOCAINE HYDROCHLORIDE 20 MG/ML
2 INJECTION, SOLUTION INFILTRATION; PERINEURAL ONCE
Status: COMPLETED | OUTPATIENT
Start: 2021-06-30 | End: 2021-06-30

## 2021-06-30 RX ORDER — ETANERCEPT 50 MG/ML
50 SOLUTION SUBCUTANEOUS WEEKLY
Qty: 12 CARTRIDGE | Refills: 2 | Status: SHIPPED | OUTPATIENT
Start: 2021-06-30 | End: 2021-07-25 | Stop reason: SDUPTHER

## 2021-06-30 RX ADMIN — TRIAMCINOLONE ACETONIDE 80 MG: 40 INJECTION, SUSPENSION INTRA-ARTICULAR; INTRAMUSCULAR at 13:12

## 2021-06-30 RX ADMIN — LIDOCAINE HYDROCHLORIDE 2 ML: 20 INJECTION, SOLUTION INFILTRATION; PERINEURAL at 13:11

## 2021-06-30 NOTE — PROGRESS NOTES
2021  Patient Name: Adrian Rizzo  : 1965  Medical Record: [id number]    MEDICATIONS  Current Outpatient Medications   Medication Sig Dispense Refill    Etanercept (ENBREL MINI) 50 MG/ML SOCT Inject 50 mg into the skin once a week 12 Cartridge 2    meloxicam (MOBIC) 15 MG tablet TK 1 T PO QD 30 tablet 5     Current Facility-Administered Medications   Medication Dose Route Frequency Provider Last Rate Last Admin    lidocaine PF 1 % injection 2 mL  2 mL Intra-articular Once Mariano Moffett MD        triamcinolone acetonide (KENALOG-40) injection 80 mg  80 mg Intra-articular Once Mariano Moffett MD           ALLERGIES  No Known Allergies      Comments  No specialty comments available. Background history:  Adrian Rizzo is a 55 y.o. female who is being seen for follow up evaluation of Joint pain. She is complaining of pain in hands, left hip and left knee. Pain in the hands started 2 years ago and is progressively getting worse. She describes her pain as constant, aching, 5-6 out of 10 without any significant relieving or aggravating factors. Pain is located in PIP, MCP, DIP joints. Pain in the left hip is located on the lateral side and gets worse on laying on the left side. Knees crack and pop. Knee pain gets worse going up and down the steps. She has swelling in the knuckles, stiffness throughout the day. She also has difficulty opening doorknobs and jar cans. She is on meloxicam 7.5 mg daily without improvement. She denies any history of psoriasis, inflammatory bowel disease, inflammatory back pain, uveitis, enthesitis, tenosynovitis or dactylitis. Interim history: She presents for follow-up of Osteoarthritis and rheumatoid arthritis. She is on Enbrel 50 mg once a week and meloxicam 50 mg daily. She has pain, swelling and stiffness in the left knee. She has bilateral knee osteoarthritis. She received Synvisc injection in the right knee.   She is scheduled to get left knee corticosteroid injection today. She denies any other joint pain or swelling or stiffness. She denies any side effects with Enbrel on meloxicam.  She moved to [facility] but is not able to find a rheumatologist who can see her sooner. She is off of methotrexate. She did not tolerate sulfasalazine [developed nausea, abdominal pain and diarrhea]. She did not tolerate Arava either [hair loss]. X-rays of the hands consistent with erosive osteoarthritis. Knee x-rays mild degenerative changes. Blood work shows low titer positive CCP, elevated ESR and CRP. Knee Pain       ROS    REVIEW OF SYSTEMS:   Constitutional: No unanticipated weight loss or fevers. No fatigue and malaise. Integumentary: No photosensitivity, malar rash, livedo reticularis, alopecia and Raynaud's symptoms, sclerodactyly, skin tightening  Eyes: negative for dry eyes, visual disturbance and persistent redness, discharge from eyes   ENT: - No tinnitus, loss of hearing, vertigo, or recurrent ear infections.  - No history of nasal/oral ulcers. - No history of sicca symptoms. Cardiovascular: No history of pericarditis, chest pain or murmur or palpitations  Respiratory: No shortness of breath, cough or history of interstitial lung disease. No history of pleurisy. No history of tuberculosis or atypical infections. Gastrointestinal: No history of dysphagia or esophageal dysmotility. No change in bowel habits or any inflammatory bowel disease. Genitourinary: No history renal disease, miscarriages. Hematologic/Lymphatic: No  bleeding, blood clots or swollen lymph nodes. Neurological: No history seizure or focal weakness.  No history of neuropathies, paresthesias or hyperesthesias, facial droop, diplopia  Psychiatric: No history of bipolar disease, anxiety, depression  Endocrine: Denies any thyroid / parathyroid disease and osteoporosis  Allergic/Immunologic: No nasal congestion         I have reviewed patients Past medical History, Social Diabetes Father     Cancer Sister         colon    Rheum Arthritis Neg Hx     Osteoarthritis Neg Hx     Asthma Neg Hx     Breast Cancer Neg Hx     High Cholesterol Neg Hx     Migraines Neg Hx     Ovarian Cancer Neg Hx     Rashes/Skin Problems Neg Hx     Seizures Neg Hx     Stroke Neg Hx     Thyroid Disease Neg Hx          PHYSICAL EXAM   Vitals:    06/30/21 1258   BP: 110/68   Pulse: 80   Weight: 198 lb (89.8 kg)     Physical Exam  Constitutional:  Well developed, well nourished, no acute distress, non-toxic appearance   Musculoskeletal:    RIGHT  Swell  Tender  ROM  LEFT  Swell  Tender  ROM    DIP2  0  0  Heberden   0  0  Heberden    DIP3  0  0  Heberden   0  0  Heberden    DIP4  0  0  Heberden   0  0  Heberden    DIP5  0  0  Heberden   0  0  Heberden    PIP1  + 0 Bony change   + 0 Bony change    PIP2  + 0 Bony change   + 0 Bony change    PIP3  + 0 Bony change   + 0 Bony change    PIP4  + 0 Bony change   ++ + Bony change    PIP5  + 0 Bony change   0 0 Bony change    MCP1  0  0  FULL   0  0  FULL    MCP2  0  0  FULL   0  0  FULL    MCP3  0  0  FULL   0  0  FULL    MCP4  0  0  FULL   0  0  FULL    MCP5  0  0  FULL   0  0  FULL    Wrist  0 0 FULL   0  0  FULL    Elbow  0  0  FULL   0  0  FULL    Shouldr  0  0  FULL   0  0  FULL    Hip  0  0  FULL   0  + FULL    Knee  0 0 Crepitus   0 + Crepitus    Ankle  0  0  FULL   0  0  FULL    MTP1  0  0  FULL   0  0  FULL    MTP2  0  0  FULL   0  0  FULL    MTP3  0  0  FULL   0  0  FULL    MTP4  0  0  FULL   0  0  FULL    MTP5  0  0  FULL   0  0  FULL    IP1  0  0  FULL   0  0  FULL    IP2  0  0  FULL   0  0  FULL    IP3  0  0  FULL   0  0  FULL    IP4  0  0  FULL   0  0  FULL    IP5  0  0  FULL   0 0 FULL     Ambulates without assistance, normal gait  Neck: Full ROM, no tenderness, supple   Back- no tenderness.   Eyes:  PERRL, extra ocular movements intact, conjunctiva normal   HEENT:  Atraumatic, normocephalic, external ears normal, oropharynx moist, no pharyngeal exudates. Respiratory:  No respiratory distress  GI:  Soft, nondistended, normal bowel sounds, nontender, no organomegaly, no mass, no rebound, no guarding   :  No costovertebral angle tenderness   Integument:  Well hydrated, no telangiectasias  Lymphatic:  No lymphadenopathy noted   Neurologic:   Alert & oriented x 3, CN 2-12 normal, no focal deficits noted. Sensations Intact. Muscle strength 5/5 proximally and distally in upper and lower extremities.    Psychiatric:  Speech and behavior appropriate           LABS AND IMAGING  Outside data reviewed and in HPI    Lab Results   Component Value Date    WBC 5.6 03/01/2021    RBC 4.18 03/01/2021    HGB 12.0 03/01/2021    HCT 36.3 03/01/2021     03/01/2021    MCV 86.9 03/01/2021    MCH 28.7 03/01/2021    MCHC 33.0 03/01/2021    RDW 15.1 03/01/2021    NRBC 1 05/26/2020    NRBC 1 05/26/2020    SEGSPCT 34 09/02/2017    LYMPHOPCT 58.5 03/01/2021    MONOPCT 5.7 03/01/2021    BASOPCT 0.5 03/01/2021    MONOSABS 0.3 03/01/2021    LYMPHSABS 3.3 03/01/2021    EOSABS 0.1 03/01/2021    BASOSABS 0.0 03/01/2021       Chemistry        Component Value Date/Time     03/01/2021 1042    K 4.1 03/01/2021 1042    K 3.5 03/04/2020 0501     03/01/2021 1042    CO2 29 03/01/2021 1042    BUN 11 03/01/2021 1042    CREATININE 0.5 (L) 03/01/2021 1042        Component Value Date/Time    CALCIUM 10.0 03/01/2021 1042    ALKPHOS 118 03/01/2021 1042    AST 19 03/01/2021 1042    ALT 18 03/01/2021 1042    BILITOT <0.2 03/01/2021 1042          Lab Results   Component Value Date    SEDRATE 43 (H) 02/24/2020     Lab Results   Component Value Date    CRP 18.6 (H) 02/24/2020     No results found for: JADEN, SHANIQUE, SSA, SSB, C3, C4  Lab Results   Component Value Date    RF <10.0 03/16/2018    CCPABIGG 26 03/16/2018     No results found for: JADEN, ANATITER, ANAINT, PATH  No results found for: [facility], DSDNAIGGIFA  Lab Results   Component Value Date    SSALAAB 0 03/16/2018     No results found for: SMAB, RNPAB  No results found for: CENTABIGG  No results found for: C3, C4, ACE  No results found for: JO1, VITD25, XVR62PKWVM    ASSESSMENT AND PLAN      Assessment/Plan:      ASSESSMENT:    1. Rheumatoid arthritis of multiple sites with negative rheumatoid factor ([facility] [address]    2. Maintenance chemotherapy    3. Primary osteoarthritis involving multiple joints        PLAN:    Diagnosis Orders   1. Rheumatoid arthritis of multiple sites with negative rheumatoid factor (HCC)  Etanercept (ENBREL MINI) 50 MG/ML SOCT   2. Maintenance chemotherapy  CBC Auto Differential    Comprehensive Metabolic Panel   3. Primary osteoarthritis involving multiple joints  UT ARTHROCENTESIS ASPIR&/INJ MAJOR JT/BURSA W/O US       1. Rheumatoid arthritis of multiple sites with negative rheumatoid factor (HCC)  Low titer positive CCP, elevated ESR and CRP and negative RF, hand x-rays with erosive osteoarthritis  -Stable  Continue Enbrel 50 mg once a week  Past medications:    -Discontinued leflunomide due to hair loss and sulfasalazine due to nausea and diarrhea  -No significant improvement with methotrexate        2. Primary osteoarthritis involving multiple joints  Symptomatic in bilateral knees. Status post right knee gel injection  We will do left knee corticosteroid injection  Continue meloxicam 15 mg daily    3. Maintenance chemotherapy  Labs reviewed. Recommend flu, pneumonia, shingles vaccine      PROCEDURE NOTE    JOINT ASPIRATION/INJECTION  Left knee corticosteroid injection:  The patient was informed about the risk and benefits of the procedure, including the risk of infection, hemorrhage and skin hypopigmentation from the corticosteroids. After informed consent was obtained, using sterile technique, the anterior medial area was prepped and chlorhexidine was used as local anesthetic. The joint was entered and Kenalog 80 mg and 2 ml plain Lidocaine was then injected and the needle withdrawn. The procedure was well tolerated.  No

## 2021-06-30 NOTE — PROGRESS NOTES
2021  Patient Name: Kelly Naqvi  : 1965  Medical Record: [id number]    MEDICATIONS  Current Outpatient Medications   Medication Sig Dispense Refill    predniSONE (DELTASONE) 5 MG tablet Take 4 tabs daily for 4 days, 3 tabs daily for 4 days, 2 tabs daily for 4 days, 1 tab daily for 4 days and stop 40 tablet 0    Etanercept (ENBREL MINI) 50 MG/ML SOCT Inject 50 mg into the skin once a week 4 Cartridge 2    meloxicam (MOBIC) 15 MG tablet TK 1 T PO QD 30 tablet 5     Current Facility-Administered Medications   Medication Dose Route Frequency Provider Last Rate Last Admin    lidocaine PF 1 % injection 2 mL  2 mL Intra-articular Once Raheem Baker MD        triamcinolone acetonide (KENALOG-40) injection 80 mg  80 mg Intra-articular Once Raheem Baker MD           ALLERGIES  No Known Allergies      Comments  No specialty comments available. Background history:  Kelly Naqvi is a 55 y.o. female who is being seen for follow up evaluation of Joint pain. She is complaining of pain in hands, left hip and left knee. Pain in the hands started 2 years ago and is progressively getting worse. She describes her pain as constant, aching, 5-6 out of 10 without any significant relieving or aggravating factors. Pain is located in PIP, MCP, DIP joints. Pain in the left hip is located on the lateral side and gets worse on laying on the left side. Knees crack and pop. Knee pain gets worse going up and down the steps. She has swelling in the knuckles, stiffness throughout the day. She also has difficulty opening doorknobs and jar cans. She is on meloxicam 7.5 mg daily without improvement. She denies any history of psoriasis, inflammatory bowel disease, inflammatory back pain, uveitis, enthesitis, tenosynovitis or dactylitis. Interim history: She presents for follow-up of Osteoarthritis and rheumatoid arthritis. She is off of Enbrel for past 2 months due to urinary tract symptoms.   She is scheduled to see GYN. She also has vaginal discharge. She denies any fever. She has burning urine. She denies any frequency or urgency or suprapubic pain. Joint symptoms have worsened off of Enbrel especially in the right foot in 2 3 and 4 MTP joint. She also has pain and stiffness and swelling in the left for PIP joint. She has pain in the bottom of the left foot which gets worse in the morning after taking a full step. She was saw orthopedic surgeon for bilateral knee osteoarthritis. She has moderate to severe osteoarthritis in the right knee. She had steroid injection in the right knee that lasted for only 1 month. She was advised to get viscosupplementation which was approved but she has not gotten it yet. She is requesting a corticosteroid injection in the left knee which helped in the past.       She denies any other joint pain or swelling or stiffness. She denies any morning stiffness. He denies any side effects with Enbrel. She is off of methotrexate. She did not tolerate sulfasalazine [developed nausea, abdominal pain and diarrhea]. She did not tolerate Arava either [hair loss]. X-rays of the hands consistent with erosive osteoarthritis. Knee x-rays mild degenerative changes. Blood work shows low titer positive CCP, elevated ESR and CRP. Knee Pain       ROS    REVIEW OF SYSTEMS:   Constitutional: No unanticipated weight loss or fevers. No fatigue and malaise. Integumentary: No photosensitivity, malar rash, livedo reticularis, alopecia and Raynaud's symptoms, sclerodactyly, skin tightening  Eyes: negative for dry eyes, visual disturbance and persistent redness, discharge from eyes   ENT: - No tinnitus, loss of hearing, vertigo, or recurrent ear infections.  - No history of nasal/oral ulcers. - No history of sicca symptoms.   Cardiovascular: No history of pericarditis, chest pain or murmur or palpitations  Respiratory: No shortness of breath, cough or history of interstitial lung (Non-Medical):    Physical Activity:     Days of Exercise per Week:     Minutes of Exercise per Session:    Stress:     Feeling of Stress :    Social Connections:     Frequency of Communication with Friends and Family:     Frequency of Social Gatherings with Friends and Family:     Attends Yazdanism Services:     Active Member of Clubs or Organizations:     Attends Club or Organization Meetings:     Marital Status:    Intimate Partner Violence:     Fear of Current or Ex-Partner:     Emotionally Abused:     Physically Abused:     Sexually Abused:      Family History   Problem Relation Age of Onset    Diabetes Mother     Hypertension Mother     Heart Failure Mother     Heart Attack Mother     Diabetes Father     Cancer Sister         colon    Rheum Arthritis Neg Hx     Osteoarthritis Neg Hx     Asthma Neg Hx     Breast Cancer Neg Hx     High Cholesterol Neg Hx     Migraines Neg Hx     Ovarian Cancer Neg Hx     Rashes/Skin Problems Neg Hx     Seizures Neg Hx     Stroke Neg Hx     Thyroid Disease Neg Hx          PHYSICAL EXAM   Vitals:    06/30/21 1258   BP: 110/68   Pulse: 80   Weight: 198 lb (89.8 kg)     Physical Exam  Constitutional:  Well developed, well nourished, no acute distress, non-toxic appearance   Musculoskeletal:    RIGHT  Swell  Tender  ROM  LEFT  Swell  Tender  ROM    DIP2  0  0  Heberden   0  0  Heberden    DIP3  0  0  Heberden   0  0  Heberden    DIP4  0  0  Heberden   0  0  Heberden    DIP5  0  0  Heberden   0  0  Heberden    PIP1  + 0 Bony change   + 0 Bony change    PIP2  + 0 Bony change   + 0 Bony change    PIP3  + 0 Bony change   + 0 Bony change    PIP4  + 0 Bony change   ++ + Bony change    PIP5  + 0 Bony change   0 0 Bony change    MCP1  0  0  FULL   0  0  FULL    MCP2  0  0  FULL   0  0  FULL    MCP3  0  0  FULL   0  0  FULL    MCP4  0  0  FULL   0  0  FULL    MCP5  0  0  FULL   0  0  FULL    Wrist  0 0 FULL   0  0  FULL    Elbow  0  0  FULL   0  0  FULL Shouldr  0  0  FULL   0  0  FULL    Hip  0  0  FULL   0  + FULL    Knee  + + Crepitus   0 + Crepitus    Ankle  0  0  FULL   0  0  FULL    MTP1  0  0  FULL   0  0  FULL    MTP2  0  0  FULL   0  0  FULL    MTP3  0  0  FULL   0  0  FULL    MTP4  0  0  FULL   0  0  FULL    MTP5  0  0  FULL   0  0  FULL    IP1  0  0  FULL   0  0  FULL    IP2  0  0  FULL   0  0  FULL    IP3  0  0  FULL   0  0  FULL    IP4  0  0  FULL   0  0  FULL    IP5  0  0  FULL   0 0 FULL     Ambulates without assistance, normal gait  Neck: Full ROM, no tenderness, supple   Back- no tenderness. Eyes:  PERRL, extra ocular movements intact, conjunctiva normal   HEENT:  Atraumatic, normocephalic, external ears normal, oropharynx moist, no pharyngeal exudates. Respiratory:  No respiratory distress  GI:  Soft, nondistended, normal bowel sounds, nontender, no organomegaly, no mass, no rebound, no guarding   :  No costovertebral angle tenderness   Integument:  Well hydrated, no telangiectasias  Lymphatic:  No lymphadenopathy noted   Neurologic:   Alert & oriented x 3, CN 2-12 normal, no focal deficits noted. Sensations Intact. Muscle strength 5/5 proximally and distally in upper and lower extremities.    Psychiatric:  Speech and behavior appropriate           LABS AND IMAGING  Outside data reviewed and in HPI    Lab Results   Component Value Date    WBC 5.6 03/01/2021    RBC 4.18 03/01/2021    HGB 12.0 03/01/2021    HCT 36.3 03/01/2021     03/01/2021    MCV 86.9 03/01/2021    MCH 28.7 03/01/2021    MCHC 33.0 03/01/2021    RDW 15.1 03/01/2021    NRBC 1 05/26/2020    NRBC 1 05/26/2020    SEGSPCT 34 09/02/2017    LYMPHOPCT 58.5 03/01/2021    MONOPCT 5.7 03/01/2021    BASOPCT 0.5 03/01/2021    MONOSABS 0.3 03/01/2021    LYMPHSABS 3.3 03/01/2021    EOSABS 0.1 03/01/2021    BASOSABS 0.0 03/01/2021       Chemistry        Component Value Date/Time     03/01/2021 1042    K 4.1 03/01/2021 1042    K 3.5 03/04/2020 0501     03/01/2021 1042 with us. She is looking for a rheumatologist in [place]. 4.  Plantar fasciitis-we will provide with exercises. Continue meloxicam 15 mg daily    - predniSONE (DELTASONE) 5 MG tablet; Take 4 tabs daily for 4 days, 3 tabs daily for 4 days, 2 tabs daily for 4 days, 1 tab daily for 4 days and stop  Dispense: 40 tablet; Refill: 0  - CBC Auto Differential; Future  - Comprehensive Metabolic Panel; Future  - Urinalysis Reflex to Culture; Future      PROCEDURE NOTE    JOINT ASPIRATION/INJECTION  Left knee corticosteroid injection:  The patient was informed about the risk and benefits of the procedure, including the risk of infection, hemorrhage and skin hypopigmentation from the corticosteroids. After informed consent was obtained, using sterile technique, the anterior medial area was prepped and chlorhexidine was used as local anesthetic. The joint was entered and Kenalog 80 mg and 2 ml plain Lidocaine was then injected and the needle withdrawn. The procedure was well tolerated. No immediate complication noticed. The patient is asked to continue to rest the joint for a few more days before resuming regular activities. Advised patient to watch for fever, increased swelling or persistent pain in the joint. Call or return to clinic prn if such symptoms occur or there is failure to improve as anticipated. The patient indicates understanding of these issues and agrees with the plan. Return in about 3 months (around 2/28/2021). 2. Rheumatoid arthritis of multiple sites with negative rheumatoid factor (HCC)  Stable. Continue Enbrel 50 mg once a week    3. Primary osteoarthritis involving multiple joints  Stable. Continue meloxicam 15 mg daily    4. Maintenance chemotherapy  Labs reviewed      The patient indicates understanding of these issues and agrees with the plan. No follow-ups on file.       The risks and benefits of my recommendations, as well as other treatment options, benefits and side effects were discussed with the patient. All questions were answered. ######################################################################    I thank you for giving me the opportunity to participate in [facility]. If you have any questions or concerns please feel free to contact me. I look forward to following  Soraya Jacobson along with you. Electronically signed by: Suad Roy MD, MD, 6/30/2021 1:05 PM    Documentation was done using voice recognition dragon software. Every effort was made to ensure accuracy; however, inadvertent unintentional computerized transcription errors may be present.

## 2021-07-01 ENCOUNTER — TELEPHONE (OUTPATIENT)
Dept: PRIMARY CARE CLINIC | Age: 56
End: 2021-07-01

## 2021-07-02 ENCOUNTER — TELEPHONE (OUTPATIENT)
Dept: FAMILY MEDICINE CLINIC | Age: 56
End: 2021-07-02

## 2021-07-25 DIAGNOSIS — M06.09 RHEUMATOID ARTHRITIS OF MULTIPLE SITES WITH NEGATIVE RHEUMATOID FACTOR (HCC): ICD-10-CM

## 2021-07-26 ENCOUNTER — TELEPHONE (OUTPATIENT)
Dept: FAMILY MEDICINE CLINIC | Age: 56
End: 2021-07-26

## 2021-07-26 ENCOUNTER — NURSE TRIAGE (OUTPATIENT)
Dept: OTHER | Facility: CLINIC | Age: 56
End: 2021-07-26

## 2021-07-26 ENCOUNTER — OFFICE VISIT (OUTPATIENT)
Dept: FAMILY MEDICINE CLINIC | Age: 56
End: 2021-07-26
Payer: COMMERCIAL

## 2021-07-26 VITALS — HEART RATE: 85 BPM | OXYGEN SATURATION: 99 % | TEMPERATURE: 97.7 F

## 2021-07-26 DIAGNOSIS — K52.9 ACUTE GASTROENTERITIS: ICD-10-CM

## 2021-07-26 DIAGNOSIS — M54.50 ACUTE BILATERAL LOW BACK PAIN WITHOUT SCIATICA: Primary | ICD-10-CM

## 2021-07-26 LAB
BACTERIA URINE, POC: 0
BILIRUBIN URINE: 0 MG/DL
BLOOD, URINE: POSITIVE
CASTS URINE, POC: 0
CLARITY: CLEAR
COLOR: NORMAL
CRYSTALS URINE, POC: 0
EPI CELLS URINE, POC: NORMAL
GLUCOSE URINE: NORMAL
KETONES, URINE: NEGATIVE
LEUKOCYTE EST, POC: NORMAL
NITRITE, URINE: NEGATIVE
PH UA: 5.5 (ref 4.5–8)
PROTEIN UA: NEGATIVE
RBC URINE, POC: 2
SPECIFIC GRAVITY UA: 1.03 (ref 1–1.03)
UROBILINOGEN, URINE: NORMAL
WBC URINE, POC: 0
YEAST URINE, POC: 0

## 2021-07-26 PROCEDURE — 99213 OFFICE O/P EST LOW 20 MIN: CPT | Performed by: FAMILY MEDICINE

## 2021-07-26 PROCEDURE — 81000 URINALYSIS NONAUTO W/SCOPE: CPT | Performed by: FAMILY MEDICINE

## 2021-07-26 RX ORDER — ETANERCEPT 50 MG/ML
50 SOLUTION SUBCUTANEOUS WEEKLY
Qty: 12 CARTRIDGE | Refills: 2 | Status: SHIPPED | OUTPATIENT
Start: 2021-07-26 | End: 2021-09-29 | Stop reason: SDUPTHER

## 2021-07-26 RX ORDER — CIPROFLOXACIN 500 MG/1
500 TABLET, FILM COATED ORAL 2 TIMES DAILY
Qty: 10 TABLET | Refills: 0 | Status: SHIPPED | OUTPATIENT
Start: 2021-07-26 | End: 2021-07-31

## 2021-07-26 ASSESSMENT — PATIENT HEALTH QUESTIONNAIRE - PHQ9
SUM OF ALL RESPONSES TO PHQ QUESTIONS 1-9: 0
2. FEELING DOWN, DEPRESSED OR HOPELESS: 0
SUM OF ALL RESPONSES TO PHQ QUESTIONS 1-9: 0
1. LITTLE INTEREST OR PLEASURE IN DOING THINGS: 0
SUM OF ALL RESPONSES TO PHQ QUESTIONS 1-9: 0
SUM OF ALL RESPONSES TO PHQ9 QUESTIONS 1 & 2: 0

## 2021-07-26 NOTE — TELEPHONE ENCOUNTER
----- Message from Ihsanchamp Aleman sent at 7/26/2021  9:37 AM EDT -----  Subject: Appointment Request    Reason for Call: Immediate Return from RN Triage    QUESTIONS  Type of Appointment? Established Patient  Reason for appointment request? No appointments available during search  Additional Information for Provider? Patient is having urinary symptoms   and loose stool. Patient was triaged to be seen today, no appointments   were available. Called the office, there was no answers. Advised the   Patient to go to Urgent Care.   ---------------------------------------------------------------------------  --------------  CALL BACK INFO  What is the best way for the office to contact you? OK to leave message on   Sendiail, OK to respond with electronic message via [employer] portal (only   for patients who have registered [employer] account)  Preferred Call Back Phone Number? [phone]  ---------------------------------------------------------------------------  --------------  SCRIPT ANSWERS  Patient needs to be seen today? Yes  Nurse Name? Yoli  Have you been diagnosed with, awaiting test results for, or told that you   are suspected of having COVID-19 (Coronavirus)? (If patient has tested   negative or was tested as a requirement for work, school, or travel and   not based on symptoms, answer no)? No  Do you currently have flu-like symptoms including fever or chills, cough,   shortness of breath, difficulty breathing, or new loss of taste or smell? No  Have you had close contact with someone with COVID-19 in the last 14 days? No  (Service Expert  click yes below to proceed with [employer] As Usual   Scheduling)?  Yes

## 2021-07-26 NOTE — PROGRESS NOTES
Subjective:      Patient ID: Coco Joyce is a 55 y.o. female. HPI patient resents stating that 7 days ago she had diarrhea that was rather severe and now she does having some small frequent loose bowel movements. She is more concerned that she develop bilateral flank pain and some darkness of the urine. She denies any fevers or chills. She has been drinking plenty of fluids and has been eating light diet. She never had issues like this in the past.    Review of Systems  No Known Allergies  Vitals:    07/26/21 1435   Pulse: 85   Temp: 97.7 °F (36.5 °C)   SpO2: 99%       Objective:   Physical Exam  Constitutional:       General: She is not in acute distress. Appearance: Normal appearance. She is well-developed. Abdominal:      General: Bowel sounds are normal. There is no distension. Palpations: Abdomen is soft. Abdomen is not rigid. There is no hepatomegaly, splenomegaly or mass. Tenderness: There is no abdominal tenderness. There is no right CVA tenderness, left CVA tenderness, guarding or rebound. Hernia: No hernia is present. Musculoskeletal:      Lumbar back: No deformity, spasms or tenderness. Normal range of motion. Skin:     General: Skin is warm. Findings: No rash. Neurological:      Mental Status: She is alert. Mental status is at baseline. Psychiatric:         Behavior: Behavior is cooperative. Assessment:      Omar Campbell was seen today for diarrhea and back pain. Diagnoses and all orders for this visit:    Acute bilateral low back pain without sciatica  -     POC URINE with Microscopic    Acute gastroenteritis    Other orders  -     ciprofloxacin (CIPRO) 500 MG tablet; Take 1 tablet by mouth 2 times daily for 5 days            Plan:      Acute gastroenteritis symptoms and for the most part resolved and advised her just to avoid dairy products for next 4 to 5 days in her diet.   Encourage patient to drink 6 a glass of fluid a day for rehydration

## 2021-07-26 NOTE — TELEPHONE ENCOUNTER
Received call from Dyana Wren at [facility] with [employer]. Brief description of triage: see below    Triage indicates for patient to be seen in office today. Recommended pt go to [facility] if unable to obtain an appt for today. Pt agreeable to POC. Care advice provided, patient verbalizes understanding; denies any other questions or concerns; instructed to call back for any new or worsening symptoms. Writer provided warm transfer to [place] [id number] at [facility] for appointment scheduling. Attention Provider: Thank you for allowing me to participate in the care of your patient. The patient was connected to triage in response to information provided to the [facility]. Please do not respond through this encounter as the response is not directed to a shared pool. Reason for Disposition   Side (flank) or lower back pain present    Answer Assessment - Initial Assessment Questions  1. DIARRHEA SEVERITY: \"How bad is the diarrhea? \" \"How many extra stools have you had in the past 24 hours than normal?\"     - NO DIARRHEA (SCALE 0)    - MILD (SCALE 1-3): Few loose or mushy BMs; increase of 1-3 stools over normal daily number of stools; mild increase in ostomy output. -  MODERATE (SCALE 4-7): Increase of 4-6 stools daily over normal; moderate increase in ostomy output. * SEVERE (SCALE 8-10; OR 'WORST POSSIBLE'): Increase of 7 or more stools daily over normal; moderate increase in ostomy output; incontinence. Pt states all day last Sunday with cramping during diarrhea; more than one a day of loose stool since    2. ONSET: \"When did the diarrhea begin? \"       Last Sunday (a week ago from yesterday)     3. BM CONSISTENCY: \"How loose or watery is the diarrhea? \"       Pt states loose    4. VOMITING: \"Are you also vomiting? \" If so, ask: \"How many times in the past 24 hours? \"       Pt denies    5. ABDOMINAL PAIN:  [place] you having any abdominal pain? \" If yes: \"What does it feel like? \" (e.g., crampy, dull, intermittent, constant)       Pt states cramping just during BMs    6. ABDOMINAL PAIN SEVERITY: If present, ask: \"How bad is the pain? \"  (e.g., Scale 1-10; mild, moderate, or severe)    - MILD (1-3): doesn't interfere with normal activities, abdomen soft and not tender to touch     - MODERATE (4-7): interferes with normal activities or awakens from sleep, tender to touch     - SEVERE (8-10): excruciating pain, doubled over, unable to do any normal activities        Pt states not too bad today - none at all today    7. ORAL INTAKE: If vomiting, \"Have you been able to drink liquids? \" \"How much fluids have you had in the past 24 hours? \"      N/A - pt denies vomiting    8. HYDRATION: \"Any signs of dehydration? \" (e.g., dry mouth [not just dry lips], too weak to stand, dizziness, new weight loss) \"When did you last urinate? \"      Pt states one day last week she felt like she was going to fall down last week, but better today    9. EXPOSURE: \"Have you traveled to a foreign country recently? \" \"Have you been exposed to anyone with diarrhea? \" \"Could you have eaten any food that was spoiled? \"      Pt denies    10. ANTIBIOTIC USE: \"Are you taking antibiotics now or have you taken antibiotics in the past 2 months? \"        Pt denies    11. OTHER SYMPTOMS: \"Do you have any other symptoms? \" (e.g., fever, blood in stool)        Pt states urinating more than usual; states pain mostly on the left upper side of back    12. PREGNANCY: \"Is there any chance you are pregnant? \" \"When was your last menstrual period? \"        N/A    Answer Assessment - Initial Assessment Questions  1. SYMPTOM: \"What's the main symptom you're concerned about? \" (e.g., frequency, incontinence)      Back pain and urinary frequency    2. ONSET: \"When did the  Pain and frequency  start? \"      Pt states Saturday    3. PAIN: \"Is there any pain? \" If so, ask: \"How bad is it? \" (Scale: 1-10; mild, moderate, severe)      Pt states back pain is 2/10 right now    4.  CAUSE: \"What do you think is causing the symptoms? \"      Pt unsure    5. OTHER SYMPTOMS: \"Do you have any other symptoms? \" (e.g., fever, flank pain, blood in urine, pain with urination)      Pt states foul odor to urine and also darker urine; denies fever; states burning on urination on Sunday as well    6. PREGNANCY: \"Is there any chance you are pregnant? \" \"When was your last menstrual period? \"      N/A    Protocols used: URINARY SYMPTOMS-ADULT-[place], DIARRHEA-ADULT-[place]

## 2021-08-25 ENCOUNTER — TELEPHONE (OUTPATIENT)
Dept: INTERNAL MEDICINE CLINIC | Age: 56
End: 2021-08-25

## 2021-08-25 NOTE — TELEPHONE ENCOUNTER
[id number] called re:enbrel auto touch device. They need a verbal order for new script replacement.   # [phone]

## 2021-09-28 ENCOUNTER — OFFICE VISIT (OUTPATIENT)
Dept: FAMILY MEDICINE CLINIC | Age: 56
End: 2021-09-28
Payer: COMMERCIAL

## 2021-09-28 VITALS
DIASTOLIC BLOOD PRESSURE: 80 MMHG | HEIGHT: 63 IN | OXYGEN SATURATION: 100 % | BODY MASS INDEX: 35.86 KG/M2 | SYSTOLIC BLOOD PRESSURE: 160 MMHG | TEMPERATURE: 97.3 F | HEART RATE: 82 BPM | WEIGHT: 202.4 LBS

## 2021-09-28 DIAGNOSIS — Z00.00 PREVENTATIVE HEALTH CARE: Primary | ICD-10-CM

## 2021-09-28 DIAGNOSIS — M06.9 RHEUMATOID ARTHRITIS, INVOLVING UNSPECIFIED SITE, UNSPECIFIED WHETHER RHEUMATOID FACTOR PRESENT (HCC): ICD-10-CM

## 2021-09-28 DIAGNOSIS — Z12.39 ENCOUNTER FOR SCREENING FOR MALIGNANT NEOPLASM OF BREAST, UNSPECIFIED SCREENING MODALITY: ICD-10-CM

## 2021-09-28 DIAGNOSIS — I10 ESSENTIAL HYPERTENSION: ICD-10-CM

## 2021-09-28 DIAGNOSIS — D64.9 ANEMIA, UNSPECIFIED TYPE: ICD-10-CM

## 2021-09-28 DIAGNOSIS — Z13.1 SCREENING FOR DIABETES MELLITUS: ICD-10-CM

## 2021-09-28 DIAGNOSIS — Z13.220 SCREENING, LIPID: ICD-10-CM

## 2021-09-28 PROCEDURE — 99396 PREV VISIT EST AGE 40-64: CPT | Performed by: PHYSICIAN ASSISTANT

## 2021-09-28 RX ORDER — AMLODIPINE BESYLATE 5 MG/1
5 TABLET ORAL DAILY
Qty: 30 TABLET | Refills: 5 | Status: SHIPPED | OUTPATIENT
Start: 2021-09-28 | End: 2022-06-28 | Stop reason: SDUPTHER

## 2021-09-28 ASSESSMENT — ENCOUNTER SYMPTOMS
EYE PAIN: 0
ABDOMINAL PAIN: 0
TROUBLE SWALLOWING: 0
DIARRHEA: 0
CONSTIPATION: 0
VOICE CHANGE: 0
SORE THROAT: 0
CHEST TIGHTNESS: 0
BACK PAIN: 0
COUGH: 0
SHORTNESS OF BREATH: 0

## 2021-09-28 NOTE — PROGRESS NOTES
Subjective:      Patient ID: Sobia Ness is a 55 y.o. female. HPI Patient is here today for her annual exam.     She moved to [facility] and comes up here every 3 months to see her rheumatologist and has not been here in 4 years. She has RA and gets treated by them. She had a dog bite this summer, had a Tdap in June. She has not had Covid vaccines. Her BP is a little elevated here today, but it was higher in ER Friday when she went for her knee. It seems to go up and down. She is going to Ortho next week for her knee pain. She sleeps well. She has a good appetite. She admits to probably too much salt in her diet but generally thinks her diet is \"ok\". She is not exercising currently due to her knee pain. She is due for mammogram, eye exam and dental exam. She had last pap smear 3 years ago. She has never had a colonoscopy. Review of Systems   Constitutional: Negative for appetite change, fatigue and unexpected weight change. HENT: Negative for congestion, dental problem, ear pain, hearing loss, sore throat, trouble swallowing and voice change. Eyes: Negative for pain and visual disturbance. Respiratory: Negative for cough, chest tightness and shortness of breath. Cardiovascular: Negative for chest pain and palpitations. Gastrointestinal: Negative for abdominal pain, constipation and diarrhea. Genitourinary: Negative for difficulty urinating. Musculoskeletal: Positive for arthralgias (right knee). Negative for back pain, myalgias and neck pain. Skin: Negative for rash. Neurological: Negative for dizziness, speech difficulty, weakness, numbness and headaches. Hematological: Negative for adenopathy. Psychiatric/Behavioral: Negative for confusion and sleep disturbance. The patient is not nervous/anxious. Objective:   Physical Exam  Vitals reviewed. Constitutional:       Appearance: Normal appearance. She is well-developed and well-groomed.    HENT:      Head: Normocephalic. Right Ear: Tympanic membrane and ear canal normal.      Left Ear: Tympanic membrane and ear canal normal.      Nose: Nose normal.      Mouth/Throat:      Pharynx: Oropharynx is clear. Uvula midline. Eyes:      Conjunctiva/sclera: Conjunctivae normal.      Pupils: Pupils are equal, round, and reactive to light. Neck:      Thyroid: No thyroid mass or thyromegaly. Trachea: Trachea normal.   Cardiovascular:      Rate and Rhythm: Normal rate and regular rhythm. Chest Wall: PMI is not displaced. Pulses: Normal pulses. Heart sounds: Normal heart sounds, S1 normal and S2 normal.   Pulmonary:      Effort: Pulmonary effort is normal.      Breath sounds: Normal breath sounds. Abdominal:      General: Abdomen is flat. Bowel sounds are normal.      Palpations: Abdomen is soft. Tenderness: There is no abdominal tenderness. There is no guarding or rebound. Hernia: No hernia is present. Musculoskeletal:      Cervical back: Normal range of motion and neck supple. Thoracic back: Normal.      Lumbar back: Normal.      Right hip: Normal.      Left hip: Normal.      Comments: Gait normal, did not assess her right knee, full ROM and strength of torso and other extremities   Lymphadenopathy:      Cervical: No cervical adenopathy. Skin:     General: Skin is warm and dry. Findings: No rash. Neurological:      Mental Status: She is alert and oriented to person, place, and time. Cranial Nerves: No cranial nerve deficit. Sensory: No sensory deficit. Gait: Gait normal.   Psychiatric:         Attention and Perception: Attention normal.         Mood and Affect: Mood normal.         Speech: Speech normal.         Behavior: Behavior normal. Behavior is cooperative.          Assessment:      Ming Nassar was seen today for annual exam.    Diagnoses and all orders for this visit:    Preventative health care    Rheumatoid arthritis, involving unspecified site, unspecified whether rheumatoid factor present (HCC)    Screening, lipid  -     LIPID PANEL; Future    Screening for diabetes mellitus  -     Comprehensive Metabolic Panel    Anemia, unspecified type  -     CBC Auto Differential  -     IRON AND TIBC; Future  -     FERRITIN; Future  -     VITAMIN B12 & FOLATE; Future    Encounter for screening for malignant neoplasm of breast, unspecified screening modality  -     [facility] DIGITAL SCREEN W OR WO CAD BILATERAL; Future    Essential hypertension  -     amLODIPine (NORVASC) 5 MG tablet; Take 1 tablet by mouth daily             Plan:      Get routine labs, mammogram, start norvasc, let me know BP readings in 2 weeks while you are trying to get into a new PCP in [facility].          Chadwick Strickland

## 2021-09-28 NOTE — PATIENT INSTRUCTIONS
Ming Nasasr was seen today for annual exam.    Diagnoses and all orders for this visit:    Preventative health care    Rheumatoid arthritis, involving unspecified site, unspecified whether rheumatoid factor present ([facility] [address]    Screening, lipid  -     LIPID PANEL; Future    Screening for diabetes mellitus  -     Comprehensive Metabolic Panel    Anemia, unspecified type  -     CBC Auto Differential  -     IRON AND TIBC; Future  -     FERRITIN; Future  -     VITAMIN B12 & FOLATE; Future    Encounter for screening for malignant neoplasm of breast, unspecified screening modality  -     [facility] DIGITAL SCREEN W OR WO CAD BILATERAL; Future    Essential hypertension  -     amLODIPine (NORVASC) 5 MG tablet;  Take 1 tablet by mouth daily       Get routine labs tomorrow, start Norvasc daily, get mammogram, get colonoscopy down in [facility],

## 2021-09-28 NOTE — LETTER
[address] [place] [address]  Phone: [phone]  Fax: [address] [place] [address]        September 28, 2021     Patient: Randal Perry   YOB: 1965   Date of Visit: 9/28/2021       To Whom It May Concern:    Please excuse patient from work 9/28/21-10/1/21, she was in [facility] for her doctor's appointments. If you have any questions or concerns, please don't hesitate to call.     Sincerely,        VIBHA Anthony

## 2021-09-29 ENCOUNTER — OFFICE VISIT (OUTPATIENT)
Dept: RHEUMATOLOGY | Age: 56
End: 2021-09-29
Payer: COMMERCIAL

## 2021-09-29 VITALS
WEIGHT: 202.4 LBS | DIASTOLIC BLOOD PRESSURE: 80 MMHG | SYSTOLIC BLOOD PRESSURE: 132 MMHG | HEART RATE: 64 BPM | BODY MASS INDEX: 35.85 KG/M2

## 2021-09-29 DIAGNOSIS — M06.09 RHEUMATOID ARTHRITIS OF MULTIPLE SITES WITH NEGATIVE RHEUMATOID FACTOR (HCC): Primary | ICD-10-CM

## 2021-09-29 DIAGNOSIS — M15.9 PRIMARY OSTEOARTHRITIS INVOLVING MULTIPLE JOINTS: ICD-10-CM

## 2021-09-29 DIAGNOSIS — Z51.11 MAINTENANCE CHEMOTHERAPY: ICD-10-CM

## 2021-09-29 PROCEDURE — 20610 DRAIN/INJ JOINT/BURSA W/O US: CPT | Performed by: INTERNAL MEDICINE

## 2021-09-29 PROCEDURE — 99214 OFFICE O/P EST MOD 30 MIN: CPT | Performed by: INTERNAL MEDICINE

## 2021-09-29 RX ORDER — LIDOCAINE HYDROCHLORIDE 20 MG/ML
2 INJECTION, SOLUTION INFILTRATION; PERINEURAL ONCE
Status: SHIPPED | OUTPATIENT
Start: 2021-09-29

## 2021-09-29 RX ORDER — TRIAMCINOLONE ACETONIDE 40 MG/ML
80 INJECTION, SUSPENSION INTRA-ARTICULAR; INTRAMUSCULAR ONCE
Status: SHIPPED | OUTPATIENT
Start: 2021-09-29

## 2021-09-29 RX ORDER — ETANERCEPT 50 MG/ML
50 SOLUTION SUBCUTANEOUS WEEKLY
Qty: 4 EACH | Refills: 5 | Status: SHIPPED | OUTPATIENT
Start: 2021-09-29 | End: 2022-05-09 | Stop reason: SDUPTHER

## 2021-09-29 NOTE — LETTER
[facility]  [address] [address]  Phone: [phone]  Fax: [phone]    Bharat Alan MD        September 29, 2021     Patient: Barrett Najera   YOB: 1965   Date of Visit: 9/29/2021       To Whom It May Concern:    Sd Red was seen in office today at [address]. It is my medical opinion that Sd Red may return to work however must be seated during work activities due to medical restrictions. If you have any questions or concerns, please don't hesitate to call.     Sincerely,        Bharat Alan MD

## 2021-09-29 NOTE — PROGRESS NOTES
2021  Patient Name: Arlene Hale  : 1965  Medical Record: [id number]    MEDICATIONS  Current Outpatient Medications   Medication Sig Dispense Refill    Etanercept (ENBREL MINI) 50 MG/ML SOCT Inject 50 mg into the skin once a week 4 each 5    amLODIPine (NORVASC) 5 MG tablet Take 1 tablet by mouth daily 30 tablet 5    meloxicam (MOBIC) 15 MG tablet TK 1 T PO QD 30 tablet 5     Current Facility-Administered Medications   Medication Dose Route Frequency Provider Last Rate Last Admin    lidocaine PF 1 % injection 2 mL  2 mL Intra-articular Once Darleen Oliver MD        triamcinolone acetonide (KENALOG-40) injection 80 mg  80 mg Intra-articular Once Darleen Oliver MD           ALLERGIES  No Known Allergies      Comments  No specialty comments available. Background history:  Arlene Hale is a 55 y.o. female who is being seen for follow up evaluation of Joint pain. She is complaining of pain in hands, left hip and left knee. Pain in the hands started 2 years ago and is progressively getting worse. She describes her pain as constant, aching, 5-6 out of 10 without any significant relieving or aggravating factors. Pain is located in PIP, MCP, DIP joints. Pain in the left hip is located on the lateral side and gets worse on laying on the left side. Knees crack and pop. Knee pain gets worse going up and down the steps. She has swelling in the knuckles, stiffness throughout the day. She also has difficulty opening doorknobs and jar cans. She is on meloxicam 7.5 mg daily without improvement. She denies any history of psoriasis, inflammatory bowel disease, inflammatory back pain, uveitis, enthesitis, tenosynovitis or dactylitis. Interim history: She presents for follow-up of Osteoarthritis and rheumatoid arthritis. She is on Enbrel 50 mg once a week and meloxicam 15 mg daily. She has pain, swelling and stiffness in the right knee and left knee. Symptoms are worse in the right knee.   She received corticosteroid injection in the left knee 3 months ago which lasted for at least 2 months. She received gel injection in the right knee 3 months ago without any benefit. She has bilateral knee osteoarthritis. She denies any other joint pain or swelling or stiffness. She denies any side effects with Enbrel or meloxicam.  She moved to [facility] but is not able to find a rheumatologist who can see her sooner. She is off of methotrexate. She did not tolerate sulfasalazine [developed nausea, abdominal pain and diarrhea]. She did not tolerate Arava either [hair loss]. X-rays of the hands consistent with erosive osteoarthritis. Knee x-rays mild degenerative changes. Blood work shows low titer positive CCP, elevated ESR and CRP. Knee Pain       ROS    REVIEW OF SYSTEMS:   Constitutional: No unanticipated weight loss or fevers. No fatigue and malaise. Integumentary: No photosensitivity, malar rash, livedo reticularis, alopecia and Raynaud's symptoms, sclerodactyly, skin tightening  Eyes: negative for dry eyes, visual disturbance and persistent redness, discharge from eyes   ENT: - No tinnitus, loss of hearing, vertigo, or recurrent ear infections.  - No history of nasal/oral ulcers. - No history of sicca symptoms. Cardiovascular: No history of pericarditis, chest pain or murmur or palpitations  Respiratory: No shortness of breath, cough or history of interstitial lung disease. No history of pleurisy. No history of tuberculosis or atypical infections. Gastrointestinal: No history of dysphagia or esophageal dysmotility. No change in bowel habits or any inflammatory bowel disease. Genitourinary: No history renal disease, miscarriages. Hematologic/Lymphatic: No  bleeding, blood clots or swollen lymph nodes. Neurological: No history seizure or focal weakness.  No history of neuropathies, paresthesias or hyperesthesias, facial droop, diplopia  Psychiatric: No history of bipolar disease, anxiety, depression  Endocrine: Denies any thyroid / parathyroid disease and osteoporosis  Allergic/Immunologic: No nasal congestion         I have reviewed patients Past medical History, Social History and Family History as mentioned in her chart and this remains unchanged from previous. Past Medical History:   Diagnosis Date    Anemia     Fibroids     Rheumatoid arthritis ([facility] [address]      Past Surgical History:   Procedure Laterality Date    HYSTERECTOMY  10/2013    TLH, lso    TOOTH EXTRACTION  04/2018     Social History     Socioeconomic History    Marital status: Single     Spouse name: Not on file    Number of children: Not on file    Years of education: Not on file    Highest education level: Not on file   Occupational History    Not on file   Tobacco Use    Smoking status: Never Smoker    Smokeless tobacco: Never Used   Vaping Use    Vaping Use: Never used   Substance and Sexual Activity    Alcohol use: Yes     Comment: RARELY    Drug use: No    Sexual activity: Yes     Partners: Male   Other Topics Concern    Not on file   Social History Narrative    Not on file     Social Determinants of Health     Financial Resource Strain:     Difficulty of Paying Living Expenses:    Food Insecurity:     Worried About Running Out of Food in the Last Year:     Ran Out of Food in the Last Year:    Transportation Needs:     Lack of Transportation (Medical):      Lack of Transportation (Non-Medical):    Physical Activity:     Days of Exercise per Week:     Minutes of Exercise per Session:    Stress:     Feeling of Stress :    Social Connections:     Frequency of Communication with Friends and Family:     Frequency of Social Gatherings with Friends and Family:     Attends Bahai Services:     Active Member of Clubs or Organizations:     Attends Club or Organization Meetings:     Marital Status:    Intimate Partner Violence:     Fear of Current or Ex-Partner:     Emotionally Abused:     Physically Abused:     Sexually Abused:      Family History   Problem Relation Age of Onset    Diabetes Mother     Hypertension Mother     Heart Failure Mother     Heart Attack Mother     Diabetes Father     Cancer Sister         colon    Rheum Arthritis Neg Hx     Osteoarthritis Neg Hx     Asthma Neg Hx     Breast Cancer Neg Hx     High Cholesterol Neg Hx     Migraines Neg Hx     Ovarian Cancer Neg Hx     Rashes/Skin Problems Neg Hx     Seizures Neg Hx     Stroke Neg Hx     Thyroid Disease Neg Hx          PHYSICAL EXAM   Vitals:    09/29/21 0840   BP: 132/80   Pulse: 64   Weight: 202 lb 6.4 oz (91.8 kg)     Physical Exam  Constitutional:  Well developed, well nourished, no acute distress, non-toxic appearance   Musculoskeletal:    RIGHT  Swell  Tender  ROM  LEFT  Swell  Tender  ROM    DIP2  0  0  Heberden   0  0  Heberden    DIP3  0  0  Heberden   0  0  Heberden    DIP4  0  0  Heberden   0  0  Heberden    DIP5  0  0  Heberden   0  0  Heberden    PIP1  + 0 Bony change   + 0 Bony change    PIP2  + 0 Bony change   + 0 Bony change    PIP3  + 0 Bony change   + 0 Bony change    PIP4  + 0 Bony change   ++ + Bony change    PIP5  + 0 Bony change   0 0 Bony change    MCP1  0  0  FULL   0  0  FULL    MCP2  0  0  FULL   0  0  FULL    MCP3  0  0  FULL   0  0  FULL    MCP4  0  0  FULL   0  0  FULL    MCP5  0  0  FULL   0  0  FULL    Wrist  0 0 FULL   0  0  FULL    Elbow  0  0  FULL   0  0  FULL    Shouldr  0  0  FULL   0  0  FULL    Hip  0  0  FULL   0  + FULL    Knee  0 0 Crepitus   0 + Crepitus    Ankle  0  0  FULL   0  0  FULL    MTP1  0  0  FULL   0  0  FULL    MTP2  0  0  FULL   0  0  FULL    MTP3  0  0  FULL   0  0  FULL    MTP4  0  0  FULL   0  0  FULL    MTP5  0  0  FULL   0  0  FULL    IP1  0  0  FULL   0  0  FULL    IP2  0  0  FULL   0  0  FULL    IP3  0  0  FULL   0  0  FULL    IP4  0  0  FULL   0  0  FULL    IP5  0  0  FULL   0 0 FULL     Ambulates without assistance, normal gait  Neck: Full ROM, no tenderness, supple   Back- no tenderness. Eyes:  PERRL, extra ocular movements intact, conjunctiva normal   HEENT:  Atraumatic, normocephalic, external ears normal, oropharynx moist, no pharyngeal exudates. Respiratory:  No respiratory distress  GI:  Soft, nondistended, normal bowel sounds, nontender, no organomegaly, no mass, no rebound, no guarding   :  No costovertebral angle tenderness   Integument:  Well hydrated, no telangiectasias  Lymphatic:  No lymphadenopathy noted   Neurologic:   Alert & oriented x 3, CN 2-12 normal, no focal deficits noted. Sensations Intact. Muscle strength 5/5 proximally and distally in upper and lower extremities.    Psychiatric:  Speech and behavior appropriate           LABS AND IMAGING  Outside data reviewed and in HPI    Lab Results   Component Value Date    WBC 5.2 06/30/2021    RBC 3.83 06/30/2021    HGB 11.1 06/30/2021    HCT 33.5 06/30/2021     06/30/2021    MCV 87.5 06/30/2021    MCH 28.9 06/30/2021    MCHC 33.0 06/30/2021    RDW 14.7 06/30/2021    NRBC 1 05/26/2020    NRBC 1 05/26/2020    SEGSPCT 34 09/02/2017    LYMPHOPCT 51.6 06/30/2021    MONOPCT 5.8 06/30/2021    BASOPCT 0.3 06/30/2021    MONOSABS 0.3 06/30/2021    LYMPHSABS 2.7 06/30/2021    EOSABS 0.1 06/30/2021    BASOSABS 0.0 06/30/2021       Chemistry        Component Value Date/Time     06/30/2021 1338    K 3.7 06/30/2021 1338    K 3.5 03/04/2020 0501     06/30/2021 1338    CO2 26 06/30/2021 1338    BUN 11 06/30/2021 1338    CREATININE 0.6 06/30/2021 1338        Component Value Date/Time    CALCIUM 9.6 06/30/2021 1338    ALKPHOS 133 (H) 06/30/2021 1338    AST 14 (L) 06/30/2021 1338    ALT 13 06/30/2021 1338    BILITOT <0.2 06/30/2021 1338          Lab Results   Component Value Date    SEDRATE 43 (H) 02/24/2020     Lab Results   Component Value Date    CRP 18.6 (H) 02/24/2020     No results found for: JADEN, SHANIQUE, SSA, SSB, C3, C4  Lab Results   Component Value Date    RF <10.0 03/16/2018 CCPABIGG 26 03/16/2018     No results found for: JADEN, ANATITER, ANAINT, PATH  No results found for: [facility], DSDNAIGGIFA  Lab Results   Component Value Date    SSALAAB 0 03/16/2018     No results found for: SMAB, RNPAB  No results found for: CENTABIGG  No results found for: C3, C4, ACE  No results found for: JO1, VITD25, IAK26GQVZS    Degenerative signal throughout the posterior horn of the medial meniscus with   blunting of the inner free edge.  No fluid like signal to suggest discrete   tear.       Low-grade MCL injury.       Small to moderate joint effusion.       Chondromalacia most prominent medial compartment.       Small semimembranosus gastrocnemius bursal cyst.         ASSESSMENT AND PLAN      Assessment/Plan:      ASSESSMENT:    1. Rheumatoid arthritis of multiple sites with negative rheumatoid factor ([facility] [address]    2. Maintenance chemotherapy    3. Primary osteoarthritis involving multiple joints        PLAN:    Diagnosis Orders   1. Rheumatoid arthritis of multiple sites with negative rheumatoid factor (HCC)  Etanercept (ENBREL MINI) 50 MG/ML SOCT   2. Maintenance chemotherapy  ALT    AST    CBC Auto Differential    Creatinine, Serum   3. Primary osteoarthritis involving multiple joints  VT ARTHROCENTESIS ASPIR&/INJ MAJOR JT/BURSA W/O US       1. Rheumatoid arthritis of multiple sites with negative rheumatoid factor (HCC)  Low titer positive CCP, elevated ESR and CRP and negative RF, hand x-rays with erosive osteoarthritis  -Stable  Continue Enbrel 50 mg once a week  Past medications:    -Discontinued leflunomide due to hair loss and sulfasalazine due to nausea and diarrhea  -No significant improvement with methotrexate        2. Primary osteoarthritis involving multiple joints  Symptomatic in bilateral knees. Status post right knee gel injection without improvement  We will do b/l knee corticosteroid injection  Continue meloxicam 15 mg daily    3. Maintenance chemotherapy  Labs reviewed.   Recommend flu, pneumonia, shingles vaccine      PROCEDURE NOTE    JOINT ASPIRATION/INJECTION    The patient was informed about the risk and benefits of the procedure, including the risk of infection, hemorrhage and skin hypopigmentation from the corticosteroids. After informed consent was obtained, using sterile technique, the anterior medial area was prepped and chlorhexidine was used as local anesthetic. The joint was entered and Kenalog 80 mg and 2 ml plain Lidocaine was then injected and the needle withdrawn. The procedure was well tolerated. No immediate complication noticed. The patient is asked to continue to rest the joint for a few more days before resuming regular activities. Advised patient to watch for fever, increased swelling or persistent pain in the joint. Call or return to clinic prn if such symptoms occur or there is failure to improve as anticipated. Return in about 6 months (around 3/29/2022). The patient indicates understanding of these issues and agrees with the plan. Return in about 6 months (around 3/29/2022). The risks and benefits of my recommendations, as well as other treatment options, benefits and side effects were discussed with the patient. All questions were answered. ######################################################################    I thank you for giving me the opportunity to participate in [facility]. If you have any questions or concerns please feel free to contact me. I look forward to following  Crissie Backers along with you. Electronically signed by: Bharat Alan MD, MD, 9/29/2021 9:10 AM    Documentation was done using voice recognition dragon software. Every effort was made to ensure accuracy; however, inadvertent unintentional computerized transcription errors may be present.

## 2021-09-29 NOTE — PROGRESS NOTES
Kenalog:  TracyAlice Valladareswilmanrashel 47 [id number]  Lot # [id number]   Exp 06/2022    Lidocaine:  Batool [address] 47 [id number]  Lot # [id number]  Exp 06/2023

## 2021-10-01 DIAGNOSIS — R73.9 HYPERGLYCEMIA: Primary | ICD-10-CM

## 2021-10-15 ENCOUNTER — PATIENT MESSAGE (OUTPATIENT)
Dept: RHEUMATOLOGY | Age: 56
End: 2021-10-15

## 2021-10-15 DIAGNOSIS — M15.9 PRIMARY OSTEOARTHRITIS INVOLVING MULTIPLE JOINTS: Primary | ICD-10-CM

## 2021-10-18 DIAGNOSIS — M15.9 PRIMARY OSTEOARTHRITIS INVOLVING MULTIPLE JOINTS: ICD-10-CM

## 2021-10-18 RX ORDER — PREDNISONE 1 MG/1
TABLET ORAL
Qty: 40 TABLET | Refills: 0 | Status: SHIPPED | OUTPATIENT
Start: 2021-10-18 | End: 2021-10-18 | Stop reason: SDUPTHER

## 2021-10-18 RX ORDER — PREDNISONE 1 MG/1
TABLET ORAL
Qty: 40 TABLET | Refills: 0 | Status: SHIPPED | OUTPATIENT
Start: 2021-10-18 | End: 2021-11-11 | Stop reason: SDUPTHER

## 2021-10-18 NOTE — TELEPHONE ENCOUNTER
Please call the patient and let her know that I will send in a prescription for prednisone taper to help with the knee symptoms but she has to find a orthopedic specialist.  She is not responding to the steroid injections either.

## 2021-11-11 DIAGNOSIS — M15.9 PRIMARY OSTEOARTHRITIS INVOLVING MULTIPLE JOINTS: ICD-10-CM

## 2021-11-12 RX ORDER — PREDNISONE 1 MG/1
TABLET ORAL
Qty: 40 TABLET | Refills: 0 | Status: SHIPPED | OUTPATIENT
Start: 2021-11-12 | End: 2022-03-07 | Stop reason: ALTCHOICE

## 2021-12-11 ENCOUNTER — PATIENT MESSAGE (OUTPATIENT)
Dept: RHEUMATOLOGY | Age: 56
End: 2021-12-11

## 2021-12-11 DIAGNOSIS — M06.09 RHEUMATOID ARTHRITIS OF MULTIPLE SITES WITH NEGATIVE RHEUMATOID FACTOR (HCC): Primary | ICD-10-CM

## 2021-12-13 DIAGNOSIS — M15.9 PRIMARY OSTEOARTHRITIS INVOLVING MULTIPLE JOINTS: ICD-10-CM

## 2021-12-13 RX ORDER — PREDNISONE 1 MG/1
TABLET ORAL
Qty: 40 TABLET | Refills: 0 | OUTPATIENT
Start: 2021-12-13

## 2021-12-14 RX ORDER — PREDNISONE 1 MG/1
TABLET ORAL
Qty: 40 TABLET | Refills: 0 | Status: SHIPPED | OUTPATIENT
Start: 2021-12-14 | End: 2022-03-07 | Stop reason: ALTCHOICE

## 2021-12-14 NOTE — TELEPHONE ENCOUNTER
From: Blanca Christina  To: Dr. Javier Bustos: 12/11/2021 4:02 PM EST  Subject: Left knee     It going on 3 month since I had a steriod so my left knee is starting to both me you scheduled me a 6 months appointment I usually get on every 3 months need something for pain don't have a doctor here about to see if the Orthopedic here can do it

## 2021-12-14 NOTE — TELEPHONE ENCOUNTER
I will send in a prescription for prednisone. I do not have much to offer for her knees. She has to follow-up with an orthopedic specialist. She is not due for another steroid injection yet.

## 2022-02-23 ENCOUNTER — TELEPHONE (OUTPATIENT)
Dept: FAMILY MEDICINE CLINIC | Age: 57
End: 2022-02-23

## 2022-02-23 DIAGNOSIS — R92.8 ABNORMAL MAMMOGRAM OF LEFT BREAST: Primary | ICD-10-CM

## 2022-02-23 NOTE — TELEPHONE ENCOUNTER
----- Message from Lena Mcclendon sent at 2/23/2022 10:39 AM EST -----  Subject: Referral Request    QUESTIONS   Reason for referral request? [place] [facility] is calling   in regards to needing a order put in for a Left breast diagnostic   MAMMOGRAM and ultrasound. R92.8 is the diagnostic code FAX#[phone]   call back Nkechi Paulson if ay questions   Has the physician seen you for this condition before? No   Preferred Specialist (if applicable)? Do you already have an appointment scheduled? No  Additional Information for Provider?   ---------------------------------------------------------------------------  --------------  CALL BACK INFO  What is the best way for the office to contact you? OK to leave message on   voicemail  Preferred Call Back Phone Number?  [phone]

## 2022-03-07 ENCOUNTER — OFFICE VISIT (OUTPATIENT)
Dept: FAMILY MEDICINE CLINIC | Age: 57
End: 2022-03-07
Payer: COMMERCIAL

## 2022-03-07 VITALS
BODY MASS INDEX: 36.38 KG/M2 | DIASTOLIC BLOOD PRESSURE: 70 MMHG | WEIGHT: 205.4 LBS | SYSTOLIC BLOOD PRESSURE: 128 MMHG | HEART RATE: 82 BPM | TEMPERATURE: 97.3 F | OXYGEN SATURATION: 100 %

## 2022-03-07 DIAGNOSIS — N81.10 FEMALE CYSTOCELE: ICD-10-CM

## 2022-03-07 DIAGNOSIS — N89.8 VAGINAL DISCHARGE: ICD-10-CM

## 2022-03-07 DIAGNOSIS — R30.0 DYSURIA: Primary | ICD-10-CM

## 2022-03-07 LAB
APPEARANCE FLUID: CLEAR
BILIRUBIN, POC: NORMAL
BLOOD URINE, POC: NORMAL
CLARITY, POC: CLEAR
COLOR, POC: YELLOW
GLUCOSE URINE, POC: NORMAL
KETONES, POC: NORMAL
LEUKOCYTE EST, POC: NORMAL
NITRITE, POC: NORMAL
PH, POC: 6.5
PROTEIN, POC: NORMAL
SPECIFIC GRAVITY, POC: 1.02
UROBILINOGEN, POC: 1

## 2022-03-07 PROCEDURE — 99213 OFFICE O/P EST LOW 20 MIN: CPT | Performed by: FAMILY MEDICINE

## 2022-03-07 PROCEDURE — 81002 URINALYSIS NONAUTO W/O SCOPE: CPT | Performed by: FAMILY MEDICINE

## 2022-03-07 RX ORDER — DICLOFENAC SODIUM 75 MG/1
TABLET, DELAYED RELEASE ORAL
COMMUNITY
Start: 2022-02-16 | End: 2022-08-29

## 2022-03-07 NOTE — PROGRESS NOTES
Here with concerns for UTI. Has had some dark urine    Symptoms started a couple of weeks. Frequency: Yes  Urgency: Yes  Burning: Yes  Blood in urine: No  Abd pain: Yes  Back pain: Yes  Nausea: No  Vomiting: No  Bowel changes :No  Fever: No  Vaginal discharge: Yes  Odor: Yes    Has tried nothing for symptoms. Has not been sexually active in many months      Body mass index is 36.38 kg/m². Vitals:    03/07/22 1654   BP: 128/70   Site: Left Upper Arm   Position: Sitting   Cuff Size: Large Adult   Pulse: 82   Temp: 97.3 °F (36.3 °C)   TempSrc: Infrared   SpO2: 100%   Weight: 205 lb 6.4 oz (93.2 kg)     Wt Readings from Last 3 Encounters:   03/07/22 205 lb 6.4 oz (93.2 kg)   09/29/21 202 lb 6.4 oz (91.8 kg)   09/28/21 202 lb 6.4 oz (91.8 kg)           GENERAL:Alert and oriented x 4 NAD, affect appropriate and obese, well hydrated, well developed. ABD:  soft, non-tender, non-distended, no masses, no rebound, no guarding, no organomegaly   : + cystocele, no redness swelling or discharge noted      ASSESSMENT AND PLAN:       Chico Luna was seen today for urinary tract infection.     Diagnoses and all orders for this visit:    Dysuria  -     POCT Urinalysis no Micro - UA ok, cx sent    -     Culture, Urine  -     Vaginal Pathogens Probes *A    Vaginal discharge  -     Vaginal Pathogens Probes *A    Female cystocele  F.u with surgeon when back home in [place]        Portions of Note per  Ermelinda Butler CMA AAMA with corrections and edits per Rickey Devlin MD.  I agree with entirety of note and was present and performed history and physical.  I also confirm that the note above accurately reflects all work, treatment, procedures, and medical decision making performed by me, Rickey Devlin MD

## 2022-03-07 NOTE — PATIENT INSTRUCTIONS
Urogynecologist - specialize in female prolapse problems. Cystocele: Care Instructions  Your Care Instructions     Cystocele happens when the bladder sags or presses into the vagina. It is also called bladder prolapse. Pregnancy, pelvic surgery, or being overweight may damage the muscles and other support tissues in your pelvis. Or the muscles and tissues may get weaker as you age. These can make the bladder sag. This may cause uncomfortable pressure in your vagina. A cystocele usually does not cause serious health problems. You may find relief by making lifestyle changes and doing exercises to make the pelvic muscles stronger. Talk to your doctor about steps you can take to reduce symptoms. If your symptoms go on, you may want to talk to your doctor about surgery. Follow-up care is a key part of your treatment and safety. Be sure to make and go to all appointments, and call your doctor if you are having problems. It's also a good idea to know your test results and keep a list of the medicines you take. How can you care for yourself at home? · Do not lift heavy objects or do anything that puts pressure on your pelvic muscles. · Do pelvic floor (Kegel) exercises. These tighten and strengthen pelvic muscles. ? Squeeze the same muscles you would use to stop your urine. Your belly and thighs should not move. ? Hold the squeeze for 3 seconds. Then relax for 3 seconds. ? Start with 3 seconds. Then add 1 second each week until you are able to squeeze for 10 seconds. ? Repeat the exercise 10 to 15 times in each session. Do 3 or more sessions a day. · Lie down and put a pillow under your knees. This eases pressure on your vagina. You also can lie on your side and bring your knees up to your chest.  · Ask your doctor about a vaginal pessary. You can place this in your vagina. It supports the bladder. Your doctor can teach you how and when to remove, clean, and reinsert it.   · If your doctor prescribes vaginal estrogen cream, use it exactly as prescribed. When should you call for help? Watch closely for changes in your health, and be sure to contact your doctor if you have any problems. Where can you learn more? Go to [email or web address] and sign in to your [employer] account. Enter S624 in the Soevolved box to learn more about \"Cystocele: Care Instructions. \"     If you do not have an account, please click on the \"Sign Up Now\" link. Current as of: February 11, 2021               Content Version: 13.1  © 1216-9046 Healthwise, Incorporated. Care instructions adapted under license by [address] If you have questions about a medical condition or this instruction, always ask your healthcare professional. [address] any warranty or liability for your use of this information.

## 2022-03-08 LAB
CANDIDA SPECIES, DNA PROBE: ABNORMAL
GARDNERELLA VAGINALIS, DNA PROBE: ABNORMAL
TRICHOMONAS VAGINALIS DNA: ABNORMAL
URINE CULTURE, ROUTINE: NORMAL

## 2022-03-08 RX ORDER — METRONIDAZOLE 500 MG/1
500 TABLET ORAL 2 TIMES DAILY
Qty: 14 TABLET | Refills: 0 | Status: SHIPPED | OUTPATIENT
Start: 2022-03-08 | End: 2022-03-15

## 2022-03-17 DIAGNOSIS — R92.8 ABNORMAL MAMMOGRAM OF LEFT BREAST: ICD-10-CM

## 2022-05-09 ENCOUNTER — TELEPHONE (OUTPATIENT)
Dept: ADMINISTRATIVE | Age: 57
End: 2022-05-09

## 2022-05-09 NOTE — TELEPHONE ENCOUNTER
PA COVER MY MEDS  Medication:Enbrel Mini 50MG/ML cartridges  Key:[id number]  Status:PENDING      Enbrel Mini 50MG/ML cartridges  CaseId:[id number];Status:Approved; Review Type:Prior Auth; Coverage Start Date:04/09/2022; Coverage End Date:05/09/2023;

## 2022-06-25 ENCOUNTER — PATIENT MESSAGE (OUTPATIENT)
Dept: FAMILY MEDICINE CLINIC | Age: 57
End: 2022-06-25

## 2022-06-25 DIAGNOSIS — I10 ESSENTIAL HYPERTENSION: ICD-10-CM

## 2022-06-27 NOTE — TELEPHONE ENCOUNTER
From: Carolee Ward  To: Dr. San Bolton: 6/25/2022 12:46 AM EDT  Subject: Med for my blood pressure     Hi my name is Mainor Farooq was a patient of dr Erick Pierre I want to continue coming there I live out of now though I dont a doctor here I needed a refill for the meds I was taking for blood pressure my blood is high

## 2022-06-28 DIAGNOSIS — M06.09 RHEUMATOID ARTHRITIS OF MULTIPLE SITES WITH NEGATIVE RHEUMATOID FACTOR (HCC): ICD-10-CM

## 2022-06-28 RX ORDER — AMLODIPINE BESYLATE 5 MG/1
5 TABLET ORAL DAILY
Qty: 30 TABLET | Refills: 5 | Status: SHIPPED | OUTPATIENT
Start: 2022-06-28 | End: 2022-09-01 | Stop reason: DRUGHIGH

## 2022-06-28 NOTE — TELEPHONE ENCOUNTER
Medication:   Requested Prescriptions     Pending Prescriptions Disp Refills    amLODIPine (NORVASC) 5 MG tablet 30 tablet 5     Sig: Take 1 tablet by mouth daily          Patient Phone Number: [phone] (home) [phone] (work)    Last appt: 3/7/2022   Next appt: Visit date not found    Last OARRS: No flowsheet data found.   PDMP Monitoring:    Last PDMP Jayde Rodrigez as Reviewed [facility]):  Review User Review Instant Review Result          Preferred Pharmacy:   Julita Angela [facility] [address] [facility], [address] [facility] [phone]  [address] [place] [address]  Phone: [phone] Fax: [phone]    [place] [address] [address] [facility],[address]Th Floor, [address] [facility] [phone] Barbra Acevedo [phone]  [facility] [facility] [address]  Phone: [phone] Fax: [phone]    [place] [address] [address] [facility] [phone] Shajicheco Curtis [phone]  [facility] [address]  Phone: [phone] Fax: [phone]    [facility] (Specialty) [address] [facility], [address] - [address] - [address] [phone]  [facility] [address] [address]  Phone: [phone] Fax: Brian Salazar [address], [facility] [address] [phone] Barbra Acevedo [phone]  [facility] [address]  Phone: [phone] Fax: [phone]

## 2022-06-30 RX ORDER — PREDNISONE 1 MG/1
TABLET ORAL
Qty: 40 TABLET | Refills: 0 | OUTPATIENT
Start: 2022-06-30

## 2022-08-29 ENCOUNTER — OFFICE VISIT (OUTPATIENT)
Dept: RHEUMATOLOGY | Age: 57
End: 2022-08-29
Payer: COMMERCIAL

## 2022-08-29 VITALS
HEIGHT: 62 IN | BODY MASS INDEX: 34.78 KG/M2 | WEIGHT: 189 LBS | DIASTOLIC BLOOD PRESSURE: 82 MMHG | SYSTOLIC BLOOD PRESSURE: 122 MMHG

## 2022-08-29 DIAGNOSIS — Z51.11 MAINTENANCE CHEMOTHERAPY: ICD-10-CM

## 2022-08-29 DIAGNOSIS — L50.9 HIVES: ICD-10-CM

## 2022-08-29 DIAGNOSIS — M06.09 RHEUMATOID ARTHRITIS OF MULTIPLE SITES WITH NEGATIVE RHEUMATOID FACTOR (HCC): Primary | ICD-10-CM

## 2022-08-29 DIAGNOSIS — M15.9 PRIMARY OSTEOARTHRITIS INVOLVING MULTIPLE JOINTS: ICD-10-CM

## 2022-08-29 LAB
ALT SERPL-CCNC: 19 U/L (ref 10–40)
AST SERPL-CCNC: 16 U/L (ref 15–37)
BASOPHILS ABSOLUTE: 0 K/UL (ref 0–0.2)
BASOPHILS RELATIVE PERCENT: 0.3 %
CREAT SERPL-MCNC: 0.8 MG/DL (ref 0.6–1.1)
EOSINOPHILS ABSOLUTE: 0 K/UL (ref 0–0.6)
EOSINOPHILS RELATIVE PERCENT: 0.6 %
GFR AFRICAN AMERICAN: >60
GFR NON-AFRICAN AMERICAN: >60
HCT VFR BLD CALC: 36.3 % (ref 36–48)
HEMOGLOBIN: 12.3 G/DL (ref 12–16)
LYMPHOCYTES ABSOLUTE: 2.6 K/UL (ref 1–5.1)
LYMPHOCYTES RELATIVE PERCENT: 39.6 %
MCH RBC QN AUTO: 29.7 PG (ref 26–34)
MCHC RBC AUTO-ENTMCNC: 34.1 G/DL (ref 31–36)
MCV RBC AUTO: 87.1 FL (ref 80–100)
MONOCYTES ABSOLUTE: 0.4 K/UL (ref 0–1.3)
MONOCYTES RELATIVE PERCENT: 5.8 %
NEUTROPHILS ABSOLUTE: 3.5 K/UL (ref 1.7–7.7)
NEUTROPHILS RELATIVE PERCENT: 53.7 %
PDW BLD-RTO: 14.8 % (ref 12.4–15.4)
PLATELET # BLD: 319 K/UL (ref 135–450)
PMV BLD AUTO: 7.7 FL (ref 5–10.5)
RBC # BLD: 4.16 M/UL (ref 4–5.2)
WBC # BLD: 6.5 K/UL (ref 4–11)

## 2022-08-29 PROCEDURE — 99214 OFFICE O/P EST MOD 30 MIN: CPT | Performed by: INTERNAL MEDICINE

## 2022-08-29 RX ORDER — MELOXICAM 15 MG/1
TABLET ORAL
Qty: 30 TABLET | Refills: 5 | Status: SHIPPED | OUTPATIENT
Start: 2022-08-29

## 2022-08-29 RX ORDER — ETANERCEPT 50 MG/ML
50 SOLUTION SUBCUTANEOUS WEEKLY
Qty: 4 EACH | Refills: 5 | Status: SHIPPED | OUTPATIENT
Start: 2022-08-29

## 2022-08-29 RX ORDER — METHYLPREDNISOLONE 4 MG/1
TABLET ORAL
Qty: 1 KIT | Refills: 0 | Status: SHIPPED | OUTPATIENT
Start: 2022-08-29 | End: 2022-09-04

## 2022-08-29 NOTE — PROGRESS NOTES
2022  Patient Name: Bonita Church  : 1965  Medical Record: [id number]    MEDICATIONS  Current Outpatient Medications   Medication Sig Dispense Refill    Etanercept (ENBREL MINI) 50 MG/ML SOCT Inject 50 mg into the skin once a week 4 each 5    meloxicam (MOBIC) 15 MG tablet Take 1 tablet by mouth daily. 30 tablet 5    methylPREDNISolone (MEDROL DOSEPACK) 4 MG tablet Use as directed 1 kit 0    amLODIPine (NORVASC) 5 MG tablet Take 1 tablet by mouth daily 30 tablet 5     Current Facility-Administered Medications   Medication Dose Route Frequency Provider Last Rate Last Admin    triamcinolone acetonide (KENALOG-40) injection 80 mg  80 mg Intra-artICUlar Once Vicky Hickman MD        triamcinolone acetonide (KENALOG-40) injection 80 mg  80 mg Intra-artICUlar Once Vicky Hickman MD        lidocaine 2 % injection 2 mL  2 mL Other Once Vicky Hickman MD        lidocaine 2 % injection 2 mL  2 mL Other Once Vicky Hickman MD        lidocaine PF 1 % injection 2 mL  2 mL Intra-artICUlar Once Vicky Hickman MD        triamcinolone acetonide (KENALOG-40) injection 80 mg  80 mg Intra-artICUlar Once Vicky Hickman MD           ALLERGIES  No Known Allergies      Comments  No specialty comments available. Background history:  Bonita Church is a 65 y.o. female who is being seen for follow up evaluation of Joint pain. She is complaining of pain in hands, left hip and left knee. Pain in the hands started 2 years ago and is progressively getting worse. She describes her pain as constant, aching, 5-6 out of 10 without any significant relieving or aggravating factors. Pain is located in PIP, MCP, DIP joints. Pain in the left hip is located on the lateral side and gets worse on laying on the left side. Knees crack and pop. Knee pain gets worse going up and down the steps. She has swelling in the knuckles, stiffness throughout the day. She also has difficulty opening doorknobs and jar cans.   She is on meloxicam 7.5 mg daily without improvement. She denies any history of psoriasis, inflammatory bowel disease, inflammatory back pain, uveitis, enthesitis, tenosynovitis or dactylitis. Interim history: She presents for follow-up of Osteoarthritis and rheumatoid arthritis. She is on Enbrel 50 mg once a week and meloxicam 15 mg daily. She was last seen in September 2021. She is scheduled for right knee replacement. She has osteoarthritis in left knee as well. She has received corticosteroid and gel injections in both knees in the past without any improvement. She denies any other joint pain, swelling and stiffness. She moved to [facility] but is not able to find a rheumatologist.  He is having recurrent hives for past few days. Hives are associated with itching. She denies any shortness of breath, chest pain or throat swelling. She has had similar episodes in the past.       She is off of methotrexate. She did not tolerate sulfasalazine [developed nausea, abdominal pain and diarrhea]. She did not tolerate Arava either [hair loss]. X-rays of the hands consistent with erosive osteoarthritis. Knee x-rays mild degenerative changes. Blood work shows low titer positive CCP, elevated ESR and CRP. Knee Pain     ROS    REVIEW OF SYSTEMS:   Constitutional: No unanticipated weight loss or fevers. No fatigue and malaise. Integumentary: No photosensitivity, malar rash, livedo reticularis, alopecia and Raynaud's symptoms, sclerodactyly, skin tightening  Eyes: negative for dry eyes, visual disturbance and persistent redness, discharge from eyes   ENT: - No tinnitus, loss of hearing, vertigo, or recurrent ear infections.  - No history of nasal/oral ulcers. - No history of sicca symptoms. Cardiovascular: No history of pericarditis, chest pain or murmur or palpitations  Respiratory: No shortness of breath, cough or history of interstitial lung disease. No history of pleurisy.  No history of tuberculosis or atypical infections. Gastrointestinal: No history of dysphagia or esophageal dysmotility. No change in bowel habits or any inflammatory bowel disease. Genitourinary: No history renal disease, miscarriages. Hematologic/Lymphatic: No  bleeding, blood clots or swollen lymph nodes. Neurological: No history seizure or focal weakness. No history of neuropathies, paresthesias or hyperesthesias, facial droop, diplopia  Psychiatric: No history of bipolar disease, anxiety, depression  Endocrine: Denies any thyroid / parathyroid disease and osteoporosis  Allergic/Immunologic: No nasal congestion         I have reviewed patients Past medical History, Social History and Family History as mentioned in her chart and this remains unchanged from previous.     Past Medical History:   Diagnosis Date    Anemia     Fibroids     Rheumatoid arthritis ([facility] [address]      Past Surgical History:   Procedure Laterality Date    HYSTERECTOMY (CERVIX STATUS UNKNOWN)  10/2013    [facility], lso    TOOTH EXTRACTION  04/2018     Social History     Socioeconomic History    Marital status: Single     Spouse name: Not on file    Number of children: Not on file    Years of education: Not on file    Highest education level: Not on file   Occupational History    Not on file   Tobacco Use    Smoking status: Never    Smokeless tobacco: Never   Vaping Use    Vaping Use: Never used   Substance and Sexual Activity    Alcohol use: Yes     Comment: RARELY    Drug use: No    Sexual activity: Yes     Partners: Male   Other Topics Concern    Not on file   Social History Narrative    Not on file     Social Determinants of Health     Financial Resource Strain: Not on file   Food Insecurity: Not on file   Transportation Needs: Not on file   Physical Activity: Not on file   Stress: Not on file   Social Connections: Not on file   Intimate Partner Violence: Not on file   Housing Stability: Not on file     Family History   Problem Relation Age of Onset    Diabetes Mother     Hypertension Mother     Heart Failure Mother     Heart Attack Mother     Diabetes Father     Cancer Sister         colon    Rheum Arthritis Neg Hx     Osteoarthritis Neg Hx     Asthma Neg Hx     Breast Cancer Neg Hx     High Cholesterol Neg Hx     Migraines Neg Hx     Ovarian Cancer Neg Hx     Rashes/Skin Problems Neg Hx     Seizures Neg Hx     Stroke Neg Hx     Thyroid Disease Neg Hx          PHYSICAL EXAM   Vitals:    08/29/22 0855   BP: 122/82   Site: Left Upper Arm   Position: Sitting   Cuff Size: Medium Adult   Weight: 189 lb (85.7 kg)   Height: 5' 2\" (1.575 m)     Physical Exam  Constitutional:  Well developed, well nourished, no acute distress, non-toxic appearance   Musculoskeletal:    RIGHT  Swell  Tender  ROM  LEFT  Swell  Tender  ROM    DIP2  0  0  Heberden   0  0  Heberden    DIP3  0  0  Heberden   0  0  Heberden    DIP4  0  0  Heberden   0  0  Heberden    DIP5  0  0  Heberden   0  0  Heberden    PIP1  + 0 Bony change   + 0 Bony change    PIP2  + 0 Bony change   + 0 Bony change    PIP3  + 0 Bony change   + 0 Bony change    PIP4  + 0 Bony change   ++ + Bony change    PIP5  + 0 Bony change   0 0 Bony change    MCP1  0  0  FULL   0  0  FULL    MCP2  0  0  FULL   0  0  FULL    MCP3  0  0  FULL   0  0  FULL    MCP4  0  0  FULL   0  0  FULL    MCP5  0  0  FULL   0  0  FULL    Wrist  0 0 FULL   0  0  FULL    Elbow  0  0  FULL   0  0  FULL    Shouldr  0  0  FULL   0  0  FULL    Hip  0  0  FULL   0  + FULL    Knee  0 0 Crepitus++  0 + Crepitus++   Ankle  0  0  FULL   0  0  FULL    MTP1  0  0  FULL   0  0  FULL    MTP2  0  0  FULL   0  0  FULL    MTP3  0  0  FULL   0  0  FULL    MTP4  0  0  FULL   0  0  FULL    MTP5  0  0  FULL   0  0  FULL    IP1  0  0  FULL   0  0  FULL    IP2  0  0  FULL   0  0  FULL    IP3  0  0  FULL   0  0  FULL    IP4  0  0  FULL   0  0  FULL    IP5  0  0  FULL   0 0 FULL     Ambulates without assistance, normal gait  Neck: Full ROM, no tenderness, supple   Back- no tenderness.   Eyes:  PERRL, extra ocular movements intact, conjunctiva normal   HEENT:  Atraumatic, normocephalic, external ears normal, oropharynx moist, no pharyngeal exudates. Respiratory:  No respiratory distress  GI:  Soft, nondistended, normal bowel sounds, nontender, no organomegaly, no mass, no rebound, no guarding   :  No costovertebral angle tenderness   Integument:  Well hydrated, no telangiectasias  Lymphatic:  No lymphadenopathy noted   Neurologic:   Alert & oriented x 3, CN 2-12 normal, no focal deficits noted. Sensations Intact. Muscle strength 5/5 proximally and distally in upper and lower extremities.    Psychiatric:  Speech and behavior appropriate           LABS AND IMAGING  Outside data reviewed and in HPI    Lab Results   Component Value Date/Time    WBC 4.9 09/29/2021 10:40 AM    RBC 4.24 09/29/2021 10:40 AM    HGB 11.9 09/29/2021 10:40 AM    HCT 36.7 09/29/2021 10:40 AM     09/29/2021 10:40 AM    MCV 87 09/29/2021 10:40 AM    MCH 28.1 09/29/2021 10:40 AM    MCHC 32.4 09/29/2021 10:40 AM    RDW 13.9 09/29/2021 10:40 AM    NRBC 1 05/26/2020 09:47 AM    NRBC 1 05/26/2020 09:47 AM    SEGSPCT 37 09/29/2021 10:40 AM    LYMPHOPCT 52 09/29/2021 10:40 AM    MONOPCT 7 09/29/2021 10:40 AM    BASOPCT 1 09/29/2021 10:40 AM    MONOSABS 0.3 09/29/2021 10:40 AM    LYMPHSABS 2.5 09/29/2021 10:40 AM    EOSABS 0.1 09/29/2021 10:40 AM    BASOSABS 0.1 09/29/2021 10:40 AM       Chemistry        Component Value Date/Time     09/29/2021 1040    K 4.0 09/29/2021 1040    K 3.5 03/04/2020 0501     09/29/2021 1040    CO2 23 09/29/2021 1040    BUN 13 09/29/2021 1040    CREATININE 0.70 09/29/2021 1040        Component Value Date/Time    CALCIUM 9.9 09/29/2021 1040    ALKPHOS 126 (H) 09/29/2021 1040    AST 17 09/29/2021 1040    ALT 14 09/29/2021 1040    BILITOT 0.3 09/29/2021 1040          Lab Results   Component Value Date    SEDRATE 43 (H) 02/24/2020     Lab Results   Component Value Date    CRP 18.6 (H) 02/24/2020     No results found for: JADEN, SHANIQUE, SSA, SSB, C3, C4  Lab Results   Component Value Date/Time    RF <10.0 03/16/2018 10:58 AM    CCPABIGG 26 03/16/2018 10:58 AM     No results found for: JADEN, ANATITER, ANAINT, PATH  No results found for: [facility], DSDNAIGGIFA  Lab Results   Component Value Date/Time    SSALAAB 0 03/16/2018 10:58 AM     No results found for: SMAB, RNPAB  No results found for: CENTABIGG  No results found for: C3, C4, ACE  No results found for: JO1, VITD25, ANI63HWMFF    Degenerative signal throughout the posterior horn of the medial meniscus with   blunting of the inner free edge. No fluid like signal to suggest discrete   tear. Low-grade MCL injury. Small to moderate joint effusion. Chondromalacia most prominent medial compartment. Small semimembranosus gastrocnemius bursal cyst.         ASSESSMENT AND PLAN      Assessment/Plan:      ASSESSMENT:    1. Rheumatoid arthritis of multiple sites with negative rheumatoid factor ([facility] [address]    2. Maintenance chemotherapy    3. Primary osteoarthritis involving multiple joints    4. Hives        PLAN:    Diagnosis Orders   1. Rheumatoid arthritis of multiple sites with negative rheumatoid factor (HCC)  Etanercept (ENBREL MINI) 50 MG/ML SOCT      2. Maintenance chemotherapy  Creatinine, Serum    CBC Auto Differential    AST    ALT      3. Primary osteoarthritis involving multiple joints  meloxicam (MOBIC) 15 MG tablet      4. Hives          . DIAG  1. Rheumatoid arthritis of multiple sites with negative rheumatoid factor (HCC)  Low titer positive CCP, elevated ESR and CRP and negative RF, hand x-rays with erosive osteoarthritis  -Stable  Continue Enbrel 50 mg once a week  Past medications:    -Discontinued leflunomide due to hair loss and sulfasalazine due to nausea and diarrhea  -No significant improvement with methotrexate        2. Primary osteoarthritis involving multiple joints  Symptomatic in bilateral knees.     No significant improvement with gel and corticosteroid injections. Scheduled for right knee replacement. Continue meloxicam 15 mg daily    3. Maintenance chemotherapy  Labs reviewed. Recommend flu, pneumonia, shingles vaccine      Return in about 6 months (around 2/28/2023). The patient indicates understanding of these issues and agrees with the plan. Return in about 6 months (around 2/28/2023). The risks and benefits of my recommendations, as well as other treatment options, benefits and side effects were discussed with the patient. All questions were answered. ######################################################################    I thank you for giving me the opportunity to participate in [facility]. If you have any questions or concerns please feel free to contact me. I look forward to following  Bland Holter along with you. Electronically signed by: Marie Suazo MD, MD, 8/29/2022 9:20 AM    Documentation was done using voice recognition dragon software. Every effort was made to ensure accuracy; however, inadvertent unintentional computerized transcription errors may be present.

## 2022-09-01 ENCOUNTER — OFFICE VISIT (OUTPATIENT)
Dept: FAMILY MEDICINE CLINIC | Age: 57
End: 2022-09-01
Payer: COMMERCIAL

## 2022-09-01 VITALS
HEART RATE: 105 BPM | WEIGHT: 189 LBS | TEMPERATURE: 97.7 F | BODY MASS INDEX: 34.57 KG/M2 | SYSTOLIC BLOOD PRESSURE: 148 MMHG | DIASTOLIC BLOOD PRESSURE: 82 MMHG | OXYGEN SATURATION: 99 %

## 2022-09-01 DIAGNOSIS — R30.0 DYSURIA: Primary | ICD-10-CM

## 2022-09-01 DIAGNOSIS — N81.10 FEMALE CYSTOCELE: ICD-10-CM

## 2022-09-01 DIAGNOSIS — I10 ESSENTIAL HYPERTENSION: ICD-10-CM

## 2022-09-01 LAB
BACTERIA URINE, POC: 0
BILIRUBIN URINE: 0 MG/DL
BLOOD, URINE: POSITIVE
CASTS URINE, POC: 0
CLARITY: CLEAR
COLOR: YELLOW
CRYSTALS URINE, POC: 0
EPI CELLS URINE, POC: NORMAL
GLUCOSE URINE: NEGATIVE
KETONES, URINE: NEGATIVE
LEUKOCYTE EST, POC: NEGATIVE
NITRITE, URINE: NEGATIVE
PH UA: 5 (ref 4.5–8)
PROTEIN UA: NEGATIVE
RBC URINE, POC: NORMAL
SPECIFIC GRAVITY UA: 1.02 (ref 1–1.03)
UROBILINOGEN, URINE: NORMAL
WBC URINE, POC: 0
YEAST URINE, POC: 0

## 2022-09-01 PROCEDURE — 99214 OFFICE O/P EST MOD 30 MIN: CPT | Performed by: NURSE PRACTITIONER

## 2022-09-01 PROCEDURE — 81000 URINALYSIS NONAUTO W/SCOPE: CPT | Performed by: NURSE PRACTITIONER

## 2022-09-01 RX ORDER — AMLODIPINE BESYLATE 10 MG/1
10 TABLET ORAL DAILY
Qty: 90 TABLET | Refills: 0 | Status: SHIPPED | OUTPATIENT
Start: 2022-09-01

## 2022-09-01 ASSESSMENT — PATIENT HEALTH QUESTIONNAIRE - PHQ9
SUM OF ALL RESPONSES TO PHQ QUESTIONS 1-9: 0
SUM OF ALL RESPONSES TO PHQ QUESTIONS 1-9: 0
SUM OF ALL RESPONSES TO PHQ9 QUESTIONS 1 & 2: 0
1. LITTLE INTEREST OR PLEASURE IN DOING THINGS: 0
2. FEELING DOWN, DEPRESSED OR HOPELESS: 0
SUM OF ALL RESPONSES TO PHQ QUESTIONS 1-9: 0
SUM OF ALL RESPONSES TO PHQ QUESTIONS 1-9: 0

## 2022-09-01 NOTE — PATIENT INSTRUCTIONS
Urogynecologist - Surgeon. [address] [place] [address]   [phone]    Need fax number sent via [employer] for referral if needed. Increase blood pressure medication to 10mg and send readings in 2 weeks.

## 2022-09-01 NOTE — PROGRESS NOTES
Bethann Kehr  : 1965  Encounter date: 2022    This is a 65 y.o. female who presents with  Chief Complaint   Patient presents with    Dysuria     Lower abdominal pain with burning with urination. Been going on for about 3 weeks. History of present illness:    HPI   Presents to clinic today with concerns for possible UTI that started approximately 3 weeks prior. Reports lower abdominal discomfort along with burning with urination that is intermittent - reports won't be every day, but twice weekly. Has previously had a cystocele. Has not followed with Urogynecology yet. Does currently live in [address] [place] [address] - had moved there to care for her mother. Will be having a knee replacement in October and then plans to return after. Blood pressure readings recently have been higher in the 140s-150s/80s-90s. Has been compliant with taking amlodipine daily. Feels the readings have been elevated off and on for the past several weeks to months. Was out of amlodipine for a short period of time - 1 week and then restarted. No Known Allergies  Current Outpatient Medications   Medication Sig Dispense Refill    amLODIPine (NORVASC) 10 MG tablet Take 1 tablet by mouth daily 90 tablet 0    Etanercept (ENBREL MINI) 50 MG/ML SOCT Inject 50 mg into the skin once a week 4 each 5    meloxicam (MOBIC) 15 MG tablet Take 1 tablet by mouth daily.  30 tablet 5    methylPREDNISolone (MEDROL DOSEPACK) 4 MG tablet Use as directed 1 kit 0     Current Facility-Administered Medications   Medication Dose Route Frequency Provider Last Rate Last Admin    triamcinolone acetonide (KENALOG-40) injection 80 mg  80 mg Intra-artICUlar Once Sudha Ball MD        triamcinolone acetonide (KENALOG-40) injection 80 mg  80 mg Intra-artICUlar Once Sudha Ball MD        lidocaine 2 % injection 2 mL  2 mL Other Once Sudha Ball MD        lidocaine 2 % injection 2 mL  2 mL Other Once Sudha Ball MD        lidocaine PF 1 % injection 2 mL 2 mL Intra-artICUlar Once [place]us Bay MD        triamcinolone acetonide (KENALOG-40) injection 80 mg  80 mg Intra-artICUlar Once Mateus Bay MD          Review of Systems   Constitutional:  Negative for activity change, appetite change, chills, fatigue and fever. Respiratory:  Negative for cough and shortness of breath. Cardiovascular:  Negative for chest pain and palpitations. Gastrointestinal:  Negative for diarrhea, nausea and vomiting. Past medical, surgical, family and social history were reviewed and updated with the patient. Objective:    BP (!) 148/82 (Site: Left Upper Arm, Position: Sitting, Cuff Size: Medium Adult)   Pulse (!) 105   Temp 97.7 °F (36.5 °C)   Wt 189 lb (85.7 kg)   LMP 09/09/2013   SpO2 99%   BMI 34.57 kg/m²   Weight: 189 lb (85.7 kg)     BP Readings from Last 3 Encounters:   09/01/22 (!) 148/82   08/29/22 122/82   03/07/22 128/70     Wt Readings from Last 3 Encounters:   09/01/22 189 lb (85.7 kg)   08/29/22 189 lb (85.7 kg)   03/07/22 205 lb 6.4 oz (93.2 kg)     Physical Exam  Constitutional:       General: She is not in acute distress. Appearance: She is well-developed. HENT:      Head: Normocephalic and atraumatic. Cardiovascular:      Rate and Rhythm: Normal rate and regular rhythm. Heart sounds: Normal heart sounds, S1 normal and S2 normal.   Pulmonary:      Effort: Pulmonary effort is normal. No respiratory distress. Breath sounds: Normal breath sounds. Abdominal:      General: Abdomen is flat. Bowel sounds are normal.      Palpations: Abdomen is soft. Tenderness: There is abdominal tenderness in the suprapubic area. There is no guarding or rebound. Skin:     General: Skin is warm and dry. Neurological:      Mental Status: She is alert and oriented to person, place, and time. Psychiatric:         Thought Content:  Thought content normal.         Judgment: Judgment normal.     Results for POC orders placed in visit on 09/01/22   POCT Urine with Microscopic   Result Value Ref Range    Color, UA Yellow     Clarity, UA Clear Clear    Glucose, Ur Negative     Bilirubin Urine 0 mg/dL    Ketones, Urine Negative     Specific Gravity, UA 1.025 1.005 - 1.030    Blood, Urine Positive     pH, UA 5.0 4.5 - 8.0    Protein, UA Negative Negative    Nitrite, Urine Negative     Leukocytes, UA Negative     Urobilinogen, Urine 0.2 mg/dL     RBC Urine, POC 1-2     WBC Urine, POC 0     Bacteria Urine, POC 0     yeast urine, poc 0     Casts Urine, POC 0     Epi Cells Urine, POC 1-2     crystals urine, poc 0      Assessment/Plan    1. Dysuria  U/A unremarkable. Symptoms most likely due to her cystocele. Advised follow up with urogynecology - given contact information for someone in her area. - POCT Urine with Microscopic    2. Female cystocele    3. Essential hypertension  Increase amlodipine to 10mg. Send blood pressure log in 2 weeks for monitoring via [employer]. - amLODIPine (NORVASC) 10 MG tablet; Take 1 tablet by mouth daily  Dispense: 90 tablet; Refill: 0       Cely Dianne was counseled regarding symptoms of current diagnosis, course and complications of disease if inadequately treated. Discussed side effects of medications, diagnosis, treatment options, and prognosis along with risks, benefits, complications, and alternatives of treatment including labs, imaging and other studies/treatment targets and goals. She verbalized understanding of instructions and counseling. Return in about 3 months (around 12/1/2022) for chronic health conditions. Medical decision making of moderate complexity.

## 2022-09-04 ASSESSMENT — ENCOUNTER SYMPTOMS
DIARRHEA: 0
COUGH: 0
NAUSEA: 0
SHORTNESS OF BREATH: 0
VOMITING: 0

## 2023-01-25 ENCOUNTER — PATIENT MESSAGE (OUTPATIENT)
Dept: RHEUMATOLOGY | Age: 58
End: 2023-01-25

## 2023-01-25 DIAGNOSIS — M06.09 RHEUMATOID ARTHRITIS OF MULTIPLE SITES WITH NEGATIVE RHEUMATOID FACTOR (HCC): ICD-10-CM

## 2023-01-27 DIAGNOSIS — I10 ESSENTIAL HYPERTENSION: ICD-10-CM

## 2023-01-30 RX ORDER — ETANERCEPT 50 MG/ML
50 SOLUTION SUBCUTANEOUS WEEKLY
Qty: 4 EACH | Refills: 5 | Status: SHIPPED | OUTPATIENT
Start: 2023-01-30

## 2023-01-30 RX ORDER — AMLODIPINE BESYLATE 10 MG/1
10 TABLET ORAL DAILY
Qty: 90 TABLET | Refills: 0 | OUTPATIENT
Start: 2023-01-30

## 2023-01-30 NOTE — TELEPHONE ENCOUNTER
From: Marisabel Polo  To: Dr. Padma Reyes: 1/25/2023 6:56 PM EST  Subject: Francis Hudson How are you when is my next appointment     I just change places were I get my medicine I need new perception different place they needed something I have a number to talk to them about my Enbral. Her name is Cassandra the number is [phone]

## 2023-02-13 ENCOUNTER — TELEPHONE (OUTPATIENT)
Dept: FAMILY MEDICINE CLINIC | Age: 58
End: 2023-02-13

## 2023-02-13 DIAGNOSIS — I10 ESSENTIAL HYPERTENSION: ICD-10-CM

## 2023-02-13 NOTE — TELEPHONE ENCOUNTER
----- Message from Soham sent at 2/13/2023 10:23 AM EST -----  Subject: Appointment Request    Reason for Call: Established Patient Appointment needed: Routine Existing   Condition Follow Up    QUESTIONS    Reason for appointment request? No appointments available during search     Additional Information for Provider? Patient would like to schedule appt   with Kassie Perry in a MONTH. Patient is [place] out of the [place] and   needs her medication refilled.    ---------------------------------------------------------------------------  --------------  Warden Sarkar [facility]  [phone]; OK to leave message on voicemail  ---------------------------------------------------------------------------  --------------  SCRIPT ANSWERS  TEEID Screen: Gisele Barragan

## 2023-02-15 RX ORDER — AMLODIPINE BESYLATE 10 MG/1
10 TABLET ORAL DAILY
Qty: 90 TABLET | Refills: 0 | Status: SHIPPED | OUTPATIENT
Start: 2023-02-15

## 2023-02-15 NOTE — TELEPHONE ENCOUNTER
Medication:   Requested Prescriptions     Pending Prescriptions Disp Refills    amLODIPine (NORVASC) 10 MG tablet 90 tablet 0     Sig: Take 1 tablet by mouth daily      Last Filled:      Patient Phone Number: [phone] (home) [phone] (work)    Last appt: 9/1/2022   Next appt: 3/20/2023    Last OARRS: No flowsheet data found.   PDMP Monitoring:    Last PDMP Angela Chen as Reviewed [facility]):  Review User Review Instant Review Result          Preferred Pharmacy:     Luda Dash [address] [phone]  [facility] [address]  Phone: [phone] Fax: [phone]

## 2023-03-20 ENCOUNTER — TELEPHONE (OUTPATIENT)
Dept: FAMILY MEDICINE CLINIC | Age: 58
End: 2023-03-20

## 2023-03-20 ENCOUNTER — HOSPITAL ENCOUNTER (OUTPATIENT)
Dept: GENERAL RADIOLOGY | Age: 58
Discharge: HOME | End: 2023-03-20
Payer: COMMERCIAL

## 2023-03-20 ENCOUNTER — HOSPITAL ENCOUNTER (OUTPATIENT)
Age: 58
Discharge: HOME | End: 2023-03-20
Payer: COMMERCIAL

## 2023-03-20 ENCOUNTER — OFFICE VISIT (OUTPATIENT)
Dept: RHEUMATOLOGY | Age: 58
End: 2023-03-20
Payer: COMMERCIAL

## 2023-03-20 VITALS — DIASTOLIC BLOOD PRESSURE: 80 MMHG | SYSTOLIC BLOOD PRESSURE: 132 MMHG | WEIGHT: 188 LBS | BODY MASS INDEX: 34.39 KG/M2

## 2023-03-20 DIAGNOSIS — M06.09 RHEUMATOID ARTHRITIS OF MULTIPLE SITES WITH NEGATIVE RHEUMATOID FACTOR (HCC): Primary | ICD-10-CM

## 2023-03-20 DIAGNOSIS — I10 ESSENTIAL HYPERTENSION: ICD-10-CM

## 2023-03-20 DIAGNOSIS — M15.9 PRIMARY OSTEOARTHRITIS INVOLVING MULTIPLE JOINTS: ICD-10-CM

## 2023-03-20 DIAGNOSIS — Z51.11 MAINTENANCE CHEMOTHERAPY: ICD-10-CM

## 2023-03-20 LAB
BASOPHILS # BLD: 0 K/UL (ref 0–0.2)
BASOPHILS NFR BLD: 0.6 %
DEPRECATED RDW RBC AUTO: 15.1 % (ref 12.4–15.4)
EOSINOPHIL # BLD: 0 K/UL (ref 0–0.6)
EOSINOPHIL NFR BLD: 0.9 %
HCT VFR BLD AUTO: 32.7 % (ref 36–48)
HGB BLD-MCNC: 10.9 G/DL (ref 12–16)
LYMPHOCYTES # BLD: 2.5 K/UL (ref 1–5.1)
LYMPHOCYTES NFR BLD: 60.9 %
MCH RBC QN AUTO: 27.7 PG (ref 26–34)
MCHC RBC AUTO-ENTMCNC: 33.4 G/DL (ref 31–36)
MCV RBC AUTO: 83 FL (ref 80–100)
MONOCYTES # BLD: 0.3 K/UL (ref 0–1.3)
MONOCYTES NFR BLD: 8.6 %
NEUTROPHILS # BLD: 1.2 K/UL (ref 1.7–7.7)
NEUTROPHILS NFR BLD: 29 %
PLATELET # BLD AUTO: 274 K/UL (ref 135–450)
PMV BLD AUTO: 9.4 FL (ref 5–10.5)
RBC # BLD AUTO: 3.94 M/UL (ref 4–5.2)
WBC # BLD AUTO: 4.1 K/UL (ref 4–11)

## 2023-03-20 PROCEDURE — 99214 OFFICE O/P EST MOD 30 MIN: CPT | Performed by: INTERNAL MEDICINE

## 2023-03-20 PROCEDURE — 73560 X-RAY EXAM OF KNEE 1 OR 2: CPT

## 2023-03-20 PROCEDURE — 20610 DRAIN/INJ JOINT/BURSA W/O US: CPT | Performed by: INTERNAL MEDICINE

## 2023-03-20 RX ORDER — MELOXICAM 15 MG/1
TABLET ORAL
Qty: 30 TABLET | Refills: 5 | Status: SHIPPED | OUTPATIENT
Start: 2023-03-20 | End: 2023-03-20 | Stop reason: SDUPTHER

## 2023-03-20 RX ORDER — MELOXICAM 15 MG/1
TABLET ORAL
Qty: 30 TABLET | Refills: 5 | Status: SHIPPED | OUTPATIENT
Start: 2023-03-20

## 2023-03-20 RX ORDER — TRIAMCINOLONE ACETONIDE 40 MG/ML
40 INJECTION, SUSPENSION INTRA-ARTICULAR; INTRAMUSCULAR ONCE
Status: COMPLETED | OUTPATIENT
Start: 2023-03-20 | End: 2023-03-20

## 2023-03-20 RX ORDER — LIDOCAINE HYDROCHLORIDE 10 MG/ML
20 INJECTION, SOLUTION INFILTRATION; PERINEURAL ONCE
Status: COMPLETED | OUTPATIENT
Start: 2023-03-20 | End: 2023-03-20

## 2023-03-20 RX ORDER — ETANERCEPT 50 MG/ML
50 SOLUTION SUBCUTANEOUS WEEKLY
Qty: 4 EACH | Refills: 5 | Status: SHIPPED | OUTPATIENT
Start: 2023-03-20

## 2023-03-20 RX ORDER — AMLODIPINE BESYLATE 10 MG/1
10 TABLET ORAL DAILY
Qty: 30 TABLET | Refills: 0 | Status: SHIPPED | OUTPATIENT
Start: 2023-03-20

## 2023-03-20 RX ADMIN — LIDOCAINE HYDROCHLORIDE 20 ML: 10 INJECTION, SOLUTION INFILTRATION; PERINEURAL at 14:28

## 2023-03-20 RX ADMIN — TRIAMCINOLONE ACETONIDE 40 MG: 40 INJECTION, SUSPENSION INTRA-ARTICULAR; INTRAMUSCULAR at 14:35

## 2023-03-20 RX ADMIN — TRIAMCINOLONE ACETONIDE 40 MG: 40 INJECTION, SUSPENSION INTRA-ARTICULAR; INTRAMUSCULAR at 14:34

## 2023-03-20 RX ADMIN — LIDOCAINE HYDROCHLORIDE 20 ML: 10 INJECTION, SOLUTION INFILTRATION; PERINEURAL at 14:29

## 2023-03-20 NOTE — TELEPHONE ENCOUNTER
amLODIPine (NORVASC) 10 MG tablet [[id number]      [facility] [address] [place] [address], [address],[facility] - F [phone]

## 2023-03-20 NOTE — PROGRESS NOTES
history of neuropathies, paresthesias or hyperesthesias, facial droop, diplopia  Psychiatric: No history of bipolar disease, anxiety, depression  Endocrine: Denies any thyroid / parathyroid disease and osteoporosis  Allergic/Immunologic: No nasal congestion         I have reviewed patients Past medical History, Social History and Family History as mentioned in her chart and this remains unchanged from previous.     Past Medical History:   Diagnosis Date    Anemia     Fibroids     Rheumatoid arthritis ([facility] [address]      Past Surgical History:   Procedure Laterality Date    HYSTERECTOMY (CERVIX STATUS UNKNOWN)  10/2013    [facility], lso    TOOTH EXTRACTION  04/2018     Social History     Socioeconomic History    Marital status: Single     Spouse name: Not on file    Number of children: Not on file    Years of education: Not on file    Highest education level: Not on file   Occupational History    Not on file   Tobacco Use    Smoking status: Never    Smokeless tobacco: Never   Vaping Use    Vaping Use: Never used   Substance and Sexual Activity    Alcohol use: Yes     Comment: RARELY    Drug use: No    Sexual activity: Yes     Partners: Male   Other Topics Concern    Not on file   Social History Narrative    Not on file     Social Determinants of Health     Financial Resource Strain: Not on file   Food Insecurity: Not on file   Transportation Needs: Not on file   Physical Activity: Not on file   Stress: Not on file   Social Connections: Not on file   Intimate Partner Violence: Not on file   Housing Stability: Not on file     Family History   Problem Relation Age of Onset    Diabetes Mother     Hypertension Mother     Heart Failure Mother     Heart Attack Mother     Diabetes Father     Cancer Sister         colon    Rheum Arthritis Neg Hx     Osteoarthritis Neg Hx     Asthma Neg Hx     Breast Cancer Neg Hx     High Cholesterol Neg Hx     Migraines Neg Hx     Ovarian Cancer Neg Hx     Rashes/Skin Problems Neg Hx     Seizures Neg Hx

## 2023-03-21 LAB
ALT SERPL-CCNC: 11 U/L (ref 10–40)
AST SERPL-CCNC: 17 U/L (ref 15–37)
CREAT SERPL-MCNC: 0.6 MG/DL (ref 0.6–1.1)
GFR SERPLBLD CREATININE-BSD FMLA CKD-EPI: >60 ML/MIN/{1.73_M2}

## 2023-04-04 DIAGNOSIS — M06.09 RHEUMATOID ARTHRITIS OF MULTIPLE SITES WITH NEGATIVE RHEUMATOID FACTOR (HCC): ICD-10-CM

## 2023-04-04 RX ORDER — ETANERCEPT 50 MG/ML
50 SOLUTION SUBCUTANEOUS WEEKLY
Qty: 4 EACH | Refills: 5 | Status: CANCELLED | OUTPATIENT
Start: 2023-04-04

## 2023-04-04 NOTE — TELEPHONE ENCOUNTER
Terence Vargas from [facility] called. They are no long the pt's specialty pharmacy for the Enbrel. [facility]   Last appnt and labs 3/20/23  Next appnt not scheduled

## 2023-04-06 RX ORDER — ETANERCEPT 50 MG/ML
50 SOLUTION SUBCUTANEOUS WEEKLY
Qty: 4 EACH | Refills: 0 | Status: SHIPPED | OUTPATIENT
Start: 2023-04-06

## 2023-04-10 ENCOUNTER — TELEPHONE (OUTPATIENT)
Dept: RHEUMATOLOGY | Age: 58
End: 2023-04-10

## 2023-09-11 DIAGNOSIS — I10 ESSENTIAL HYPERTENSION: ICD-10-CM

## 2023-09-11 RX ORDER — AMLODIPINE BESYLATE 10 MG/1
10 TABLET ORAL DAILY
Qty: 90 TABLET | Refills: 0 | Status: SHIPPED | OUTPATIENT
Start: 2023-09-11 | End: 2023-10-23 | Stop reason: SDUPTHER

## 2023-09-11 NOTE — TELEPHONE ENCOUNTER
Medication:   Requested Prescriptions     Pending Prescriptions Disp Refills    amLODIPine (NORVASC) 10 MG tablet [Pharmacy Med Name: AMLODIPINE BESYLATE 10MG TABLETS] 30 tablet 0     Sig: TAKE 1 TABLET BY MOUTH DAILY      Last Filled:      Patient Phone Number: [phone] (home)     Last appt: 4/14/2023   Next appt: Visit date not found    Last OARRS:        No data to display              PDMP Monitoring:    Last PDMP Frostchristina Nett as Reviewed [facility]):  Review User Review Instant Review Result          Preferred Pharmacy:   [address] [address] [facility] [phone] - F [phone]  [address],[place] [address]  Phone: [phone] Fax: [phone]

## 2023-10-23 ENCOUNTER — OFFICE VISIT (OUTPATIENT)
Dept: FAMILY MEDICINE CLINIC | Age: 58
End: 2023-10-23
Payer: COMMERCIAL

## 2023-10-23 VITALS
TEMPERATURE: 98 F | OXYGEN SATURATION: 98 % | HEART RATE: 78 BPM | BODY MASS INDEX: 31.1 KG/M2 | WEIGHT: 169 LBS | HEIGHT: 62 IN | SYSTOLIC BLOOD PRESSURE: 128 MMHG | DIASTOLIC BLOOD PRESSURE: 70 MMHG

## 2023-10-23 DIAGNOSIS — M06.09 RHEUMATOID ARTHRITIS OF MULTIPLE SITES WITH NEGATIVE RHEUMATOID FACTOR (HCC): ICD-10-CM

## 2023-10-23 DIAGNOSIS — Z00.00 ANNUAL PHYSICAL EXAM: Primary | ICD-10-CM

## 2023-10-23 DIAGNOSIS — I10 ESSENTIAL HYPERTENSION: ICD-10-CM

## 2023-10-23 DIAGNOSIS — M15.9 PRIMARY OSTEOARTHRITIS INVOLVING MULTIPLE JOINTS: ICD-10-CM

## 2023-10-23 DIAGNOSIS — H61.23 BILATERAL IMPACTED CERUMEN: ICD-10-CM

## 2023-10-23 DIAGNOSIS — E55.9 VITAMIN D DEFICIENCY: ICD-10-CM

## 2023-10-23 DIAGNOSIS — R92.8 ABNORMAL MAMMOGRAM OF BOTH BREASTS: ICD-10-CM

## 2023-10-23 DIAGNOSIS — D50.9 IRON DEFICIENCY ANEMIA, UNSPECIFIED IRON DEFICIENCY ANEMIA TYPE: ICD-10-CM

## 2023-10-23 DIAGNOSIS — Z80.0 FAMILY HISTORY OF MALIGNANT NEOPLASM OF COLON IN RELATIVE DIAGNOSED WHEN YOUNGER THAN 50 YEARS OF AGE: ICD-10-CM

## 2023-10-23 DIAGNOSIS — Z12.11 SCREENING FOR MALIGNANT NEOPLASM OF COLON: ICD-10-CM

## 2023-10-23 DIAGNOSIS — E78.2 MIXED HYPERLIPIDEMIA: ICD-10-CM

## 2023-10-23 PROCEDURE — 99396 PREV VISIT EST AGE 40-64: CPT | Performed by: NURSE PRACTITIONER

## 2023-10-23 PROCEDURE — 3078F DIAST BP <80 MM HG: CPT | Performed by: NURSE PRACTITIONER

## 2023-10-23 PROCEDURE — 69210 REMOVE IMPACTED EAR WAX UNI: CPT | Performed by: NURSE PRACTITIONER

## 2023-10-23 PROCEDURE — 3074F SYST BP LT 130 MM HG: CPT | Performed by: NURSE PRACTITIONER

## 2023-10-23 RX ORDER — FERROUS SULFATE 325(65) MG
325 TABLET ORAL 2 TIMES DAILY
Qty: 180 TABLET | Refills: 1 | Status: SHIPPED | OUTPATIENT
Start: 2023-10-23

## 2023-10-23 RX ORDER — ERGOCALCIFEROL 1.25 MG/1
50000 CAPSULE ORAL WEEKLY
Qty: 12 CAPSULE | Refills: 1 | Status: SHIPPED | OUTPATIENT
Start: 2023-10-23

## 2023-10-23 RX ORDER — AMLODIPINE BESYLATE 10 MG/1
10 TABLET ORAL DAILY
Qty: 90 TABLET | Refills: 2 | Status: SHIPPED | OUTPATIENT
Start: 2023-10-23

## 2023-10-23 NOTE — PROGRESS NOTES
History and Physical     David Atkinson   YOB: 1965    Date of Service: 10/23/2023     Chief Complaint: David Atkinson is a 63 y.o. female who presents for a complete physical examination. HPI: Presents to clinic today for annual physical and follow up on chronic health conditions. Rheumatoid arthritis  Following with Rheumatology. Currently on Enbrel. HTN  Per patient well controlled. Checks readings randomly - recently has been a little high 140s-150s/80s. Compliant with medications. Denies any side effects. Denies any hypotensive episodes. Denies any chest pain, heart palpitations, SOB w/exertion, leg swelling or headaches. Does try to limit salt. HLD  Recent labs a little high. Has recently lost about 20lbs - not intentional, but she has switched her work schedule - works night shift. Moved here full time in March from [place]. She did also get knee surgery which has helped some with being more active. Now at work she is more active than she was previously - lifting more. She did move into a place with steps which makes her more active. ECTOR  Did not start on supplement, but would like to. Vit D Def  Did not start on supplement, but would like to. Diet: Poor - doesn't eat fruits and vegetables daily. Doesn't limit junk and sugar. Exercise: Stays active at work. 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 Works night shift - getting used to it. Hobbies: Spending time with friends. Family life: Single, 3 children, 12 grandchildren - gets to spend time with them - lives with 6 of them. Lives in house - lives with daughter - good living situation. Medium stress - relationship stress - recent break up. No concerns for anxiety or depression. Self breast exam: No  Last eye exam: 2023 - wears glasses.    Last dental exam: Dentures - 2023    Preventative Care:  Health Maintenance   Topic Date Due    Hepatitis B vaccine (1 of 3 - 3-dose series)

## 2023-12-01 ENCOUNTER — OFFICE VISIT (OUTPATIENT)
Dept: FAMILY MEDICINE CLINIC | Age: 58
End: 2023-12-01
Payer: COMMERCIAL

## 2023-12-01 VITALS
OXYGEN SATURATION: 96 % | SYSTOLIC BLOOD PRESSURE: 120 MMHG | DIASTOLIC BLOOD PRESSURE: 76 MMHG | RESPIRATION RATE: 16 BRPM | TEMPERATURE: 98.6 F | BODY MASS INDEX: 31.28 KG/M2 | HEART RATE: 82 BPM | WEIGHT: 171 LBS

## 2023-12-01 DIAGNOSIS — N30.01 ACUTE CYSTITIS WITH HEMATURIA: Primary | ICD-10-CM

## 2023-12-01 LAB
APPEARANCE FLUID: CLEAR
BILIRUBIN, POC: NORMAL
BLOOD URINE, POC: NORMAL
CLARITY, POC: CLEAR
COLOR, POC: NORMAL
GLUCOSE URINE, POC: NORMAL
KETONES, POC: NORMAL
LEUKOCYTE EST, POC: NORMAL
NITRITE, POC: POSITIVE
PH, POC: 5.5
PROTEIN, POC: 100
SPECIFIC GRAVITY, POC: >=1.03
UROBILINOGEN, POC: 0.2

## 2023-12-01 PROCEDURE — 81002 URINALYSIS NONAUTO W/O SCOPE: CPT | Performed by: FAMILY MEDICINE

## 2023-12-01 PROCEDURE — 99213 OFFICE O/P EST LOW 20 MIN: CPT | Performed by: FAMILY MEDICINE

## 2023-12-01 RX ORDER — CIPROFLOXACIN 250 MG/1
250 TABLET, FILM COATED ORAL 2 TIMES DAILY
Qty: 14 TABLET | Refills: 0 | Status: SHIPPED | OUTPATIENT
Start: 2023-12-01 | End: 2023-12-08

## 2023-12-01 ASSESSMENT — ENCOUNTER SYMPTOMS: ABDOMINAL PAIN: 1

## 2023-12-01 NOTE — PROGRESS NOTES
SUBJECTIVE:    Vitaliy Jimenez is a 63 y.o. female who presents for a follow up visit. Chief Complaint   Patient presents with    Urinary Tract Infection     X1 day. Patient is bleeding as well. Painful around abdomen and lower back. Urinary Tract Infection  This is a new problem. The current episode started yesterday. The problem is unchanged. Associated symptoms include hematuria. Patient's medications, allergies, past medical,surgical, social and family histories were reviewed and updated as appropriate. Past Medical History:   Diagnosis Date    Anemia     Fibroids     Rheumatoid arthritis ([address])      Past Surgical History:   Procedure Laterality Date    HYSTERECTOMY (CERVIX STATUS UNKNOWN)  10/2013    TLH, lso    TOOTH EXTRACTION  04/2018     Family History   Problem Relation Age of Onset    Diabetes Mother     Hypertension Mother     Heart Failure Mother     Heart Attack Mother     Diabetes Father     Cancer Sister         colon    Rheum Arthritis Neg Hx     Osteoarthritis Neg Hx     Asthma Neg Hx     Breast Cancer Neg Hx     High Cholesterol Neg Hx     Migraines Neg Hx     Ovarian Cancer Neg Hx     Rashes/Skin Problems Neg Hx     Seizures Neg Hx     Stroke Neg Hx     Thyroid Disease Neg Hx      Social History     Tobacco Use    Smoking status: Never    Smokeless tobacco: Never   Substance Use Topics    Alcohol use: Yes     Comment: RARELY      No Known Allergies  Current Outpatient Medications on File Prior to Visit   Medication Sig Dispense Refill    ferrous sulfate (IRON 325) 325 (65 Fe) MG tablet Take 1 tablet by mouth 2 times daily 180 tablet 1    vitamin D (ERGOCALCIFEROL) 1.25 MG (05973 UT) CAPS capsule Take 1 capsule by mouth once a week 12 capsule 1    amLODIPine (NORVASC) 10 MG tablet Take 1 tablet by mouth daily 90 tablet 2    Etanercept (ENBREL MINI) 50 MG/ML SOCT Inject 50 mg into the skin once a week 4 each 0    meloxicam (MOBIC) 15 MG tablet Take 1 tablet by mouth daily.  27

## 2023-12-03 LAB
BACTERIA UR CULT: ABNORMAL
ORGANISM: ABNORMAL

## 2024-01-16 ENCOUNTER — HOSPITAL ENCOUNTER (OUTPATIENT)
Dept: ULTRASOUND IMAGING | Age: 59
Discharge: HOME | End: 2024-01-16
Payer: COMMERCIAL

## 2024-01-16 ENCOUNTER — HOSPITAL ENCOUNTER (OUTPATIENT)
Dept: WOMENS IMAGING | Age: 59
Discharge: HOME | End: 2024-01-16
Payer: COMMERCIAL

## 2024-01-16 DIAGNOSIS — R92.8 ABNORMAL MAMMOGRAM OF LEFT BREAST: Primary | ICD-10-CM

## 2024-01-16 DIAGNOSIS — R92.8 ABNORMAL MAMMOGRAM OF BOTH BREASTS: ICD-10-CM

## 2024-01-16 PROCEDURE — 76642 ULTRASOUND BREAST LIMITED: CPT

## 2024-01-16 PROCEDURE — G0279 TOMOSYNTHESIS, MAMMO: HCPCS

## 2024-04-09 DIAGNOSIS — E55.9 VITAMIN D DEFICIENCY: ICD-10-CM

## 2024-04-10 RX ORDER — ERGOCALCIFEROL 1.25 MG/1
50000 CAPSULE ORAL WEEKLY
Qty: 12 CAPSULE | Refills: 0 | Status: SHIPPED | OUTPATIENT
Start: 2024-04-10

## 2024-09-20 NOTE — FLOWSHEET NOTE
[facility]  Phone: [phone]   Fax: [phone]    Physical Therapy Daily Treatment Note  Date:  2021    Patient Name:  Robyn Patel    :  1965  MRN: [id number]  Medical/Treatment Diagnosis Information:  · Diagnosis: Primary osteoarthritis of right knee  · Treatment Diagnosis: R knee pain, lateral patellar tracking, weakness in posterior lateral hip  Insurance/Certification information:  PT Insurance Information: [facility] (; telehealth allowed)  Physician Information:  Referring Practitioner: Clarice Perry MD   Plan of care signed (Y/N): [x]  Yes []  No     Date of Patient follow up with Physician:      Progress Report: []  Yes  [x]  No     Date Range for reporting period:  Beginnin20  Ending:     Progress report due (10 Rx/or 30 days whichever is less): visit #6 or  98     Recertification due (POC duration/ or 90 days whichever is less): visit #6 or 20     Visit # Insurance Allowable Auth required? Date Range    Medical necessity []  Yes  [x]  No n/a     Latex Allergy:  [x]NO      []YES  Preferred Language for Healthcare:   [x]English       []other:    Functional Scale:        Date assessed:  LEFS raw score = 65; dysfunction = 19%   20    Pain level:  6-7/10     SUBJECTIVE:  Pt reports her knee has been swollen since Friday and pain is elevated. Pt did start back to work at her part time job this weekend.      OBJECTIVE:  : Pt 7 min late  : Pt 12 min late; noticeable swelling surrounding R knee and TTP along anterior and medial joint line, pain with distraction     RESTRICTIONS/PRECAUTIONS: RA    Exercises/Interventions:     Therapeutic Exercises (88754) Resistance / level Sets/sec Reps Notes   Scifit L 1      IB - gastroc  HR  TR  IB - soleus  30 sec  1  1  30 sec   10  10    HSS  HFS  30 sec B  30 sec B     Quad stretching       Hip abd strength  · Clamshells    2   10 B   V   Prone:  · Hip ext - L  · HS curls  · Quad stretch with SOS      2       10 L only   Cues for maintaining neutral pelvis  1/4: R not performed due to pain   Supine  · Bridge  · Bridge with abd pulse  · SLR flexion    1    1   10    10 B    Seated EOB  · LAQ     10 B   1/4: Small ROM on R                         Therapeutic Activities (39129)                                          Neuromuscular Re-ed (05691)       Gliders - lateral  MAX cues for form    Gliders  - retro     Quadruped:  · Hip extension - R    Unable to maintain neutral pelvis                        Manual Intervention ([id number])                                                 Bold denotes progression or new exercise    Pt. Education:  -patient educated on diagnosis, prognosis and expectations for rehab  -all patient questions were answered    Home Exercise Program:  Access Code: [email or web address]   URL: [email or web address]   Date: 12/09/2020   Prepared by: Tommy Lamar     Exercises   · Prone Quadriceps Stretch with Strap - 3 reps - 1 sets - 30 seconds hold - 1x daily - 7x weekly   · Prone Hip Extension - 10 reps - 2 sets - 1x daily - 7x weekly   · Clamshell - 10 reps - 2 sets - 1x daily - 7x weekly       Therapeutic Exercise and NMR EXR  [x] (65554) Provided verbal/tactile cueing for activities related to strengthening, flexibility, endurance, ROM for improvements in  [x] LE / Lumbar: LE, proximal hip, and core control with self care, mobility, lifting, ambulation. [] UE / Cervical: cervical, postural, scapular, scapulothoracic and UE control with self care, reaching, carrying, lifting, house/yardwork, driving, computer work.  [] (69135) Provided verbal/tactile cueing for activities related to improving balance, coordination, kinesthetic sense, posture, motor skill, proprioception to assist with   [] LE / lumbar: LE, proximal hip, and core control in self care, mobility, lifting, ambulation and eccentric single leg control.    [] UE / cervical: cervical, scapular, scapulothoracic and UE control with self care, reaching, carrying, lifting, house/yardwork, driving, computer work.   [] (67324) Therapist is in constant attendance of 2 or more patients providing skilled therapy interventions, but not providing any significant amount of measurable one-on-one time to either patient, for improvements in  [] LE / lumbar: LE, proximal hip, and core control in self care, mobility, lifting, ambulation and eccentric single leg control. [] UE / cervical: cervical, scapular, scapulothoracic and UE control with self care, reaching, carrying, lifting, house/yardwork, driving, computer work. NMR and Therapeutic Activities:    [] (15591 or 15134) Provided verbal/tactile cueing for activities related to improving balance, coordination, kinesthetic sense, posture, motor skill, proprioception and motor activation to allow for proper function of   [] LE: / Lumbar core, proximal hip and LE with self care and ADLs  [] UE / Cervical: cervical, postural, scapular, scapulothoracic and UE control with self care, carrying, lifting, driving, computer work.   [] (44951) Gait Re-education- Provided training and instruction to the patient for proper LE, core and proximal hip recruitment and positioning and eccentric body weight control with ambulation re-education including up and down stairs     Home Management Training / Self Care:  [] (82587) Provided self-care/home management training related to activities of daily living and compensatory training, and/or use of adaptive equipment for improvement with: ADLs and compensatory training, meal preparation, safety procedures and instruction in use of adaptive equipment, including bathing, grooming, dressing, personal hygiene, basic household cleaning and chores.      Home Exercise Program:    [] (48068) Reviewed/Progressed HEP activities related to strengthening, flexibility, endurance, ROM of   [] LE / Lumbar: core, proximal hip and LE for functional self-care, mobility, lifting and ambulation/stair navigation   [] UE / Cervical: cervical, postural, scapular, scapulothoracic and UE control with self care, reaching, carrying, lifting, house/yardwork, driving, computer work  [] (32895)Reviewed/Progressed HEP activities related to improving balance, coordination, kinesthetic sense, posture, motor skill, proprioception of   [] LE: core, proximal hip and LE for self care, mobility, lifting, and ambulation/stair navigation    [] UE / Cervical: cervical, postural,  scapular, scapulothoracic and UE control with self care, reaching, carrying, lifting, house/yardwork, driving, computer work    Manual Treatments:  PROM / STM / Oscillations-Mobs:  G-I, II, III, IV (PA's, Inf., Post.)  [] (59498) Provided manual therapy to mobilize LE, proximal hip and/or LS spine soft tissue/joints for the purpose of modulating pain, promoting relaxation,  increasing ROM, reducing/eliminating soft tissue swelling/inflammation/restriction, improving soft tissue extensibility and allowing for proper ROM for normal function with   [] LE / lumbar: self care, mobility, lifting and ambulation. [] UE / Cervical: self care, reaching, carrying, lifting, house/yardwork, driving, computer work. Modalities:  [] (44520) Vasopneumatic compression: Utilized vasopneumatic compression to decrease edema / swelling for the purpose of improving mobility and quad tone / recruitment which will allow for increased overall function including but not limited to self-care, transfers, ambulation, and ascending / descending stairs.        Modalities:  1/4: Estim IFC x 15 min with CP to R knee reclined sitting     Charges:  Timed Code Treatment Minutes: 27   Total Treatment Minutes: 42     [] EVAL - LOW (59323)   [] EVAL - MOD (41277)  [] EVAL - HIGH (41568)  [] RE-EVAL (04788)  [x] BF(98619) x 2      [] Ionto  [] NMR (28700) x      [] Vaso  [] Manual (69767) x       [] Ultrasound  [] TA x        [] Mech Traction (82159)  [] Aquatic Therapy x     [x] ES (un) (37330):   [] Home Management Training x  [] ES(attended) (21595)   [] Dry Needling 1-2 muscles (18894):  [] Dry Needling 3+ muscles (423391)  [] Group:      [] Other:     GOALS:   Patient stated goal: Less knee pain   []? Progressing: []? Met: []? Not Met: []? Adjusted     Therapist goals for Patient:   Short Term Goals: To be achieved in: 2 weeks  1. Independent in HEP and progression per patient tolerance, in order to prevent re-injury. []? Progressing: []? Met: []? Not Met: []? Adjusted  2. Patient will have a decrease in pain to facilitate improvement in movement, function, and ADLs as indicated by Functional Deficits. []? Progressing: []? Met: []? Not Met: []? Adjusted     Long Term Goals: To be achieved in: 6 weeks  1. Disability index score of 10% or less for the LEFS to assist with reaching prior level of function. []? Progressing: []? Met: []? Not Met: []? Adjusted  2. Patient will demonstrate an increase in Strength to at least 5/5 as well as good proximal hip strength and control to allow for proper functional mobility as indicated by patients Functional Deficits. []? Progressing: []? Met: []? Not Met: []? Adjusted  3. Patient will return to functional activities including climbing stairs 100% of the time without increased symptoms or restriction. []? Progressing: []? Met: []? Not Met: []? Adjusted  4. Patient will return to walking for an entire shift without taking a break due to pain. []? Progressing: []? Met: []? Not Met: []? Adjusted         Overall Progression Towards Functional goals/ Treatment Progress Update:  [] Patient is progressing as expected towards functional goals listed. [] Progression is slowed due to complexities/Impairments listed. [] Progression has been slowed due to co-morbidities.   [x] Plan just implemented, too soon to assess goals progression <30days   [] Goals require adjustment due to lack of progress  [] Patient is not progressing as expected and requires additional follow up with physician  [] Other    Persisting Functional Limitations/Impairments:  []Sleeping []Sitting               []Standing []Transfers        [x]Walking []Kneeling               []Stairs []Squatting / bending   []ADLs []Reaching  []Lifting  []Housework  []Driving [x]Job related tasks  []Sports/Recreation []Other:        ASSESSMENT: Session limited due to patient's late arrival and elevated pain levels. Performed mat exercises with fair tolerance. Finished with estim IFC to R knee with ice for pain control and to reduce swelling. Pt has one more visit scheduled but may benefit from continued therapy as patient has not yet had a significant improvement since beginning PT. Treatment/Activity Tolerance:  [x] Patient able to complete tx [] Patient limited by fatigue  [] Patient limited by pain  [] Patient limited by other medical complications  [] Other:     Prognosis: [x] Good [] Fair  [] Poor    Patient Requires Follow-up: [x] Yes  [] No    Plan for next treatment session: hip strengthening, quad flexibility     PLAN: See natty. PT 1x / week for 6 weeks. [x] Continue per plan of care [] Alter current plan (see comments)  [] Plan of care initiated [] Hold pending MD visit [] Discharge    Electronically signed by: Tiana Guzman PT, DPT    Note: If patient does not return for scheduled/ recommended follow up visits, this note will serve as a discharge from care along with most recent update on progress. present

## 2024-12-02 ENCOUNTER — OFFICE VISIT (OUTPATIENT)
Dept: FAMILY MEDICINE CLINIC | Age: 59
End: 2024-12-02
Payer: COMMERCIAL

## 2024-12-02 VITALS
RESPIRATION RATE: 22 BRPM | TEMPERATURE: 97.7 F | SYSTOLIC BLOOD PRESSURE: 132 MMHG | WEIGHT: 178.4 LBS | DIASTOLIC BLOOD PRESSURE: 82 MMHG | HEART RATE: 80 BPM | BODY MASS INDEX: 32.63 KG/M2

## 2024-12-02 DIAGNOSIS — J06.9 VIRAL URI: Primary | ICD-10-CM

## 2024-12-02 DIAGNOSIS — R11.0 NAUSEA: ICD-10-CM

## 2024-12-02 PROCEDURE — 99213 OFFICE O/P EST LOW 20 MIN: CPT | Performed by: NURSE PRACTITIONER

## 2024-12-02 RX ORDER — ONDANSETRON 4 MG/1
4 TABLET, FILM COATED ORAL 3 TIMES DAILY PRN
Qty: 15 TABLET | Refills: 0 | Status: SHIPPED | OUTPATIENT
Start: 2024-12-02

## 2024-12-02 ASSESSMENT — ENCOUNTER SYMPTOMS
COUGH: 1
SHORTNESS OF BREATH: 0
VOMITING: 0
DIARRHEA: 0
NAUSEA: 0

## 2024-12-02 NOTE — PROGRESS NOTES
Rachel Diaz  : 1965  Encounter date: 2024    This is a 59 y.o. female who presents with  Chief Complaint   Patient presents with    Dizziness     Yesterday patient stated she felt like she was going to faint.  Today she states she has congestion/headaches and that she feels she has a cold.      History of present illness:    HPI   Presents to clinic today with concerns for sinus congestion/drainage along with headaches that started yesterday.  Denies fever/chills/muscle aches.  Reports some coughing/sneezing - drainage clear.  Has been taking Tylenol with some short term relief. She did have an episode last night at work - feeling like she was going to pass out - she had to take a break - needed to be sent home - she was going to have to stand at work and didn't feel like she was able.  Today has been feeling okay.  Hasn't had much of an appetite today/not drinking much.  Has had recent sick contacts with grandson.  No home covid test.      No Known Allergies  Current Outpatient Medications   Medication Sig Dispense Refill    ondansetron (ZOFRAN) 4 MG tablet Take 1 tablet by mouth 3 times daily as needed for Nausea or Vomiting 15 tablet 0    ferrous sulfate (IRON 325) 325 (65 Fe) MG tablet Take 1 tablet by mouth 2 times daily 180 tablet 1    amLODIPine (NORVASC) 10 MG tablet Take 1 tablet by mouth daily 90 tablet 2    meloxicam (MOBIC) 15 MG tablet Take 1 tablet by mouth daily. 30 tablet 5    vitamin D (ERGOCALCIFEROL) 1.25 MG (01012 UT) CAPS capsule TAKE 1 CAPSULE BY MOUTH 1 TIME A WEEK 12 capsule 0    Etanercept (ENBREL MINI) 50 MG/ML SOCT Inject 50 mg into the skin once a week (Patient not taking: Reported on 2024) 4 each 0     Current Facility-Administered Medications   Medication Dose Route Frequency Provider Last Rate Last Admin    triamcinolone acetonide (KENALOG-40) injection 80 mg  80 mg Intra-artICUlar Once Zena March MD        triamcinolone acetonide (KENALOG-40) injection 80 mg

## 2024-12-11 ENCOUNTER — TELEPHONE (OUTPATIENT)
Dept: FAMILY MEDICINE CLINIC | Age: 59
End: 2024-12-11

## 2024-12-11 ENCOUNTER — PATIENT MESSAGE (OUTPATIENT)
Dept: FAMILY MEDICINE CLINIC | Age: 59
End: 2024-12-11

## 2024-12-11 NOTE — TELEPHONE ENCOUNTER
I spoke to the patient and she is needing the note from 12/2/2024 to stated the reason for the visit, diagnosis and any medication given per her place of employment.

## 2024-12-11 NOTE — TELEPHONE ENCOUNTER
Please see patient message regarding doctor note.  I did leave a message for patient to return call as she requested.

## 2024-12-14 DIAGNOSIS — E55.9 VITAMIN D DEFICIENCY: ICD-10-CM

## 2024-12-16 DIAGNOSIS — E55.9 VITAMIN D DEFICIENCY: ICD-10-CM

## 2024-12-16 RX ORDER — ERGOCALCIFEROL 1.25 MG/1
50000 CAPSULE, LIQUID FILLED ORAL WEEKLY
Qty: 12 CAPSULE | Refills: 0 | Status: SHIPPED | OUTPATIENT
Start: 2024-12-16

## 2024-12-16 RX ORDER — ERGOCALCIFEROL 1.25 MG/1
50000 CAPSULE, LIQUID FILLED ORAL WEEKLY
Qty: 12 CAPSULE | Refills: 0 | OUTPATIENT
Start: 2024-12-16

## 2025-03-10 DIAGNOSIS — E55.9 VITAMIN D DEFICIENCY: ICD-10-CM

## 2025-03-10 RX ORDER — ERGOCALCIFEROL 1.25 MG/1
50000 CAPSULE, LIQUID FILLED ORAL WEEKLY
Qty: 12 CAPSULE | Refills: 0 | Status: SHIPPED | OUTPATIENT
Start: 2025-03-10